# Patient Record
Sex: MALE | Race: WHITE | NOT HISPANIC OR LATINO | Employment: FULL TIME | URBAN - METROPOLITAN AREA
[De-identification: names, ages, dates, MRNs, and addresses within clinical notes are randomized per-mention and may not be internally consistent; named-entity substitution may affect disease eponyms.]

---

## 2017-03-20 ENCOUNTER — OFFICE VISIT (OUTPATIENT)
Dept: NEUROLOGY | Facility: CLINIC | Age: 57
End: 2017-03-20
Payer: COMMERCIAL

## 2017-03-20 VITALS
BODY MASS INDEX: 26.26 KG/M2 | HEART RATE: 69 BPM | DIASTOLIC BLOOD PRESSURE: 68 MMHG | SYSTOLIC BLOOD PRESSURE: 93 MMHG | WEIGHT: 163.38 LBS | HEIGHT: 66 IN

## 2017-03-20 DIAGNOSIS — G35 MS (MULTIPLE SCLEROSIS): Primary | ICD-10-CM

## 2017-03-20 PROCEDURE — 99999 PR PBB SHADOW E&M-EST. PATIENT-LVL III: CPT | Mod: PBBFAC,,, | Performed by: NEUROMUSCULOSKELETAL MEDICINE & OMM

## 2017-03-20 PROCEDURE — 1160F RVW MEDS BY RX/DR IN RCRD: CPT | Mod: S$GLB,,, | Performed by: NEUROMUSCULOSKELETAL MEDICINE & OMM

## 2017-03-20 PROCEDURE — 99215 OFFICE O/P EST HI 40 MIN: CPT | Mod: S$GLB,,, | Performed by: NEUROMUSCULOSKELETAL MEDICINE & OMM

## 2017-03-21 NOTE — PROGRESS NOTES
This history is dictated on [employer] word recognition program. There are word recognition mistakes that are occasionally missed on review.  Present History: Patient presents for follow-up for his multiple sclerosis after almost 2 years.  He is concerned over the fact that he has had increasing problems with balance, double vision and generally feeling more fatigued suggesting exacerbation over the last month or 2.  He has been off immunomodulatory therapy for 10 years.  He took Avonex for 7 years in the past.  His last MRI scan was in May 2012 which showed multiple brain and cervical lesions.  He has concerned over progression now and wishes to start therapy if it is not too late.  He is presently on Social Security disability and not able to work particularly due to the balance problems.   Previous note: 6-29-15: This a 54-year-old white male presents for follow-up for his multiple sclerosis. Patient has not been seen for several years. His last MRI was in May 2012 showing multiple white matter lesions. Patient overall is stable clinically with persistent difficulty walking. He occasionally has some cognitive problems that he notices. His predominant complaint now is intolerance to the summer heat. He has increased problems with walking and simple tasks such as mowing the yard he has to hold onto the lawnmower for support  See old notes from previous practice below:       Neurological Exam:  Mental status-alert and oriented to person, place, and time; attention span and concentration is good. Fund of knowledge-patient is aware of current events and able to give detailed history of the current problem.recent and remote memory seems intact. Language function is normal with no evidence of aphasia     Cranial nerves:Visual acuity to hand chart -normal; visual fields to confrontation normal;pupils were equal and reactive to light ; funduscopic examination was normal with sharp disc margins. 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  On right lateral gaze he has a few beats of nystagmus in the right eye with the left eye not coming over medially.  On left lateral gaze the right eye does not come pass midline but there is no nystagmus in the left eye.. Facial sensation to pinprick and corneal reflexes intact; Facial muscles were symmetrical. Hearing is unimpaired symmetrical finger rub; Tongue and palate movements were normal with swallowing unimpaired. Shoulder shrug was intact with good strength Speech was normal     Motor examination: Upper -normal; Lower extremities - right leg proximal weakness 3/5;;muscle tone was normal ; right-handed  Sensory examination:Upper and lower extremities - Pinprick and soft touch were normal and symmetrical.   Vibration sense less than 10 seconds at toes  Deep tendon reflexes hyperreflexia with the right ankle clonus;. Both plantar responses were flexor  Cerebellar examination upper: Normal finger to nose and rapid alternating movements  Gait: Unsteady gait  Romberg test: Positive     Tandem gait: Poor  Involuntary movements: Mild postural tremor; endpoint cerebellar tremor in the right hand  Cervical examination: Full range of motion with no pain Cervical tenderness:negative  Lumbar examination: Low back tenderness-negative Sciatic notch tenderness-negative Straight leg raising test-negative     Impression: Multiple sclerosis with suggestion of recent exacerbation  Recommendations/plan:  Long discussion with the patient in terms of need for medication.  He has definite progression of symptoms as well as clinical exam shows some worsening.  Need MRI is to establish radiologic progression.  We'll order MRI brain and cervical with and without contrast; we'll also order blood work CBC, liver function, QuantiFERON in anticipation of starting new immunomodulatory therapy.

## 2017-03-22 ENCOUNTER — TELEPHONE (OUTPATIENT)
Dept: NEUROLOGY | Facility: CLINIC | Age: 57
End: 2017-03-22

## 2017-03-22 NOTE — TELEPHONE ENCOUNTER
----- Message from Audelia Iqbal sent at 3/22/2017  8:51 AM CDT -----  Contact: Pt  Magdalene,    Pt states you were to contact him on yesterday (he wouldn't tell me what it is about) he didn't hear from you so he is checking in to make sure you dont forget about him    Pt contact number [phone]  Thanks

## 2017-03-22 NOTE — TELEPHONE ENCOUNTER
I have called and spoken to this patient and told him the date and time for his appointments on next Friday.

## 2017-03-31 ENCOUNTER — HOSPITAL ENCOUNTER (OUTPATIENT)
Dept: RADIOLOGY | Facility: HOSPITAL | Age: 57
Discharge: HOME | End: 2017-03-31
Attending: NEUROMUSCULOSKELETAL MEDICINE & OMM
Payer: COMMERCIAL

## 2017-03-31 DIAGNOSIS — G35 MS (MULTIPLE SCLEROSIS): ICD-10-CM

## 2017-03-31 PROCEDURE — 70553 MRI BRAIN STEM W/O & W/DYE: CPT | Mod: 26,,, | Performed by: RADIOLOGY

## 2017-03-31 PROCEDURE — 72156 MRI NECK SPINE W/O & W/DYE: CPT | Mod: 26,,, | Performed by: RADIOLOGY

## 2017-03-31 PROCEDURE — 25500020 PHARM REV CODE 255: Performed by: NEUROMUSCULOSKELETAL MEDICINE & OMM

## 2017-03-31 PROCEDURE — 72156 MRI NECK SPINE W/O & W/DYE: CPT | Mod: TC

## 2017-03-31 PROCEDURE — A9585 GADOBUTROL INJECTION: HCPCS | Performed by: NEUROMUSCULOSKELETAL MEDICINE & OMM

## 2017-03-31 PROCEDURE — 70553 MRI BRAIN STEM W/O & W/DYE: CPT | Mod: TC

## 2017-03-31 RX ORDER — GADOBUTROL 604.72 MG/ML
8 INJECTION INTRAVENOUS
Status: COMPLETED | OUTPATIENT
Start: 2017-03-31 | End: 2017-03-31

## 2017-03-31 RX ADMIN — GADOBUTROL 8 ML: 604.72 INJECTION INTRAVENOUS at 10:03

## 2017-04-24 ENCOUNTER — LAB VISIT (OUTPATIENT)
Dept: LAB | Facility: HOSPITAL | Age: 57
End: 2017-04-24
Attending: NEUROMUSCULOSKELETAL MEDICINE & OMM
Payer: COMMERCIAL

## 2017-04-24 DIAGNOSIS — G35 MS (MULTIPLE SCLEROSIS): ICD-10-CM

## 2017-04-24 PROCEDURE — 86480 TB TEST CELL IMMUN MEASURE: CPT

## 2017-04-24 PROCEDURE — 36415 COLL VENOUS BLD VENIPUNCTURE: CPT | Mod: PO

## 2017-04-26 LAB
MITOGEN NIL: 9.52 IU/ML
NIL: 0.03 IU/ML
TB ANTIGEN NIL: 0.03 IU/ML
TB ANTIGEN: 0.06 IU/ML
TB GOLD: NEGATIVE

## 2018-02-23 ENCOUNTER — TELEPHONE (OUTPATIENT)
Dept: TRANSPLANT | Facility: CLINIC | Age: 58
End: 2018-02-23

## 2018-02-23 NOTE — TELEPHONE ENCOUNTER
Hernandez Charlie Hernandez called and stated that he is interested in becoming a living donor for his friend, Burke Diez.  Medical and social history obtained.  Patient reports having MS, was treated with Interferon from [id number]. Informed that he does not meet criteria for donation. Patient verbalized understanding.

## 2018-04-11 RX ORDER — AMITRIPTYLINE HYDROCHLORIDE 25 MG/1
TABLET, FILM COATED ORAL
Qty: 30 TABLET | Refills: 5 | Status: SHIPPED | OUTPATIENT
Start: 2018-04-11 | End: 2018-10-09 | Stop reason: SDUPTHER

## 2018-04-11 NOTE — TELEPHONE ENCOUNTER
----- Message from Marci Chi sent at 4/11/2018  9:59 AM CDT -----  Contact: pt @ [phone]  Calling for refill on medication: amitriptyline (ELAVIL) 25 MG tablet    [id number] - [place] - [address]  [address] [place] [address]  Phone: [phone] Fax: [phone]

## 2018-09-27 ENCOUNTER — TELEPHONE (OUTPATIENT)
Dept: NEUROLOGY | Facility: CLINIC | Age: 58
End: 2018-09-27

## 2018-09-27 NOTE — TELEPHONE ENCOUNTER
----- Message from Marci Chi sent at 9/27/2018 12:34 PM CDT -----  Rx Refill/Request     Is this a Refill or New Rx:refill    Rx Name and Strength:  amitriptyline (ELAVIL) 25 MG tablet  Preferred Pharmacy with phone number:     [facility] [place] - [address]  [address] [place] [address]  Phone: [phone] Fax: [phone]    Communication Preference:pt @ [phone]  Additional Information: please note the change in pharmacy.

## 2018-09-27 NOTE — TELEPHONE ENCOUNTER
Patient became hostile stating he has never had to go through this issue in the past as the nurse would just refill his RX's.I explained to the patient legally he needed to f/u at 3,6 or 12 months as requested by the provider to receive continuous prescription medications.  I attempted to explain to the patient that he can receive a 30 day supply and would have to schedule an appointment for any future refills and he became combative, I attempted to advise him that he did not have to schedule an appt, I would forward message to covering provider and before I  Could complete my statement he hung up.

## 2018-10-09 RX ORDER — AMITRIPTYLINE HYDROCHLORIDE 25 MG/1
TABLET, FILM COATED ORAL
Qty: 30 TABLET | Refills: 5 | Status: SHIPPED | OUTPATIENT
Start: 2018-10-09 | End: 2019-04-09 | Stop reason: SDUPTHER

## 2018-10-17 ENCOUNTER — OFFICE VISIT (OUTPATIENT)
Dept: NEUROLOGY | Facility: CLINIC | Age: 58
End: 2018-10-17
Payer: MEDICARE

## 2018-10-17 VITALS
WEIGHT: 180.13 LBS | DIASTOLIC BLOOD PRESSURE: 75 MMHG | SYSTOLIC BLOOD PRESSURE: 120 MMHG | BODY MASS INDEX: 28.95 KG/M2 | HEART RATE: 73 BPM | HEIGHT: 66 IN

## 2018-10-17 DIAGNOSIS — R26.9 GAIT DISORDER: ICD-10-CM

## 2018-10-17 DIAGNOSIS — G35 MULTIPLE SCLEROSIS: Primary | ICD-10-CM

## 2018-10-17 PROCEDURE — 99999 PR PBB SHADOW E&M-EST. PATIENT-LVL III: CPT | Mod: PBBFAC,,, | Performed by: NEUROMUSCULOSKELETAL MEDICINE & OMM

## 2018-10-17 PROCEDURE — 99213 OFFICE O/P EST LOW 20 MIN: CPT | Mod: PBBFAC,PO | Performed by: NEUROMUSCULOSKELETAL MEDICINE & OMM

## 2018-10-17 PROCEDURE — 99215 OFFICE O/P EST HI 40 MIN: CPT | Mod: S$PBB,,, | Performed by: NEUROMUSCULOSKELETAL MEDICINE & OMM

## 2018-10-17 RX ORDER — OMEGA-3 FATTY ACIDS/FISH OIL 360-1200MG
CAPSULE ORAL
COMMUNITY
End: 2022-06-07

## 2018-10-17 RX ORDER — ERGOCALCIFEROL 1.25 MG/1
CAPSULE ORAL
Refills: 3 | COMMUNITY
Start: 2018-08-09 | End: 2021-12-07

## 2018-10-19 NOTE — PROGRESS NOTES
Progress Notes        This history is dictated on [employer] word recognition program. There are word recognition mistakes that are occasionally missed on review.  Present History:  patient presents today for follow-up for his multiple sclerosis.  He continues to limp somewhat on the right leg however overall feels good.  Jitendra baires in stays quite active.  His last visit on 3-20-17 showed MRI brain and cervical spine were stable.  He still not interested in any immunomodulatory medication.  He does have trouble sleeping at night sometimes and has been on amitriptyline 25 mg HS.  We discussed am increasing the dosage as needed.  Previous note:  3-20-17:  Patient presents for follow-up for his multiple sclerosis after almost 2 years.  He is concerned over the fact that he has had increasing problems with balance, double vision   and generally feeling more fatigued suggesting exacerbation over the last month or 2.  He has been off immunomodulatory therapy for 10 years.  He took Avonex for 7 years in the past.  His last MRI scan was in May 2012 which showed multiple brain and cervical lesions.  He has concerned over progression now and wishes to start therapy if it is not too late.  He is presently on Social Security disability and not able to work particularly due to the balance problems.   Previous note: 6-29-15: This a 54-year-old white male presents for follow-up for his multiple sclerosis. Patient has not been seen for several years. His last MRI was in May 2012 showing multiple white matter lesions. Patient overall is stable clinically with persistent difficulty walking. He occasionally has some cognitive problems that he notices. His predominant complaint now is intolerance to the summer heat. He has increased problems with walking and simple tasks such as mowing the yard he has to hold onto the lawnmower for support  See old notes from previous practice below:       Neurological Exam:  Mental  status-alert and oriented to person, place, and time; attention span and concentration is good. Fund of knowledge-patient is aware of current events and able to give detailed history of the current problem.recent and remote memory seems intact. Language function is normal with no evidence of aphasia     Cranial nerves:Visual acuity to hand chart -normal; visual fields to confrontation normal;pupils were equal and reactive to light ; funduscopic examination was normal with sharp disc margins. 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  On right lateral gaze he has a few beats of nystagmus in the right eye with the left eye not coming over medially.  On left lateral gaze the right eye does not come pass midline but there is no nystagmus in the left eye.. Facial sensation to pinprick and corneal reflexes intact; Facial muscles were symmetrical. Hearing is unimpaired symmetrical finger rub; Tongue and palate movements were normal with swallowing unimpaired. Shoulder shrug was intact with good strength Speech was normal     Motor examination: Upper -normal; Lower extremities - right leg proximal weakness 3/5;;muscle tone was normal ; right-handed  Sensory examination:Upper and lower extremities - Pinprick and soft touch were normal and symmetrical.   Vibration sense less than 10 seconds at toes  Deep tendon reflexes hyperreflexia with the right ankle clonus;. Both plantar responses were flexor  Cerebellar examination upper: Normal finger to nose and rapid alternating movements  Gait: Unsteady gait; limps on right leg  Romberg test: Positive     Tandem gait: Poor  Involuntary movements: Mild postural tremor; endpoint cerebellar tremor in the right hand  Cervical examination: Full range of motion with no pain Cervical tenderness:negative  Lumbar examination: Low back tenderness-negative Sciatic notch tenderness-negative Straight leg raising test-negative     Impression: Multiple  sclerosis with suggestion of recent exacerbation  Recommendations/plan:  Again Long discussion with the patient in terms of immunologic medication.   since he is stable now he wishes to hold off again..   continue amitriptyline 25 mg at night for sleep with an option to take 50 on occasion.; follow-up 1 year

## 2019-04-09 RX ORDER — AMITRIPTYLINE HYDROCHLORIDE 25 MG/1
TABLET, FILM COATED ORAL
Qty: 30 TABLET | Refills: 5 | OUTPATIENT
Start: 2019-04-09

## 2019-04-09 RX ORDER — AMITRIPTYLINE HYDROCHLORIDE 25 MG/1
TABLET, FILM COATED ORAL
Qty: 30 TABLET | Refills: 5 | Status: SHIPPED | OUTPATIENT
Start: 2019-04-09 | End: 2019-10-17 | Stop reason: SDUPTHER

## 2019-06-25 ENCOUNTER — TELEPHONE (OUTPATIENT)
Dept: NEUROLOGY | Facility: CLINIC | Age: 59
End: 2019-06-25

## 2019-06-25 NOTE — TELEPHONE ENCOUNTER
----- Message from Stanley Patterson sent at 6/25/2019  2:29 PM CDT -----  Contact: Patient @ [phone]  Patient calling to schedule the Recall visit, pls call

## 2019-10-16 RX ORDER — AMITRIPTYLINE HYDROCHLORIDE 25 MG/1
TABLET, FILM COATED ORAL
Qty: 30 TABLET | Refills: 0 | OUTPATIENT
Start: 2019-10-16

## 2019-10-17 RX ORDER — AMITRIPTYLINE HYDROCHLORIDE 25 MG/1
TABLET, FILM COATED ORAL
Qty: 30 TABLET | Refills: 0 | Status: SHIPPED | OUTPATIENT
Start: 2019-10-17 | End: 2019-11-18 | Stop reason: SDUPTHER

## 2019-10-17 NOTE — TELEPHONE ENCOUNTER
----- Message from Janeth Chi sent at 10/17/2019  9:18 AM CDT -----  Contact: Self [phone]  .Refill request.  amitriptyline (ELAVIL) 25 MG tablet    ..  [facility] - [place] - [address]  [address] [place] [address]  Phone: [phone] Fax: [phone]

## 2019-11-05 ENCOUNTER — OFFICE VISIT (OUTPATIENT)
Dept: NEUROLOGY | Facility: CLINIC | Age: 59
End: 2019-11-05
Payer: MEDICARE

## 2019-11-05 VITALS
BODY MASS INDEX: 29.72 KG/M2 | DIASTOLIC BLOOD PRESSURE: 98 MMHG | HEIGHT: 66 IN | WEIGHT: 184.94 LBS | HEART RATE: 95 BPM | SYSTOLIC BLOOD PRESSURE: 144 MMHG

## 2019-11-05 DIAGNOSIS — R29.898 RIGHT LEG WEAKNESS: ICD-10-CM

## 2019-11-05 DIAGNOSIS — G35 MULTIPLE SCLEROSIS: Primary | ICD-10-CM

## 2019-11-05 DIAGNOSIS — G25.2 POSTURAL TREMOR: ICD-10-CM

## 2019-11-05 PROCEDURE — 99213 OFFICE O/P EST LOW 20 MIN: CPT | Mod: PBBFAC,PO | Performed by: NEUROMUSCULOSKELETAL MEDICINE & OMM

## 2019-11-05 PROCEDURE — 99999 PR PBB SHADOW E&M-EST. PATIENT-LVL III: ICD-10-PCS | Mod: PBBFAC,,, | Performed by: NEUROMUSCULOSKELETAL MEDICINE & OMM

## 2019-11-05 PROCEDURE — 99214 PR OFFICE/OUTPT VISIT, EST, LEVL IV, 30-39 MIN: ICD-10-PCS | Mod: S$GLB,,, | Performed by: NEUROMUSCULOSKELETAL MEDICINE & OMM

## 2019-11-05 PROCEDURE — 99999 PR PBB SHADOW E&M-EST. PATIENT-LVL III: CPT | Mod: PBBFAC,,, | Performed by: NEUROMUSCULOSKELETAL MEDICINE & OMM

## 2019-11-05 PROCEDURE — 99214 OFFICE O/P EST MOD 30 MIN: CPT | Mod: S$GLB,,, | Performed by: NEUROMUSCULOSKELETAL MEDICINE & OMM

## 2019-11-05 RX ORDER — METHYLPREDNISOLONE 4 MG/1
TABLET ORAL
COMMUNITY
Start: 2019-04-27 | End: 2021-12-07 | Stop reason: ALTCHOICE

## 2019-11-06 PROBLEM — R29.898 RIGHT LEG WEAKNESS: Status: ACTIVE | Noted: 2019-11-06

## 2019-11-08 ENCOUNTER — HOSPITAL ENCOUNTER (OUTPATIENT)
Dept: RADIOLOGY | Facility: HOSPITAL | Age: 59
Discharge: HOME | End: 2019-11-08
Attending: NEUROMUSCULOSKELETAL MEDICINE & OMM
Payer: MEDICARE

## 2019-11-08 DIAGNOSIS — G35 MULTIPLE SCLEROSIS: ICD-10-CM

## 2019-11-08 PROCEDURE — 70553 MRI BRAIN W WO CONTRAST: ICD-10-PCS | Mod: 26,,, | Performed by: RADIOLOGY

## 2019-11-08 PROCEDURE — A9585 GADOBUTROL INJECTION: HCPCS | Performed by: NEUROMUSCULOSKELETAL MEDICINE & OMM

## 2019-11-08 PROCEDURE — 70553 MRI BRAIN STEM W/O & W/DYE: CPT | Mod: 26,,, | Performed by: RADIOLOGY

## 2019-11-08 PROCEDURE — 72156 MRI NECK SPINE W/O & W/DYE: CPT | Mod: 26,,, | Performed by: RADIOLOGY

## 2019-11-08 PROCEDURE — 25500020 PHARM REV CODE 255: Performed by: NEUROMUSCULOSKELETAL MEDICINE & OMM

## 2019-11-08 PROCEDURE — 70553 MRI BRAIN STEM W/O & W/DYE: CPT | Mod: TC

## 2019-11-08 PROCEDURE — 72156 MRI CERVICAL SPINE W WO CONTRAST: ICD-10-PCS | Mod: 26,,, | Performed by: RADIOLOGY

## 2019-11-08 PROCEDURE — 72156 MRI NECK SPINE W/O & W/DYE: CPT | Mod: TC

## 2019-11-08 RX ORDER — GADOBUTROL 604.72 MG/ML
9 INJECTION INTRAVENOUS
Status: COMPLETED | OUTPATIENT
Start: 2019-11-08 | End: 2019-11-08

## 2019-11-08 RX ADMIN — GADOBUTROL 9 ML: 604.72 INJECTION INTRAVENOUS at 05:11

## 2019-11-12 ENCOUNTER — PATIENT MESSAGE (OUTPATIENT)
Dept: NEUROLOGY | Facility: CLINIC | Age: 59
End: 2019-11-12

## 2019-11-18 RX ORDER — AMITRIPTYLINE HYDROCHLORIDE 25 MG/1
TABLET, FILM COATED ORAL
Qty: 30 TABLET | Refills: 0 | OUTPATIENT
Start: 2019-11-18

## 2019-11-18 RX ORDER — AMITRIPTYLINE HYDROCHLORIDE 25 MG/1
TABLET, FILM COATED ORAL
Qty: 30 TABLET | Refills: 0 | Status: SHIPPED | OUTPATIENT
Start: 2019-11-18 | End: 2019-12-26 | Stop reason: SDUPTHER

## 2019-12-17 ENCOUNTER — TELEPHONE (OUTPATIENT)
Dept: NEUROLOGY | Facility: CLINIC | Age: 59
End: 2019-12-17

## 2019-12-17 NOTE — TELEPHONE ENCOUNTER
----- Message from Andria Levine sent at 12/17/2019  3:55 PM CST -----  Contact: pt at [phone]  Ahsan pt-Pt  Received a letter that heis due to come in.  Had an MRI last month and no one ever called him with the results so pt looked it up himself.  Does he really need to come in again?

## 2019-12-24 RX ORDER — AMITRIPTYLINE HYDROCHLORIDE 25 MG/1
TABLET, FILM COATED ORAL
Qty: 30 TABLET | Refills: 0 | OUTPATIENT
Start: 2019-12-24

## 2019-12-26 RX ORDER — AMITRIPTYLINE HYDROCHLORIDE 25 MG/1
TABLET, FILM COATED ORAL
Qty: 30 TABLET | Refills: 1 | Status: SHIPPED | OUTPATIENT
Start: 2019-12-26 | End: 2020-03-30 | Stop reason: SDUPTHER

## 2020-03-30 RX ORDER — AMITRIPTYLINE HYDROCHLORIDE 25 MG/1
TABLET, FILM COATED ORAL
Qty: 30 TABLET | Refills: 0 | Status: SHIPPED | OUTPATIENT
Start: 2020-03-30 | End: 2020-04-28

## 2020-04-28 RX ORDER — AMITRIPTYLINE HYDROCHLORIDE 25 MG/1
TABLET, FILM COATED ORAL
Qty: 30 TABLET | Refills: 0 | Status: SHIPPED | OUTPATIENT
Start: 2020-04-28 | End: 2020-06-02

## 2020-08-26 DIAGNOSIS — Z86.69 HX OF MIGRAINE HEADACHES: Primary | ICD-10-CM

## 2020-08-26 RX ORDER — AMITRIPTYLINE HYDROCHLORIDE 25 MG/1
25 TABLET, FILM COATED ORAL NIGHTLY
Qty: 30 TABLET | Refills: 0 | Status: SHIPPED | OUTPATIENT
Start: 2020-08-26 | End: 2020-09-22

## 2020-09-02 ENCOUNTER — OFFICE VISIT (OUTPATIENT)
Dept: NEUROLOGY | Facility: CLINIC | Age: 60
End: 2020-09-02
Payer: MEDICARE

## 2020-09-02 VITALS
DIASTOLIC BLOOD PRESSURE: 95 MMHG | SYSTOLIC BLOOD PRESSURE: 133 MMHG | BODY MASS INDEX: 29.57 KG/M2 | HEIGHT: 66 IN | WEIGHT: 184 LBS | HEART RATE: 94 BPM

## 2020-09-02 DIAGNOSIS — R26.9 GAIT DISORDER: Primary | ICD-10-CM

## 2020-09-02 DIAGNOSIS — G35 MULTIPLE SCLEROSIS: ICD-10-CM

## 2020-09-02 PROCEDURE — 3008F PR BODY MASS INDEX (BMI) DOCUMENTED: ICD-10-PCS | Mod: CPTII,S$GLB,, | Performed by: NEUROMUSCULOSKELETAL MEDICINE & OMM

## 2020-09-02 PROCEDURE — 99214 PR OFFICE/OUTPT VISIT, EST, LEVL IV, 30-39 MIN: ICD-10-PCS | Mod: S$GLB,,, | Performed by: NEUROMUSCULOSKELETAL MEDICINE & OMM

## 2020-09-02 PROCEDURE — 99999 PR PBB SHADOW E&M-EST. PATIENT-LVL III: ICD-10-PCS | Mod: PBBFAC,,, | Performed by: NEUROMUSCULOSKELETAL MEDICINE & OMM

## 2020-09-02 PROCEDURE — 3008F BODY MASS INDEX DOCD: CPT | Mod: CPTII,S$GLB,, | Performed by: NEUROMUSCULOSKELETAL MEDICINE & OMM

## 2020-09-02 PROCEDURE — 99999 PR PBB SHADOW E&M-EST. PATIENT-LVL III: CPT | Mod: PBBFAC,,, | Performed by: NEUROMUSCULOSKELETAL MEDICINE & OMM

## 2020-09-02 PROCEDURE — 99214 OFFICE O/P EST MOD 30 MIN: CPT | Mod: S$GLB,,, | Performed by: NEUROMUSCULOSKELETAL MEDICINE & OMM

## 2020-09-02 NOTE — PROGRESS NOTES
Progress Notes  Ritesh Foreman MD (Physician)   Neurology   11/5/2019  3:40 PM   Signed        This history is dictated on [employer] word recognition program. There are word recognition mistakes that are occasionally missed on review.  Present History:   PATIENT PRESENTS FOR FOLLOW-UP FOR HIS MULTIPLE SCLEROSIS.  EVERYTHING IS STABLE WITH NO CLINICAL CHANGES ON NO IMMUNOMODULATOR MEDICATIONS.  PATIENT HAS OCCASIONAL FALLS WITH HIS BALANCE BEING OFF BUT OTHERWISE NO FOCAL NEUROLOGIC CHANGES.  HIS LAST MRI IN NOVEMBER 2019 WAS STABLE WITH NO CHANGE.      PREVIOUS NOTE 11-5-19:  Patient presents for follow-up for his multiple sclerosis.  Is presently on amitriptyline 25 mg at night but no immunomodulatory treatment.  He complains of persistent right leg weakness with aggravation by fatigue. He continues to have some weakness on walking particularly with dorsiflexion and plantar flexion of the right foot.  He continues to have tremor of the right hand.  His last MRI was in 3-3 1-17 with a multiple brain and cervical lesions.  Patient inquires as to whether he should go back on immunomodulator therapy.  We will make this decision based on MRI scan      Previous note:  10-17-18:   patient presents today for follow-up for his multiple sclerosis.  He continues to limp somewhat on the right leg however overall feels good.  Jitendra baires in stays quite active.  His last visit on 3-20-17 showed MRI brain and cervical spine were stable.  He still not interested in any immunomodulatory medication.  He does have trouble sleeping at night sometimes and has been on amitriptyline 25 mg HS.  We discussed am increasing the dosage as needed.       See old notes from previous practice below:       Neurological Exam:  Mental status-alert and oriented to person, place, and time; attention span and concentration is good. Fund of knowledge-patient is aware of current events and able to give detailed history of the  current problem.recent and remote memory seems intact. Language function is normal with no evidence of aphasia     Cranial nerves:Visual acuity to hand chart -normal; visual fields to confrontation normal;pupils were equal and reactive to light ; funduscopic examination was normal with sharp disc margins. external ocular movements show evidence for bilateral internuclear ophthalmoplegia.  On right lateral gaze he has a few beats of nystagmus in the right eye with the left eye not coming over medially.  On left lateral gaze the right eye does not come pass midline but there is no nystagmus in the left eye.. Facial sensation to pinprick and corneal reflexes intact; Facial muscles were symmetrical. Hearing is unimpaired symmetrical finger rub; Tongue and palate movements were normal with swallowing unimpaired. Shoulder shrug was intact with good strength Speech was normal     Motor examination: Upper -normal; Lower extremities - right leg proximal-good strength; dorsiflexors plantar flexor weakness if under 3/5;;muscle tone was normal ; right-handed  Sensory examination:Upper and lower extremities - Pinprick and soft touch were normal and symmetrical.   Vibration sense less than 10 seconds at toes  Deep tendon reflexes hyperreflexia with the right ankle clonus;. Both plantar responses were flexor  Cerebellar examination upper: Normal finger to nose and rapid alternating movements  Gait: Unsteady gait; limps on right leg  Romberg test: Positive     Tandem gait: Poor  Involuntary movements: Mild postural tremor; endpoint cerebellar tremor in the right hand  Cervical examination: Full range of motion with no pain Cervical tenderness:negative  Lumbar examination: Low back tenderness-negative Sciatic notch tenderness-negative Straight leg raising test-negative     Impression: Multiple sclerosis ; right leg weakness; suggestion of AB in the past; postural tremor  Recommendations/plan A:  Again Long discussion with the  patient in terms of immunologic medication; discussed options in terms of MRI findings on ordered scans..  FOLLOW-UP IN 6 MONTHS

## 2021-02-23 ENCOUNTER — TELEPHONE (OUTPATIENT)
Dept: NEUROLOGY | Facility: CLINIC | Age: 61
End: 2021-02-23

## 2021-03-02 ENCOUNTER — TELEPHONE (OUTPATIENT)
Dept: NEUROLOGY | Facility: CLINIC | Age: 61
End: 2021-03-02

## 2021-03-02 DIAGNOSIS — Z86.69 HX OF MIGRAINE HEADACHES: ICD-10-CM

## 2021-03-03 RX ORDER — AMITRIPTYLINE HYDROCHLORIDE 25 MG/1
TABLET, FILM COATED ORAL
Qty: 30 TABLET | Refills: 0 | Status: SHIPPED | OUTPATIENT
Start: 2021-03-03 | End: 2021-03-25

## 2021-04-17 DIAGNOSIS — Z86.69 HX OF MIGRAINE HEADACHES: ICD-10-CM

## 2021-04-22 RX ORDER — AMITRIPTYLINE HYDROCHLORIDE 25 MG/1
TABLET, FILM COATED ORAL
Qty: 30 TABLET | Refills: 0 | Status: SHIPPED | OUTPATIENT
Start: 2021-04-22 | End: 2021-05-19

## 2021-06-11 ENCOUNTER — NURSE TRIAGE (OUTPATIENT)
Dept: ADMINISTRATIVE | Facility: CLINIC | Age: 61
End: 2021-06-11

## 2021-06-11 ENCOUNTER — PATIENT MESSAGE (OUTPATIENT)
Dept: NEUROLOGY | Facility: CLINIC | Age: 61
End: 2021-06-11

## 2021-09-27 ENCOUNTER — TELEPHONE (OUTPATIENT)
Dept: NEUROLOGY | Facility: CLINIC | Age: 61
End: 2021-09-27

## 2021-09-30 ENCOUNTER — TELEPHONE (OUTPATIENT)
Dept: NEUROLOGY | Facility: CLINIC | Age: 61
End: 2021-09-30

## 2021-12-07 ENCOUNTER — LAB VISIT (OUTPATIENT)
Dept: LAB | Facility: HOSPITAL | Age: 61
End: 2021-12-07
Attending: FAMILY MEDICINE
Payer: MEDICARE

## 2021-12-07 DIAGNOSIS — D64.9 ANEMIA, UNSPECIFIED TYPE: ICD-10-CM

## 2021-12-07 DIAGNOSIS — R53.83 FATIGUE, UNSPECIFIED TYPE: ICD-10-CM

## 2021-12-07 DIAGNOSIS — Z13.6 ENCOUNTER FOR SCREENING FOR CARDIOVASCULAR DISORDERS: ICD-10-CM

## 2021-12-07 DIAGNOSIS — E55.9 VITAMIN D DEFICIENCY: ICD-10-CM

## 2021-12-07 LAB
25(OH)D3+25(OH)D2 SERPL-MCNC: 52 NG/ML (ref 30–96)
ALBUMIN SERPL BCP-MCNC: 4.3 G/DL (ref 3.5–5.2)
ALP SERPL-CCNC: 69 U/L (ref 55–135)
ALT SERPL W/O P-5'-P-CCNC: 42 U/L (ref 10–44)
ANION GAP SERPL CALC-SCNC: 8 MMOL/L (ref 8–16)
AST SERPL-CCNC: 24 U/L (ref 10–40)
BASOPHILS # BLD AUTO: 0.06 K/UL (ref 0–0.2)
BASOPHILS NFR BLD: 0.8 % (ref 0–1.9)
BILIRUB SERPL-MCNC: 0.6 MG/DL (ref 0.1–1)
BUN SERPL-MCNC: 9 MG/DL (ref 6–20)
CALCIUM SERPL-MCNC: 9.6 MG/DL (ref 8.7–10.5)
CHLORIDE SERPL-SCNC: 104 MMOL/L (ref 95–110)
CHOLEST SERPL-MCNC: 242 MG/DL (ref 120–199)
CHOLEST/HDLC SERPL: 5.4 {RATIO} (ref 2–5)
CO2 SERPL-SCNC: 28 MMOL/L (ref 23–29)
CREAT SERPL-MCNC: 0.9 MG/DL (ref 0.5–1.4)
DIFFERENTIAL METHOD: ABNORMAL
EOSINOPHIL # BLD AUTO: 0.2 K/UL (ref 0–0.5)
EOSINOPHIL NFR BLD: 2.4 % (ref 0–8)
ERYTHROCYTE [DISTWIDTH] IN BLOOD BY AUTOMATED COUNT: 12.1 % (ref 11.5–14.5)
EST. GFR  (AFRICAN AMERICAN): >60 ML/MIN/1.73 M^2
EST. GFR  (NON AFRICAN AMERICAN): >60 ML/MIN/1.73 M^2
FERRITIN SERPL-MCNC: 62 NG/ML (ref 20–300)
FOLATE SERPL-MCNC: 19.5 NG/ML (ref 4–24)
GLUCOSE SERPL-MCNC: 108 MG/DL (ref 70–110)
HCT VFR BLD AUTO: 47 % (ref 40–54)
HDLC SERPL-MCNC: 45 MG/DL (ref 40–75)
HDLC SERPL: 18.6 % (ref 20–50)
HGB BLD-MCNC: 15.7 G/DL (ref 14–18)
IMM GRANULOCYTES # BLD AUTO: 0.05 K/UL (ref 0–0.04)
IMM GRANULOCYTES NFR BLD AUTO: 0.7 % (ref 0–0.5)
IRON SERPL-MCNC: 140 UG/DL (ref 45–160)
LDLC SERPL CALC-MCNC: 162.8 MG/DL (ref 63–159)
LYMPHOCYTES # BLD AUTO: 2.4 K/UL (ref 1–4.8)
LYMPHOCYTES NFR BLD: 31.1 % (ref 18–48)
MCH RBC QN AUTO: 30.3 PG (ref 27–31)
MCHC RBC AUTO-ENTMCNC: 33.4 G/DL (ref 32–36)
MCV RBC AUTO: 91 FL (ref 82–98)
MONOCYTES # BLD AUTO: 0.7 K/UL (ref 0.3–1)
MONOCYTES NFR BLD: 9.2 % (ref 4–15)
NEUTROPHILS # BLD AUTO: 4.2 K/UL (ref 1.8–7.7)
NEUTROPHILS NFR BLD: 55.8 % (ref 38–73)
NONHDLC SERPL-MCNC: 197 MG/DL
NRBC BLD-RTO: 0 /100 WBC
PLATELET # BLD AUTO: 262 K/UL (ref 150–450)
PMV BLD AUTO: 8.8 FL (ref 9.2–12.9)
POTASSIUM SERPL-SCNC: 4.1 MMOL/L (ref 3.5–5.1)
PROT SERPL-MCNC: 7.6 G/DL (ref 6–8.4)
RBC # BLD AUTO: 5.18 M/UL (ref 4.6–6.2)
SATURATED IRON: 33 % (ref 20–50)
SODIUM SERPL-SCNC: 140 MMOL/L (ref 136–145)
TOTAL IRON BINDING CAPACITY: 426 UG/DL (ref 250–450)
TRANSFERRIN SERPL-MCNC: 288 MG/DL (ref 200–375)
TRIGL SERPL-MCNC: 171 MG/DL (ref 30–150)
TSH SERPL DL<=0.005 MIU/L-ACNC: 2.18 UIU/ML (ref 0.4–4)
VIT B12 SERPL-MCNC: 388 PG/ML (ref 210–950)
WBC # BLD AUTO: 7.58 K/UL (ref 3.9–12.7)

## 2021-12-07 PROCEDURE — 80061 LIPID PANEL: CPT | Mod: HCWC | Performed by: FAMILY MEDICINE

## 2021-12-07 PROCEDURE — 80053 COMPREHEN METABOLIC PANEL: CPT | Mod: HCWC | Performed by: FAMILY MEDICINE

## 2021-12-07 PROCEDURE — 36415 COLL VENOUS BLD VENIPUNCTURE: CPT | Mod: HCWC | Performed by: FAMILY MEDICINE

## 2021-12-07 PROCEDURE — 82306 VITAMIN D 25 HYDROXY: CPT | Mod: HCWC | Performed by: FAMILY MEDICINE

## 2021-12-07 PROCEDURE — 85025 COMPLETE CBC W/AUTO DIFF WBC: CPT | Mod: HCWC | Performed by: FAMILY MEDICINE

## 2021-12-07 PROCEDURE — 84466 ASSAY OF TRANSFERRIN: CPT | Mod: HCWC | Performed by: FAMILY MEDICINE

## 2021-12-07 PROCEDURE — 82607 VITAMIN B-12: CPT | Mod: HCWC | Performed by: FAMILY MEDICINE

## 2021-12-07 PROCEDURE — 84443 ASSAY THYROID STIM HORMONE: CPT | Mod: HCWC | Performed by: FAMILY MEDICINE

## 2021-12-07 PROCEDURE — 82746 ASSAY OF FOLIC ACID SERUM: CPT | Mod: HCWC | Performed by: FAMILY MEDICINE

## 2021-12-07 PROCEDURE — 82728 ASSAY OF FERRITIN: CPT | Mod: HCWC | Performed by: FAMILY MEDICINE

## 2021-12-21 ENCOUNTER — PATIENT MESSAGE (OUTPATIENT)
Dept: NEUROLOGY | Facility: CLINIC | Age: 61
End: 2021-12-21
Payer: MEDICARE

## 2022-02-28 ENCOUNTER — PATIENT MESSAGE (OUTPATIENT)
Dept: PSYCHIATRY | Facility: CLINIC | Age: 62
End: 2022-02-28
Payer: MEDICARE

## 2022-04-28 ENCOUNTER — TELEPHONE (OUTPATIENT)
Dept: NEUROLOGY | Facility: CLINIC | Age: 62
End: 2022-04-28
Payer: MEDICARE

## 2022-04-28 NOTE — TELEPHONE ENCOUNTER
----- Message from Dulce Casillas sent at 4/28/2022  2:01 PM CDT -----  Regarding: pt  Pt is calling to speak with the nurse pt said he will further discuss when the nurse calls him back can you please call pt at [phone].    CORINNA

## 2022-05-02 ENCOUNTER — TELEPHONE (OUTPATIENT)
Dept: NEUROLOGY | Facility: CLINIC | Age: 62
End: 2022-05-02
Payer: MEDICARE

## 2022-05-02 NOTE — TELEPHONE ENCOUNTER
----- Message from Michele Yung sent at 5/2/2022 11:55 AM CDT -----  Type:  Patient Returning Call    Who Called:Patient     Who Left Message for Patient:Jeanette Paris MA      Does the patient know what this is regarding?:yes    Would the patient rather a call back or a response via [facility]? Call back     Best Call Back Number: ([email or web address]). [phone]    Additional Information:

## 2022-06-07 ENCOUNTER — LAB VISIT (OUTPATIENT)
Dept: LAB | Facility: HOSPITAL | Age: 62
End: 2022-06-07
Attending: FAMILY MEDICINE
Payer: MEDICARE

## 2022-06-07 DIAGNOSIS — E78.00 HIGH CHOLESTEROL: ICD-10-CM

## 2022-06-07 LAB
CHOLEST SERPL-MCNC: 218 MG/DL (ref 120–199)
CHOLEST/HDLC SERPL: 4.4 {RATIO} (ref 2–5)
HDLC SERPL-MCNC: 49 MG/DL (ref 40–75)
HDLC SERPL: 22.5 % (ref 20–50)
LDLC SERPL CALC-MCNC: 137.6 MG/DL (ref 63–159)
NONHDLC SERPL-MCNC: 169 MG/DL
TRIGL SERPL-MCNC: 157 MG/DL (ref 30–150)

## 2022-06-07 PROCEDURE — 82172 ASSAY OF APOLIPOPROTEIN: CPT | Performed by: FAMILY MEDICINE

## 2022-06-07 PROCEDURE — 80061 LIPID PANEL: CPT | Performed by: FAMILY MEDICINE

## 2022-06-07 PROCEDURE — 83695 ASSAY OF LIPOPROTEIN(A): CPT | Performed by: FAMILY MEDICINE

## 2022-06-07 PROCEDURE — 36415 COLL VENOUS BLD VENIPUNCTURE: CPT | Performed by: FAMILY MEDICINE

## 2022-06-09 LAB — APO B SERPL-MCNC: 113 MG/DL

## 2022-06-10 LAB — LPA SERPL-MCNC: 10 MG/DL (ref 0–30)

## 2022-06-24 ENCOUNTER — PATIENT MESSAGE (OUTPATIENT)
Dept: PSYCHIATRY | Facility: CLINIC | Age: 62
End: 2022-06-24
Payer: MEDICARE

## 2022-07-11 ENCOUNTER — TELEPHONE (OUTPATIENT)
Dept: NEUROLOGY | Facility: CLINIC | Age: 62
End: 2022-07-11
Payer: MEDICARE

## 2022-07-11 ENCOUNTER — TELEPHONE (OUTPATIENT)
Dept: NEUROLOGY | Facility: CLINIC | Age: 62
End: 2022-07-11

## 2022-07-11 NOTE — TELEPHONE ENCOUNTER
----- Message from Tremontana Chevalier sent at 7/11/2022  9:00 AM CDT -----  Regarding: pt advice  Contact: pt @ [phone] or [phone]  Pt calling to k with someone in Dr. Foreman's office to see when next MRI should be scheduled . Pls call pt @ [phone] or [phone].

## 2022-07-11 NOTE — TELEPHONE ENCOUNTER
Patient has not been seen by Dr. Foreman since 9.2.20.  Office visit would be needed to determine if MRI is needed.  I checked Dr. Foreman's schedule, and he has no opening from now to when he retires in a few months.  I coordinated with Tierney BAKER of our [facility] clinic, and Dr. Anne is accepting new patients.  I left a voicemail with patient informing him of above and asked him to reach out to me if he is willing to see Dr. Anne, and once I have that confirmation I will have MS clinic reach out to patient to schedule an appointment.

## 2022-07-18 ENCOUNTER — TELEPHONE (OUTPATIENT)
Dept: NEUROLOGY | Facility: CLINIC | Age: 62
End: 2022-07-18
Payer: MEDICARE

## 2022-07-18 NOTE — TELEPHONE ENCOUNTER
----- Message from Barb Rose sent at 7/18/2022  8:35 AM CDT -----  Regarding: new doctor  Type:  Patient Returning Call    Who Called:patient     Who Left Message for Patient:n/a    Does the patient know what this is regarding?:patient would like to know more information on the physician information.     Would the patient rather a call back or a response via [facility]? Call back     Best Call Back Number:[phone]  Additional Information: n/a

## 2022-08-16 ENCOUNTER — TELEPHONE (OUTPATIENT)
Dept: RESEARCH | Facility: HOSPITAL | Age: 62
End: 2022-08-16
Payer: MEDICARE

## 2022-08-16 NOTE — TELEPHONE ENCOUNTER
Seeing Dr grullon [id number], old neurologist retired  Not on DMT right now, but potentially interested in research    I let Hernandez know that I will look at his chart, see if he may qualify, if he does I can send him ICF, we can touch base again before his appt with dr grullon to see if he does qualify for something - if so  I can talk to him at his appt with dr grullon.    Hernandez was thanked for his time and I gave him my direct phone number in case he has any more questions about research.

## 2022-10-06 ENCOUNTER — TELEPHONE (OUTPATIENT)
Dept: OPHTHALMOLOGY | Facility: CLINIC | Age: 62
End: 2022-10-06
Payer: MEDICARE

## 2022-10-12 ENCOUNTER — LAB VISIT (OUTPATIENT)
Dept: LAB | Facility: HOSPITAL | Age: 62
End: 2022-10-12
Attending: STUDENT IN AN ORGANIZED HEALTH CARE EDUCATION/TRAINING PROGRAM
Payer: MEDICARE

## 2022-10-12 ENCOUNTER — OFFICE VISIT (OUTPATIENT)
Dept: NEUROLOGY | Facility: CLINIC | Age: 62
End: 2022-10-12
Payer: MEDICARE

## 2022-10-12 VITALS
SYSTOLIC BLOOD PRESSURE: 105 MMHG | WEIGHT: 168.56 LBS | BODY MASS INDEX: 27.09 KG/M2 | DIASTOLIC BLOOD PRESSURE: 66 MMHG | HEIGHT: 66 IN | HEART RATE: 69 BPM

## 2022-10-12 DIAGNOSIS — G35 MULTIPLE SCLEROSIS: Chronic | ICD-10-CM

## 2022-10-12 DIAGNOSIS — R53.83 FATIGUE, UNSPECIFIED TYPE: ICD-10-CM

## 2022-10-12 DIAGNOSIS — G35 MULTIPLE SCLEROSIS: Primary | Chronic | ICD-10-CM

## 2022-10-12 DIAGNOSIS — R26.89 DRAGS LEG: ICD-10-CM

## 2022-10-12 DIAGNOSIS — R25.1 TREMOR: ICD-10-CM

## 2022-10-12 LAB
ALBUMIN SERPL BCP-MCNC: 4.5 G/DL (ref 3.5–5.2)
ALP SERPL-CCNC: 70 U/L (ref 55–135)
ALT SERPL W/O P-5'-P-CCNC: 30 U/L (ref 10–44)
ANION GAP SERPL CALC-SCNC: 8 MMOL/L (ref 8–16)
AST SERPL-CCNC: 21 U/L (ref 10–40)
BASOPHILS # BLD AUTO: 0.05 K/UL (ref 0–0.2)
BASOPHILS NFR BLD: 0.7 % (ref 0–1.9)
BILIRUB SERPL-MCNC: 0.6 MG/DL (ref 0.1–1)
BUN SERPL-MCNC: 12 MG/DL (ref 6–20)
CALCIUM SERPL-MCNC: 9.6 MG/DL (ref 8.7–10.5)
CHLORIDE SERPL-SCNC: 104 MMOL/L (ref 95–110)
CO2 SERPL-SCNC: 28 MMOL/L (ref 23–29)
CREAT SERPL-MCNC: 0.8 MG/DL (ref 0.5–1.4)
DIFFERENTIAL METHOD: ABNORMAL
EOSINOPHIL # BLD AUTO: 0.2 K/UL (ref 0–0.5)
EOSINOPHIL NFR BLD: 2.4 % (ref 0–8)
ERYTHROCYTE [DISTWIDTH] IN BLOOD BY AUTOMATED COUNT: 12.2 % (ref 11.5–14.5)
EST. GFR  (NO RACE VARIABLE): >60 ML/MIN/1.73 M^2
GLUCOSE SERPL-MCNC: 107 MG/DL (ref 70–110)
HBV CORE AB SERPL QL IA: NORMAL
HBV SURFACE AB SER-ACNC: <3 MIU/ML
HBV SURFACE AB SER-ACNC: NORMAL M[IU]/ML
HBV SURFACE AG SERPL QL IA: NORMAL
HCT VFR BLD AUTO: 47.1 % (ref 40–54)
HGB BLD-MCNC: 15.8 G/DL (ref 14–18)
HIV 1+2 AB+HIV1 P24 AG SERPL QL IA: NORMAL
IGA SERPL-MCNC: 351 MG/DL (ref 40–350)
IGG SERPL-MCNC: 1172 MG/DL (ref 650–1600)
IGM SERPL-MCNC: 28 MG/DL (ref 50–300)
IMM GRANULOCYTES # BLD AUTO: 0.05 K/UL (ref 0–0.04)
IMM GRANULOCYTES NFR BLD AUTO: 0.7 % (ref 0–0.5)
LYMPHOCYTES # BLD AUTO: 1.6 K/UL (ref 1–4.8)
LYMPHOCYTES NFR BLD: 23.8 % (ref 18–48)
MCH RBC QN AUTO: 31.2 PG (ref 27–31)
MCHC RBC AUTO-ENTMCNC: 33.5 G/DL (ref 32–36)
MCV RBC AUTO: 93 FL (ref 82–98)
MONOCYTES # BLD AUTO: 0.5 K/UL (ref 0.3–1)
MONOCYTES NFR BLD: 8 % (ref 4–15)
NEUTROPHILS # BLD AUTO: 4.3 K/UL (ref 1.8–7.7)
NEUTROPHILS NFR BLD: 64.4 % (ref 38–73)
NRBC BLD-RTO: 0 /100 WBC
PLATELET # BLD AUTO: 287 K/UL (ref 150–450)
PMV BLD AUTO: 8.7 FL (ref 9.2–12.9)
POTASSIUM SERPL-SCNC: 3.9 MMOL/L (ref 3.5–5.1)
PROT SERPL-MCNC: 7.7 G/DL (ref 6–8.4)
RBC # BLD AUTO: 5.07 M/UL (ref 4.6–6.2)
SODIUM SERPL-SCNC: 140 MMOL/L (ref 136–145)
WBC # BLD AUTO: 6.73 K/UL (ref 3.9–12.7)

## 2022-10-12 PROCEDURE — 86682 HELMINTH ANTIBODY: CPT | Performed by: STUDENT IN AN ORGANIZED HEALTH CARE EDUCATION/TRAINING PROGRAM

## 2022-10-12 PROCEDURE — 3074F SYST BP LT 130 MM HG: CPT | Mod: CPTII,S$GLB,, | Performed by: STUDENT IN AN ORGANIZED HEALTH CARE EDUCATION/TRAINING PROGRAM

## 2022-10-12 PROCEDURE — 86706 HEP B SURFACE ANTIBODY: CPT | Performed by: STUDENT IN AN ORGANIZED HEALTH CARE EDUCATION/TRAINING PROGRAM

## 2022-10-12 PROCEDURE — 99999 PR PBB SHADOW E&M-EST. PATIENT-LVL V: CPT | Mod: PBBFAC,,, | Performed by: STUDENT IN AN ORGANIZED HEALTH CARE EDUCATION/TRAINING PROGRAM

## 2022-10-12 PROCEDURE — 1160F PR REVIEW ALL MEDS BY PRESCRIBER/CLIN PHARMACIST DOCUMENTED: ICD-10-PCS | Mod: CPTII,S$GLB,, | Performed by: STUDENT IN AN ORGANIZED HEALTH CARE EDUCATION/TRAINING PROGRAM

## 2022-10-12 PROCEDURE — 80053 COMPREHEN METABOLIC PANEL: CPT | Performed by: STUDENT IN AN ORGANIZED HEALTH CARE EDUCATION/TRAINING PROGRAM

## 2022-10-12 PROCEDURE — 1159F PR MEDICATION LIST DOCUMENTED IN MEDICAL RECORD: ICD-10-PCS | Mod: CPTII,S$GLB,, | Performed by: STUDENT IN AN ORGANIZED HEALTH CARE EDUCATION/TRAINING PROGRAM

## 2022-10-12 PROCEDURE — 99499 UNLISTED E&M SERVICE: CPT | Mod: HCWC,S$GLB,, | Performed by: STUDENT IN AN ORGANIZED HEALTH CARE EDUCATION/TRAINING PROGRAM

## 2022-10-12 PROCEDURE — 99215 PR OFFICE/OUTPT VISIT, EST, LEVL V, 40-54 MIN: ICD-10-PCS | Mod: S$GLB,,, | Performed by: STUDENT IN AN ORGANIZED HEALTH CARE EDUCATION/TRAINING PROGRAM

## 2022-10-12 PROCEDURE — 3078F DIAST BP <80 MM HG: CPT | Mod: CPTII,S$GLB,, | Performed by: STUDENT IN AN ORGANIZED HEALTH CARE EDUCATION/TRAINING PROGRAM

## 2022-10-12 PROCEDURE — 87389 HIV-1 AG W/HIV-1&-2 AB AG IA: CPT | Performed by: STUDENT IN AN ORGANIZED HEALTH CARE EDUCATION/TRAINING PROGRAM

## 2022-10-12 PROCEDURE — 1160F RVW MEDS BY RX/DR IN RCRD: CPT | Mod: CPTII,S$GLB,, | Performed by: STUDENT IN AN ORGANIZED HEALTH CARE EDUCATION/TRAINING PROGRAM

## 2022-10-12 PROCEDURE — 99215 OFFICE O/P EST HI 40 MIN: CPT | Mod: S$GLB,,, | Performed by: STUDENT IN AN ORGANIZED HEALTH CARE EDUCATION/TRAINING PROGRAM

## 2022-10-12 PROCEDURE — 86787 VARICELLA-ZOSTER ANTIBODY: CPT | Performed by: STUDENT IN AN ORGANIZED HEALTH CARE EDUCATION/TRAINING PROGRAM

## 2022-10-12 PROCEDURE — 3074F PR MOST RECENT SYSTOLIC BLOOD PRESSURE < 130 MM HG: ICD-10-PCS | Mod: CPTII,S$GLB,, | Performed by: STUDENT IN AN ORGANIZED HEALTH CARE EDUCATION/TRAINING PROGRAM

## 2022-10-12 PROCEDURE — 82784 ASSAY IGA/IGD/IGG/IGM EACH: CPT | Mod: 59 | Performed by: STUDENT IN AN ORGANIZED HEALTH CARE EDUCATION/TRAINING PROGRAM

## 2022-10-12 PROCEDURE — 87340 HEPATITIS B SURFACE AG IA: CPT | Performed by: STUDENT IN AN ORGANIZED HEALTH CARE EDUCATION/TRAINING PROGRAM

## 2022-10-12 PROCEDURE — 36415 COLL VENOUS BLD VENIPUNCTURE: CPT | Performed by: STUDENT IN AN ORGANIZED HEALTH CARE EDUCATION/TRAINING PROGRAM

## 2022-10-12 PROCEDURE — 1159F MED LIST DOCD IN RCRD: CPT | Mod: CPTII,S$GLB,, | Performed by: STUDENT IN AN ORGANIZED HEALTH CARE EDUCATION/TRAINING PROGRAM

## 2022-10-12 PROCEDURE — 86704 HEP B CORE ANTIBODY TOTAL: CPT | Performed by: STUDENT IN AN ORGANIZED HEALTH CARE EDUCATION/TRAINING PROGRAM

## 2022-10-12 PROCEDURE — 3078F PR MOST RECENT DIASTOLIC BLOOD PRESSURE < 80 MM HG: ICD-10-PCS | Mod: CPTII,S$GLB,, | Performed by: STUDENT IN AN ORGANIZED HEALTH CARE EDUCATION/TRAINING PROGRAM

## 2022-10-12 PROCEDURE — 99999 PR PBB SHADOW E&M-EST. PATIENT-LVL V: ICD-10-PCS | Mod: PBBFAC,,, | Performed by: STUDENT IN AN ORGANIZED HEALTH CARE EDUCATION/TRAINING PROGRAM

## 2022-10-12 PROCEDURE — 99499 RISK ADDL DX/OHS AUDIT: ICD-10-PCS | Mod: HCWC,S$GLB,, | Performed by: STUDENT IN AN ORGANIZED HEALTH CARE EDUCATION/TRAINING PROGRAM

## 2022-10-12 PROCEDURE — 85025 COMPLETE CBC W/AUTO DIFF WBC: CPT | Performed by: STUDENT IN AN ORGANIZED HEALTH CARE EDUCATION/TRAINING PROGRAM

## 2022-10-12 RX ORDER — MODAFINIL 100 MG/1
100 TABLET ORAL DAILY
Qty: 30 TABLET | Refills: 0 | Status: SHIPPED | OUTPATIENT
Start: 2022-10-12 | End: 2023-08-30

## 2022-10-12 NOTE — PATIENT INSTRUCTIONS
Labs today  MRI   Trial of provigil 100mg daily as needed for fatigue  Physical and occupational therapy referral

## 2022-10-13 ENCOUNTER — CLINICAL SUPPORT (OUTPATIENT)
Dept: REHABILITATION | Facility: HOSPITAL | Age: 62
End: 2022-10-13
Attending: STUDENT IN AN ORGANIZED HEALTH CARE EDUCATION/TRAINING PROGRAM
Payer: MEDICARE

## 2022-10-13 DIAGNOSIS — R26.89 DRAGS LEG: ICD-10-CM

## 2022-10-13 DIAGNOSIS — R26.89 IMBALANCE: ICD-10-CM

## 2022-10-13 DIAGNOSIS — R26.9 GAIT ABNORMALITY: ICD-10-CM

## 2022-10-13 DIAGNOSIS — G35 MULTIPLE SCLEROSIS: Chronic | ICD-10-CM

## 2022-10-13 LAB
STRONGYLOIDES ANTIBODY IGG: NEGATIVE
VARICELLA INTERPRETATION: POSITIVE
VARICELLA ZOSTER IGG: 2.81 ISR (ref 0–0.9)

## 2022-10-13 PROCEDURE — 97162 PT EVAL MOD COMPLEX 30 MIN: CPT | Mod: PO

## 2022-10-13 NOTE — PROGRESS NOTES
[facility]   Occupational Therapy Neurological Rehabilitation   Initial Evaluation     Patient: Hernandez Hernandez  MRN: [id number]  Today's Date: 10/14/2022    See plan of care for details. LIANE Siu 10/14/2022

## 2022-10-13 NOTE — PLAN OF CARE
OUTPATIENT NEUROLOGICAL REHABILITATION  PHYSICAL THERAPY EVALUATION      Name: Hernandez Hernandez  Clinic Number: [id number]    Diagnosis:   Encounter Diagnoses   Name Primary?    Multiple sclerosis     Drags leg     Gait abnormality     Imbalance      Physician: Danica Anne MD  Physician Orders: PT Eval and Treat   Medical Diagnosis from Referral:   G35 (ICD-10-CM) - Multiple sclerosis   R25.1 (ICD-10-CM) - Tremor   Evaluation Date: 10/13/2022  Authorization Period Expiration: 10/12/23  Plan of Care Expiration: 01/05/23  Plan of Care Certification Period: 10/13/22 - 01/05/23   Visit # / Visits authorized: 1/1    Time In: 1138  Time Out: 1238  Total Appointment Time: 60 minutes    Precautions: Fall      History     PT Diagnosis: gait abnormality and imbalance    History of Present Illness: Hernandez is a 62 y.o. male that presents to [facility] secondary to MS and tremor.     Subjective     Date of onset: dx with MS in 1998   History of current condition per medical chart review, accuracy confirmed by patient: Diagnosed with MS after inability to walk/run in 1998. Symptoms persisted since then.     While walking in the hallway from waiting room to PT gym, pt tripped over his R foot and lunged forward, recovering to B feet with step-to strategy, hand support on wall, and modA from PT. Pt was then transferred to a transfer WC and brought back to the PT gym to avoid additional losses of balance and prevent a potential fall.     He reports falls numerous times per day due to leg fatigue that leads to tripping over his R foot. Does not walk with AFO or AD. Fatigue seems to come on with prolonged standing (which he reports can be 2-4 minutes with leaning on wall support) but is heavily exacerbated by heat. Most of the day he spends in an electric recliner. He drives independently with both feet.  He is here because his neurologist retired and his new neurologist suggested outpatient therapy. His  current dx is progressive MS.       Medical History:   Past Medical History:   Diagnosis Date    MS (multiple sclerosis)        Surgical History:   Hrenandez Hernandez  has a past surgical history that includes hydrocele.    Medications:   Hernandez has a current medication list which includes the following prescription(s): amitriptyline, b complex vitamins, cholecalciferol (vitamin d3), magnesium, melatonin, modafinil, tadalafil, and turmeric.    Allergies:   Review of patient's allergies indicates:  No Known Allergies     Imaging: Pt awaiting routine MRI.    Prior Therapy: has not had PT before  Social History: lives alone  Occupation: on disability    Falls in the past year: numerous falls per day for the past 2-3 years  Place of Residence (Steps/Adaptations/Levels[place] [address] [facility] [place]; pt able to navigate stairs with handrail  Prior Level of Function: independent with all ADLs/functional mobility  Current Level of Function: independent with all ADLs/functional mobility; numerous falls per day   DME owned:  none    Pain:  Current 0/10, worst 0/10, best 0/10   Location: N/A    Patients goals: he is here to hear PT recommendations for gait and balance      Objective     Patient's mobility presenting to therapy evaluation: walks with significant safety concerns  Follows commands: 100% of time   Speech: no deficits    Mental status: alert, oriented to person, place, and time, inappropriate safety awareness  Appearance: Casually dressed and Well groomed  Behavior:  calm, cooperative, and adequate rapport can be established  Attention Span and Concentration:  Normal    Dominant hand:  was always R handed, signs with L hand now due to R hand intention tremor    Sensation:   - Light Touch: Intact         Tone: increased   - Limbs/muscles affected: sustained clonus in B ankles (R>L)    Coordination:    - Fine motor:  Impaired: dysmetria bilaterally with FTN, intention tremor in R hand   - UE coordination:  Intact   -  "LE coordination:   Intact    ROM:   UPPER EXTREMITIES  (R) UE: see OT eval  (L) UE: see OT eval       LOWER EXTREMITIES  (R) LE Hip: WFL   Knee: WFL   Ankle: grossly decreased    (L) LE: Hip: WFL   Knee: WFL   Ankle: WFL    Strength: MMT grades below:     Lower Extremity Strength - true positions, some motor impersistence  Right LE  Eval 10/13/2022 Left LE Eval 10/13/2022   Hip Flexion: 4-/5 Hip Flexion: 5/5   Hip Extension:  4/5 Hip Extension: 4-/5   Hip Abduction: 2+/5 Hip Abduction: 4/5   Hip Adduction: 4/5 Hip Adduction 5/5   Knee Extension: 5/5 Knee Extension: 5/5   Knee Flexion: 3+/5 Knee Flexion: 5/5   Ankle Dorsiflexion: 2-/5 (limited range in gravity eliminated) Ankle Dorsiflexion: 5/5   Ankle Plantarflexion: 4/5 Ankle Plantarflexion: 5/5     Flexibility: impaired minimally at R ankle plantarflexors    HECTOR SENSORY TESTING:  (P= Pass, F= Fail; note any sway; hold each position for 30")  Condition 1: (firm surface/Romberg/EO) P   Condition 2: (firm surface/Romberg/EC) P min sway  Condition 3: (firm surface/tandem/EO) F 2 sec  Condition 4: (firm surface/tandem/EC) NT  Condition 5: (soft surface/Romberg/EO) F 3 sec  Condition 6: (soft surface/Romberg/EC) NT     Evaluation   R SLS NT  (<10 sec = HIGH FALL RISK)   L SLS NT  (<10 sec = HIGH FALL RISK)   30 sec chair rise 11 completed with BUE pushoff   5 x sit<>stand 13 seconds with BUE pushoff   TUG 12 seconds with RW  14 seconds with no AD; CGA     SSWS 0.86 m/sec (6m/7s) with RW; CGA  1.2 m/sec (6m/5s) with no AD; CGA   Fast walking speed 1.5 m/sec (6m/4s) with RW; CGA     Community Ambulator: > 1.4 m/s  Limited Community Ambulator: 0.6 - 0.8 m/s  Household Ambulator: < 0.4 m/s    30 second chair rise below average score:   Age  Men  Women  60-64  <14  <12  65-69  <12  <11  70-74  <12  <10  75-79  <11  <10  80-84  <10  <9  85-89  <8  <8  90-94  <7  <4  A below average score indicates a risk for fall.    5 x sit<>stand  >12 sec= fall risk for general " elderly  >16 sec= fall risk for Parkinson's disease  >10 sec= balance/vestibular dysfunction (<61 y/o)  >14.2 sec= balance/vestibular dysfunction (>61 y/o)  >12 sec= fall risk for CVA    GAIT ASSESSMENT  - AD used: Rolling Walker, no AD  - Assistance: CGA - min assistance  - Distance: home distances    Observed Gait Deviations:  Hernandez displays the following deviations with ambulation:  Abnormal, Antalgic, Circumducted, Drop-foot, Trendelenberg, Varus thrust, and B knee hyperextension (R>L), decreased sourav, increased time in double stance, decreased velocity of limb motion, decreased step length, decreased stride length, increased stride width, decreased swing-to-stance ratio, decreased toe-to-floor clearance, decreased weight-shifting ability, and increased trunk flexion ; occasional L vaulting noted to assist with R foot clearance during swing phase.    Impairments contributing to deviations/impairments: impaired balance, decreased flexibility, impaired motor control, impaired postural control, and decreased strength    Endurance Deficit: significant, pt reports he fatigues following 2 minutes of static standing    Education provided re: role of PT, goals for PT, scheduling, attendance policy - pt verbalized understanding. Discussed insurance limitations with pt.     Mace verbalized good understanding of education provided.   Pt has no cultural, educational or language barriers to learning provided.      NO TIME FOR FOTO         TREATMENT: None provided today. All time spent on evaluation.    Assessment     Hernandez is a 62 y.o. male referred to outpatient Physical Therapy presenting with a medical diagnosis of MS and tremors. Pt presents with gross strength deficits in the RLE (mild-moderate L hip weakness), most significantly in hip abduction, knee flexion, and ankle dorsiflexion. He is partially limited in ankle DF by mild muscle flexibility impairments of the R ankle plantarflexors but is likely appropriate for  an AFO at this time. Due to time constraints, pt's gait and mobility was not assessed with an AFO, but this topic was discussed and the pt was receptive to education and information regarding this option. Recommendations for orthoses and/or AD will be made at future follow-up appointments with supervising PT. Pt is at an elevated risk of falls due to poor static balance as the pt was unable to maintain balance in conditions 3 and 5 on the GST; pt deferred conditions 4 and 6 and SLS balance testing due to safety concerns. Pt exhibits numerous gait deficits that are the main contributing factor to his numerous falls per day; most notable deficits are R foot drop/R flat foot contact, R circumduction, intermittent L vaulting, hyperextension of B knees in B stance phases of the gait cycle, and decreased stance time on RLE. Pt also exhibits forward trunk lean which intermittently leads to increase in forward momentum, requiring him to take rapid steps in order to maintain upright posture and balance while walking. Time spent educating the pt on the role and goal of PT in the management of the pt's condition as he initially made statements regarding the normality of his gait and balance for him as an individual. Further assessments will be made at future follow up appointments with supervising PT with goals set or adjusted as indicated. PT is warranted to address these deficits listed above in order to decrease risk and number of falls, improve safety awareness, maintain current level of independence, and improve quality of life.    Pt rehab prognosis is Good. Pt will benefit from skilled outpatient physical therapy to address the deficits listed above in the problem list, provide pt/family education, and to maximize pt's level of independence in the home and community environment.     Plan of care discussed with patient: Yes    Patient's spiritual, cultural and educational needs considered and patient is agreeable to the  plan of care and goals as stated below:       Goals:  Short Term Goals: 4 weeks  Pt to be introduced to HEP and report > 50% compliance.  Pt to improve gross BLE strength by > 1/3 muscle grade on MMT.  Pt to improve TUG score to 13 sec with SPV and appropriate orthoses and/or AD to decrease risk of falls.  Pt to improve 30 sec chair rise score to 12.5 reps to show improved functional lower extremity endurance.  Pt to improve condition 3 on the GST to 5 seconds for improved balance in low vision environments with NBOS.   Pt to improve condition 6 on the GST to 8 seconds for improved balance in low vision environments on unstable surface.      Long Term Goals: 8 weeks   Pt to improve R ankle flexibility to WNL to improve fitting of R AFO.  Pt to improve gross B hip strength by > 2/3 muscle grade on MMT.  Pt to improve TUG score to 12 sec with SPV and appropriate orthoses and/or AD to decrease risk of falls.  Pt to improve 30 sec chair rise score to 12.5 reps to show improved functional lower extremity endurance.  Pt to improve condition 3 on the GST to 10 seconds for improved balance in low vision environments with NBOS.   Pt to improve condition 6 on the GST to 15 seconds for improved balance in low vision environments on unstable surface.     Anticipated Barriers for therapy: progressive dx, lack of PT/OT in the past    Medical Necessity is demonstrated by the following:    History  Co-morbidities and personal factors that may impact the plan of care Co-morbidities:   MS    Personal Factors:   attitudes, coping style     moderate   Examination  Body Structures and Functions, activity limitations and participation restrictions that may impact the plan of care Body Regions:   back  lower extremities  upper extremities  trunk    Body Systems:    gross symmetry  ROM  strength  gross coordinated movement  balance  gait  transfers  transitions  motor control  motor learning    Participation Restrictions:   N/a    Activity  limitations:   Learning and applying knowledge  no deficits    General Tasks and Commands  no deficits    Communication  no deficits    Mobility  walking    Self care  no deficits    Domestic Life  no deficits    Interactions/Relationships  no deficits    Life Areas  no deficits    Community and Social Life  community life         high   Clinical Presentation evolving clinical presentation with changing clinical characteristics moderate   Decision Making/ Complexity Score: moderate         Plan     Plan of care Certification: 10/13/2022 to 01/05/23.    Outpatient physical therapy 2 times weekly to include: Pt Education, Home Exercise Program, Therapeutic Exercises, Neuromuscular Re-education, Therapeutic Activities, Gait Training, Manual Therapy, and modalities(Ultrasound/Phonophoresis, Electrical Stimulation/TENS/Interferential and Moist Heat/Ice) PRN to achieve established goals. Pt may be seen by PTA as part of the rehabilitation team.     Cont PT for 8 weeks.     Shayne Anaya, PT  10/13/2022

## 2022-10-14 ENCOUNTER — CLINICAL SUPPORT (OUTPATIENT)
Dept: REHABILITATION | Facility: HOSPITAL | Age: 62
End: 2022-10-14
Attending: STUDENT IN AN ORGANIZED HEALTH CARE EDUCATION/TRAINING PROGRAM
Payer: MEDICARE

## 2022-10-14 DIAGNOSIS — G35 MULTIPLE SCLEROSIS: Chronic | ICD-10-CM

## 2022-10-14 DIAGNOSIS — R25.1 TREMOR: ICD-10-CM

## 2022-10-14 DIAGNOSIS — R27.8 COORDINATION IMPAIRMENTS: ICD-10-CM

## 2022-10-14 DIAGNOSIS — G25.2 POSTURAL TREMOR: Primary | ICD-10-CM

## 2022-10-14 PROCEDURE — 97166 OT EVAL MOD COMPLEX 45 MIN: CPT | Mod: PO

## 2022-10-14 PROCEDURE — 97530 THERAPEUTIC ACTIVITIES: CPT | Mod: PO

## 2022-10-14 NOTE — PLAN OF CARE
[facility]   Occupational Therapy Neurological Rehabilitation   Initial Evaluation     Patient: Hernandez Hernandez  MRN: [id number]  Today's Date: 10/14/2022    Therapy Diagnosis:   Encounter Diagnoses   Name Primary?    Multiple sclerosis     Tremor     Postural tremor Yes    Coordination impairments      Physician: Danica Anne MD  Physician Orders: Neuro Program     Medical Diagnosis: G35 (ICD-10-CM) - Multiple sclerosis R25.1 (ICD-10-CM) - Tremor   Recent Surgery: * No surgery found *  Evaluation Date: 10/14/2022  Plan of Care Expiration Period: 12/9/2022  Insurance Authorization period Expiration: 12/31/2022  Date of Return to MD: 12/16/2022 Neurology   Visit # / Visits Authorized: 1 / 1  FOTO: 1/3    Time In:10:45  Time Out: 11:45  Total Billable (one on one) Time: 60 minutes    Precautions: Standard and Fall    Subjective   Occupational Profile:  Prior Level of Function: difficulty running around age 38 at first symptoms.     History of Current Level of Function: 2-3 falls daily. Prefers to furniture walk or grab onto items - has never used DME. Tremor present for right upper extremity - he now uses left hand as more dominant. Has made self adaptations and modifications since diagnosed. Reported he is driving with 2 feet due to symptoms.     Living Environment: Pt lives in [facility] with elevator access. Long access from parking lot to condo. Tub/shower.   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   Driving: yes  Leisure: going out, driving   Equipment Used at Home: 0  Level of Assistance Required at home from caregivers: not required  Previous Therapy: none    Involved Side: Right  Dominant Side/Hand Dominance: Right  Date of Onset: Diagnosed in 1999   Imaging: pending     Patient's Goals for Occupational Therapy: to learn new ways to fine tune his functional indep and safety     Pain:  Pain Related Behaviors Observed: no      Functional Status      Functional Mobility:  Bed mobility: Mod I  Roll to left: Mod I  Roll to right: Mod I  Supine to sit: Mod I  Sit to supine: Mod I  Transfers to bed: Mod I  Transfers to toilet: Mod I  Car transfers: Mod I    ADL's:  Feeding: Mod I  Grooming: Mod I  Hygiene: Mod I  Upper Body Dressing: Mod I  Lower Body Dressing: Mod I  Toileting: Mod I  Bathing: Mod I- standing     IADL's:   Homecare: Mod I  Cooking: Mod I  Laundry: Mod I  Use of telephone: Mod I  Money management: Mod I  Medication management: Mod I  Handwriting:Mod I- use of left hand primarily   Technology Use:Mod I    Comments: management at [facility] completes maintenance     Objective   Cognitive Exam:  Oriented: Person, Place, Time, and Situation  Behaviors: Cooperative  Follows Commands/attention: Follows multistep  commands  Communication: clear/fluent; hyper verbal     Visual/Perceptual:  Tracking: impaired nystagmus at end ranges all directions  Saccades: impaired delayed all directions    Acuity: has bifocals  Convergence: impaired; >10 cm  Nystagmus: present    Comments: reported he is scheduled to see a neuro ophthalmologist   Additional: pt with exotropia of right eye/difficulty shifting past midline     Physical Exam:  Postural examination/scapula alignment: Rounded shoulder and Head forward  Joint integrity: Firm end feeling  Skin integrity: intact  Edema: none noted        Joint Evaluation: bilateral upper extremity AROM WFL    Fist: normal; hyper mobile for left 4th digit     Strength: 5/5 throughout bilateral upper extremities      Strength: (ROBERT Dynamometer in lbs.) Position II:     10/14/2022 10/14/2022    Right Left   Rung II 66.2 # 84.2 #   Norms for  Strength 60-69  Male: Right Left   63 lbs 56 lbs     Pinch Strength (Measured in psi)     10/14/2022 10/14/2022    Right Left   Key Pinch 15 psi 19.5 psi   3pt Pinch 15 psi 21 psi   2pt Pinch 12 psi 14 psi     Fine Motor Coordination: 9 Hole Peg Test    Right  10/14/2022 Left 10/14/2022                1:25.8 min              33.7 s     Box and Block  Right 10/14/2022 Left 10/14/2022              26              30     Gross motor coordination:   RICHARD (Rapid Alternating Movements): delayed right   Finger to Nose (3 times): dysmetria for right   Finger Flicks (coordination moving from digit flexion to digit extension): delayed right   Fist, open, flip 10 s: 4 on right ; 3 on  left     Tone:  Modified Lexa Scale:   0 - No increase in muscle tone    Sensation:  Hernandez  reports no complaints or changes     Balance:   Static Sitting- GOOD-: Takes MODERATE challenges from all directions but inconsistently  Dynamic Sitting- GOOD-: Takes MODERATE challenges from all directions but inconsistently  Static Standing - FAIR-: Maintains without assist but inconsistent   Dynamic Standing - defer to PT for details     Functional mobility:   Contact guard assist with no AD and with rolling walker    Endurance Deficit: mild                      CMS Impairment/Limitation/Restriction for FOTO Survey    Therapist reviewed FOTO scores for Hernandez Hernandez on 10/14/2022.   FOTO documents entered into [employer] - see Media section.    Limitation Score: 41%       Additional Treatment:     Total Treatment time separate from Evaluation time:15 therapeutic activity     -edu on proximal stability and points of stability- completed with fine motor testing with demo understanding  -completed problem solving for bringing groceries up from car- discussed purchase of cart to keep in car to bring things up in one load - to have bilateral hands on cart   -briefly discussed tremor managment external aids   -pt is a high fall risk- met patient in waiting are and brought back with rolling walker; pt escorted to waiting room by rehab technician at cessation of session     Education provided:   - role of Occupational Therapy in care, goals for Occupational Therapy for this plan of care,  scheduling      Assessment      Hernandez Hernandez is a 62 y.o. male referred to outpatient neurological occupational therapy and presents with a medical diagnosis of MS, resulting in Right upper extremity tremor, impaired safety awareness with all daily roles and routines and demonstrates limitations as described in the chart below. Pt has never had therapy for MS. He has been making self modifications and adaptations for his roles and routines for 20+ years. He is a high fall risk and will require added assistance for mobility and dynamic balance while in therapy clinic. Moreover, it is noted that patient has significant visual deficits and is a high safety risk with driving at this time. Following medical record review it is determined that patient will benefit from occupational therapy services in order to maximize functional use of right upper extremity.     Patient prognosis is Good   Patient will benefit from skilled outpatient Occupational Therapy to address the deficits stated above and in the chart below, provide patient/family education, and to maximize patient's level of independence.     Plan of care discussed with patient: Yes  Patient's spiritual, cultural and educational needs considered and patient is agreeable to the plan of care and goals as stated below.     Anticipated Barriers for therapy: none noted     Past Medical History/Physical Systems Review:   Past Medical History:  Hernandez Hernandez  has a past medical history of MS (multiple sclerosis).     Past Surgical History:  Hernandez Hernandez  has a past surgical history that includes hydrocele.    Current Medications:  Hernandez has a current medication list which includes the following prescription(s): amitriptyline, b complex vitamins, cholecalciferol (vitamin d3), magnesium, melatonin, modafinil, tadalafil, and turmeric.    Allergies:  Review of patient's allergies indicates:  No Known Allergies     Medical Necessity is demonstrated by the following  Profile and History Assessment of  Occupational Performance Level of Clinical Decision Making Complexity Score   Occupational Profile:   Hernandez Hernandez is a 62 y.o. male who lives alone and is currently retired as a . Hernandez Hernandez has difficulty with  Balance with daily falls and impaired safety with all prior roles and routines.    His main goal for therapy is to learn new ways to fine tune his functional indep and safety     Comorbidities:    has a past medical history of MS (multiple sclerosis).    Medical and Therapy History Review:   Brief   Performance Deficits    Physical:  Muscle Endurance   Strength  Pinch Strength  Gross Motor Coordination  Fine Motor Coordination  Visual Functions  Postural Control    Cognitive:  Attention    Psychosocial:    No Deficits     Clinical Decision Making:  moderate    Assessment Process:  Detailed Assessments    Modification/Need for Assistance:  Minimal-Moderate Modifications/Assistance    Intervention Selection:  Several Treatment Options       moderate  Based on PMHX, co morbidities , data from assessments and functional level of assistance required with task and clinical presentation directly impacting function.         Goals:  Long Term Goals:  Time frame: 8 weeks  - Pt will decrease time on 9HPT by 15 seconds right hand to improve fine motor speed and coordination needed to perform bimanual daily tasks and activities.   - Pt will complete Box and Blocks Test with PING, transferring 28 blocks, to demonstrate improved gross manual coordination needed for ADL and IADL tasks.   - Pt will be independent with Home Exercise Program (HEP)/Home Activity Program (HAP) to promote long term maintenance of progress made in therapy.   - Pt will demonstrate consistent use of compensatory strategies/adaptive equipment during bimanual/fine motor tasks to maximize UE control.   - Pt will improve FOTO limitation score to less than or equal to 38% for improved self perception of functional performance with  daily activities.     Short Term Goals:  Time frame: 4 weeks  - Pt will decrease time on 9HPT by 10 seconds right hand to improve fine motor speed and coordination needed to perform bimanual daily tasks and activities.   - Pt will demonstrate knowledge and use of energy conservation and work simplification strategies during ADL and IADL tasks.   - Pt will demonstrate consistent use of 3 point stabilization technique during bimanual/fine motor tasks to maximize UE control.   - Pt will be independent with Home Exercise Program (HEP)/Home Activity Program (HAP) and report at least 50% compliance.     Goals to be adjusted and added within plan of care as appropriate.     The following goals were discussed with the patient and patient is in agreement with them as to be addressed in the treatment plan.     Plan   Certification Period/Plan of care expiration: 10/14/2022 to 12/9/2022.    Outpatient Occupational Therapy 2 times weekly for 8 weeks to include the following interventions: Neuromuscular Re-ed, Patient Education, Self Care, Therapeutic Activities, and Therapeutic Exercise.      LIAEN Medina, Neuro [facility] Certified   Neuro Occupational Therapist   [facility] - [facility]  10/14/2022       I certify the need for these services furnished under this plan of treatment and while under my care.  ____________________________________ Physician/Referring Practitioner   Date of Signature

## 2022-10-19 LAB
JCPYV AB SERPL QL IA: POSITIVE
JCV INDEX: 1.34

## 2022-10-25 ENCOUNTER — CLINICAL SUPPORT (OUTPATIENT)
Dept: REHABILITATION | Facility: HOSPITAL | Age: 62
End: 2022-10-25
Attending: FAMILY MEDICINE
Payer: MEDICARE

## 2022-10-25 DIAGNOSIS — R26.89 IMBALANCE: ICD-10-CM

## 2022-10-25 DIAGNOSIS — R26.9 GAIT ABNORMALITY: Primary | ICD-10-CM

## 2022-10-25 PROCEDURE — 97116 GAIT TRAINING THERAPY: CPT | Mod: PO

## 2022-10-25 PROCEDURE — 97110 THERAPEUTIC EXERCISES: CPT | Mod: PO

## 2022-10-25 NOTE — PROGRESS NOTES
[facility]   Physical Therapy Treatment Note     Name: Hernandez Hernandez  Clinic Number: [id number]    Therapy Diagnosis:   Encounter Diagnoses   Name Primary?    Gait abnormality Yes    Imbalance      Physician: Danica Anne MD    Visit Date: 10/25/2022    Physician Orders: PT Eval and Treat   Medical Diagnosis from Referral:   G35 (ICD-10-CM) - Multiple sclerosis   R25.1 (ICD-10-CM) - Tremor   Evaluation Date: 10/13/2022  Authorization Period Expiration: 22  Plan of Care Expiration: 23  Plan of Care Certification Period: 10/13/22 - 23   Visit # / Visits authorized:  (+ initial eval)    PTA Visit #: 0/     Time In: 1145  Time Out: 1230  Total Billable Time: 45 minutes    Precautions: Fall, Standard    SUBJECTIVE     Pt reports: he is doing well this morning  He will be provided with home exercise program at today's visit.  Response to previous treatment: no adverse effects  Functional change: ongoing    Pain: 0/10  Location: N/A     OBJECTIVE     Objective Measures updated at progress report unless specified.     Treatment     Hernandez received the treatments listed below:      therapeutic exercises to develop strength, endurance, ROM, flexibility, posture, and core stabilization for 10 minutes including:    HEP:  - 1 x 10 supine bridges with hold for diaphragmatic breathing each rep  - 1 x 10 hooklying hip abduction with green theraband, verbal cues for slow return to center    therapeutic activities to improve functional performance for 0 minutes, including:    neuromuscular re-education activities to improve: Balance, Coordination, Kinesthetic, Sense, Proprioception, and Posture for 0 minutes. The following activities were included:    gait training to improve functional mobility and safety for 35 minutes, includin feet ambulating with RW   200 feet ambulating with RW and R Swedish knee cage  300 feet ambulating with RW, R Swedish knee cage, and R ankle DF  assist wrap  6 laps at ballet bar ambulating with CGA/no UE support with R Swedish knee cage and R ankle DF assist wrap    Time above includes time to don/doff DF assist wrap and adjusting Swedish knee cage.          Patient Education and Home Exercises     Home Exercises Provided and Patient Education Provided     Education provided:   - 10/25/22: initial HEP provided with hooklying clams with green theraband and supine bridges    Written Home Exercises Provided: yes. Exercises were reviewed and Hernandez was able to demonstrate them prior to the end of the session.  Hernandez demonstrated good  understanding of the education provided. See EMR under Patient Instructions for exercises provided during therapy sessions    ASSESSMENT     Hernandez tolerated his first follow up session well. We established an initial HEP focusing on general hip strengthening (see above). The majority of our session today focused on ambulation. Improved stability and deviations noted with use of Swedish knee cage and DF assist. Continue with POC.     Hernandez Is progressing well towards his goals.   Pt prognosis is Good.     Pt will continue to benefit from skilled outpatient physical therapy to address the deficits listed in the problem list box on initial evaluation, provide pt/family education and to maximize pt's level of independence in the home and community environment.     Pt's spiritual, cultural and educational needs considered and pt agreeable to plan of care and goals.     Anticipated barriers to physical therapy: progressive nature of condition    Goals:  Short Term Goals: 4 weeks  Pt to be introduced to HEP and report > 50% compliance.  Pt to improve gross BLE strength by > 1/3 muscle grade on MMT.  Pt to improve TUG score to 13 sec with SPV and appropriate orthoses and/or AD to decrease risk of falls.  Pt to improve 30 sec chair rise score to 12.5 reps to show improved functional lower extremity endurance.  Pt to improve condition 3 on the  GST to 5 seconds for improved balance in low vision environments with NBOS.   Pt to improve condition 6 on the GST to 8 seconds for improved balance in low vision environments on unstable surface.      Long Term Goals: 8 weeks   Pt to improve R ankle flexibility to WNL to improve fitting of R AFO.  Pt to improve gross B hip strength by > 2/3 muscle grade on MMT.  Pt to improve TUG score to 12 sec with SPV and appropriate orthoses and/or AD to decrease risk of falls.  Pt to improve 30 sec chair rise score to 12.5 reps to show improved functional lower extremity endurance.  Pt to improve condition 3 on the GST to 10 seconds for improved balance in low vision environments with NBOS.   Pt to improve condition 6 on the GST to 15 seconds for improved balance in low vision environments on unstable surface.     PLAN     Initiate balance training next visit.     Shayne Anaya, PT

## 2022-10-26 NOTE — PROGRESS NOTES
"  [facility]   Occupational Therapy Neurological Rehabilitation   Treatment Note   Name: Hernandez Hernandez  MRN: [id number]  Today's Date: 10/27/2022    Therapy Diagnosis:   Encounter Diagnosis   Name Primary?    Coordination impairments Yes     Physician: Danica Anne MD  Physician Orders: Neuro Program      Medical Diagnosis: G35 (ICD-10-CM) - Multiple sclerosis R25.1 (ICD-10-CM) - Tremor   Recent Surgery: * No surgery found *  Evaluation Date: 10/14/2022  Plan of Care Expiration Period: 12/9/2022  Insurance Authorization period Expiration: 12/31/2022  Date of Return to MD: 10/31: ophamology; MRI 11/1/22; 12/16/2022 Neurology   FOTO: 1/3    Visit # / Visits Authorized: 1 / 12 (+evaluation)  Time In:9:30 (late to 9:15 appointment)  Time Out: 10:10  Total Billable Time: 40 minutes    Precautions:   Standard and Fall    Subjective   Pt reports: no complaints; "I sometimes just get on my knees in the condo because I know gravity will eventually bring me there"    Response to previous treatment:evaluation only   Functional change: ongoing   Patient's Goals for Occupational Therapy: to learn new ways to fine tune his functional indep and safety     Pain upon arrival: 0/10  Location:n/a  Pain at cessation of session: 0/10    Objective     Hernandez participated in dynamic functional therapeutic activities to improve functional performance for 40  minutes, including:  Seated at table top:  -edu on tremor managment technique of bilateral upper extremity points of stability with demo understanding  -edu on additional proprioceptive input with addition of weight to aid in tremor managment  (ie: weighted utensils, wrist weights)  -completed fine motor task with 2.5# wrist weight on right for noted improvement in managment  ; 2# on left with improvement in closed chained activity  -2.5# wrist weight for open chained activity of reaching for hanging weighted clothes pins from top shelf    -seated chair tricep push ups " x2 sets of 10 reps; edu on weight bearing and not locking out joints    Pt received from waiting room and assisted to therapy gym with rolling walker and contact guard assist   Assisted to car with rolling walker and contact guard assist- one minor LOB with min(A) for recovery (right toe caught welcome mat)      Home Exercises and Education Provided   Education provided:   - use of cart at condo to bring up groceries in 1 trip from car   - edu on spoon method for energy conservation   - points of stability for tremor managment   - wrist weights for proprioceptive input   - role of Occupational Therapy in care, goals for Occupational Therapy for this plan of care, scheduling   - Progress towards goals     Written Home Exercises Provided:  none  .    See EMR under Patient Instructions for exercises provided 10/27/2022: Wire grocery cart; energy conservation; spoon method; wrist weights      Assessment   Hernandez was referred to outpatient neurological occupational therapy and presents with a medical diagnosis of MS, resulting in Right upper extremity tremor, impaired safety awareness with all daily roles and routines. Mace demo openness for education on tremor managment techniques. He demo improvement in modulation of control with right upper extremity with 2.5# wrist weight. Pt demo visual impairments noted with handing items with closing of right eye- he denies diplopia.     Moreover, pt noted to have parked in completely off center (to right) in parking lot. He is a high safety concern for driving- discussed with patient on this date. He would benefit from formal driving evaluation to evaluate overall safety.    Hernandez is progressing well towards his goals and there are no updates to goals at this time. Pt prognosis is Good.     Pt will continue to benefit from skilled outpatient occupational therapy to address the deficits listed in the problem list on initial evaluation provide pt/family education and to maximize pt's  level of independence in the home and community environment.     Anticipated barriers to occupational therapy: insight     Pt's spiritual, cultural and educational needs considered and pt agreeable to plan of care and goals.    Goals:  Long Term Goals:  Time frame: 8 weeks  - Pt will decrease time on 9HPT by 15 seconds right hand to improve fine motor speed and coordination needed to perform bimanual daily tasks and activities. Ongoing   - Pt will complete Box and Blocks Test with RUE, transferring 28 blocks, to demonstrate improved gross manual coordination needed for ADL and IADL tasks. Ongoing   - Pt will be independent with Home Exercise Program (HEP)/Home Activity Program (HAP) to promote long term maintenance of progress made in therapy. Ongoing   - Pt will demonstrate consistent use of compensatory strategies/adaptive equipment during bimanual/fine motor tasks to maximize UE control. Ongoing   - Pt will improve FOTO limitation score to less than or equal to 38% for improved self perception of functional performance with daily activities. Ongoing      Short Term Goals:  Time frame: 4 weeks  - Pt will decrease time on 9HPT by 10 seconds right hand to improve fine motor speed and coordination needed to perform bimanual daily tasks and activities. Ongoing   - Pt will demonstrate knowledge and use of energy conservation and work simplification strategies during ADL and IADL tasks. Ongoing   - Pt will demonstrate consistent use of 3 point stabilization technique during bimanual/fine motor tasks to maximize UE control. Ongoing   - Pt will be independent with Home Exercise Program (HEP)/Home Activity Program (HAP) and report at least 50% compliance. Ongoing      Goals to be adjusted and added within plan of care as appropriate.      The following goals were discussed with the patient and patient is in agreement with them as to be addressed in the treatment plan.     Plan   Certification Period/Plan of care  expiration: 10/14/2022 to 12/9/2022.     Outpatient Occupational Therapy 2 times weekly for 8 weeks to include the following interventions: Neuromuscular Re-ed, Patient Education, Self Care, Therapeutic Activities, and Therapeutic Exercise.     Updates/Grading for next session: Vision testing; weight bearing       LIANE Medina, Neuro [facility] Certified   Neuro Occupational Therapist   [facility]   10/27/2022

## 2022-10-27 ENCOUNTER — CLINICAL SUPPORT (OUTPATIENT)
Dept: REHABILITATION | Facility: HOSPITAL | Age: 62
End: 2022-10-27
Attending: FAMILY MEDICINE
Payer: MEDICARE

## 2022-10-27 ENCOUNTER — DOCUMENTATION ONLY (OUTPATIENT)
Dept: NEUROLOGY | Facility: CLINIC | Age: 62
End: 2022-10-27
Payer: MEDICARE

## 2022-10-27 DIAGNOSIS — R27.8 COORDINATION IMPAIRMENTS: Primary | ICD-10-CM

## 2022-10-27 PROCEDURE — 97530 THERAPEUTIC ACTIVITIES: CPT | Mod: PO

## 2022-10-31 ENCOUNTER — CLINICAL SUPPORT (OUTPATIENT)
Dept: REHABILITATION | Facility: HOSPITAL | Age: 62
End: 2022-10-31
Attending: STUDENT IN AN ORGANIZED HEALTH CARE EDUCATION/TRAINING PROGRAM
Payer: MEDICARE

## 2022-10-31 DIAGNOSIS — R27.8 COORDINATION IMPAIRMENTS: Primary | ICD-10-CM

## 2022-10-31 DIAGNOSIS — R26.89 IMBALANCE: ICD-10-CM

## 2022-10-31 DIAGNOSIS — R26.9 GAIT ABNORMALITY: Primary | ICD-10-CM

## 2022-10-31 PROCEDURE — 97116 GAIT TRAINING THERAPY: CPT | Mod: PO,CQ

## 2022-10-31 PROCEDURE — 97110 THERAPEUTIC EXERCISES: CPT | Mod: PO,CQ

## 2022-10-31 PROCEDURE — 97530 THERAPEUTIC ACTIVITIES: CPT | Mod: PO

## 2022-10-31 NOTE — PROGRESS NOTES
[facility]   Physical Therapy Treatment Note     Name: Hernandez Hernandez  Clinic Number: [id number]    Therapy Diagnosis:   Encounter Diagnoses   Name Primary?    Gait abnormality Yes    Imbalance          Physician: Danica Anne MD    Visit Date: 10/31/2022    Physician Orders: PT Eval and Treat   Medical Diagnosis from Referral:   G35 (ICD-10-CM) - Multiple sclerosis   R25.1 (ICD-10-CM) - Tremor   Evaluation Date: 10/13/2022  Authorization Period Expiration: 12/31/22  Plan of Care Expiration: 01/05/23  Plan of Care Certification Period: 10/13/22 - 01/05/23   Visit # / Visits authorized: 3/12 (+ initial eval)    PTA Visit #: 1/5     Time In: 1100  Time Out: 1145  Total Billable Time: 45 minutes    Precautions: Fall, Standard    SUBJECTIVE     Pt reports: he is doing well this morning  He will be provided with home exercise program at today's visit.  Response to previous treatment: no adverse effects  Functional change: ongoing    Pain: 0/10  Location: N/A     OBJECTIVE     Objective Measures updated at progress report unless specified.     Treatment     Hernandez received the treatments listed below:      therapeutic exercises to develop strength, endurance, ROM, flexibility, posture, and core stabilization for 10 minutes including:    HEP:  - 1 x 10 supine bridges with hold for diaphragmatic breathing each rep  - 1 x 10 hooklying hip abduction with green theraband, verbal cues for slow return to center    therapeutic activities to improve functional performance for 0 minutes, including:    neuromuscular re-education activities to improve: Balance, Coordination, Kinesthetic, Sense, Proprioception, and Posture for 5 minutes. The following activities were included:    Seated: plantarflexion/dorsiflexion on fitter 2 minutes with 3# weight under forefoot    gait training to improve functional mobility and safety for 30 minutes, including: With knee cage and carbon fiber AFO    400 feet ambulating  with RW   200 feet ambulating with RW and R Swedish knee cage and carbon fiber AFO   300 feet ambulating with RW, R Swedish knee cage, and carbon fiber AFO   6 laps at ballet bar ambulating with CGA/no UE support with R Swedish knee cage and carbon fiber AFO     Time above includes time to don/doff DF assist wrap and adjusting Swedish knee cage.      Patient Education and Home Exercises     Home Exercises Provided and Patient Education Provided     Education provided:   - 10/25/22: initial HEP provided with hooklying clams with green theraband and supine bridges    Written Home Exercises Provided: yes. Exercises were reviewed and Hernandez was able to demonstrate them prior to the end of the session.  Hernandez demonstrated good  understanding of the education provided. See EMR under Patient Instructions for exercises provided during therapy sessions    ASSESSMENT     Hernandez tolerated his first follow up session well. We established an initial HEP focusing on general hip strengthening (see above). The majority of our session today focused on ambulation. Improved stability and deviations noted with use of Swedish knee cage and carbon fiber AFO. Continue with POC.     Hernandez Is progressing well towards his goals.   Pt prognosis is Good.     Pt will continue to benefit from skilled outpatient physical therapy to address the deficits listed in the problem list box on initial evaluation, provide pt/family education and to maximize pt's level of independence in the home and community environment.     Pt's spiritual, cultural and educational needs considered and pt agreeable to plan of care and goals.     Anticipated barriers to physical therapy: progressive nature of condition    Goals:  Short Term Goals: 4 weeks  Pt to be introduced to HEP and report > 50% compliance.  Pt to improve gross BLE strength by > 1/3 muscle grade on MMT.  Pt to improve TUG score to 13 sec with SPV and appropriate orthoses and/or AD to decrease risk of  falls.  Pt to improve 30 sec chair rise score to 12.5 reps to show improved functional lower extremity endurance.  Pt to improve condition 3 on the GST to 5 seconds for improved balance in low vision environments with NBOS.   Pt to improve condition 6 on the GST to 8 seconds for improved balance in low vision environments on unstable surface.      Long Term Goals: 8 weeks   Pt to improve R ankle flexibility to WNL to improve fitting of R AFO.  Pt to improve gross B hip strength by > 2/3 muscle grade on MMT.  Pt to improve TUG score to 12 sec with SPV and appropriate orthoses and/or AD to decrease risk of falls.  Pt to improve 30 sec chair rise score to 12.5 reps to show improved functional lower extremity endurance.  Pt to improve condition 3 on the GST to 10 seconds for improved balance in low vision environments with NBOS.   Pt to improve condition 6 on the GST to 15 seconds for improved balance in low vision environments on unstable surface.     PLAN     Initiate balance training next visit.     Edwin Peck, PTA

## 2022-10-31 NOTE — PROGRESS NOTES
"  [facility]   Occupational Therapy Neurological Rehabilitation   Treatment Note   Name: Hernandez Hernandez  MRN: [id number]  Today's Date: 10/31/2022    Therapy Diagnosis:   Encounter Diagnosis   Name Primary?    Coordination impairments Yes     Physician: Danica Anne MD  Physician Orders: Neuro Program      Medical Diagnosis: G35 (ICD-10-CM) - Multiple sclerosis R25.1 (ICD-10-CM) - Tremor   Recent Surgery: * No surgery found *  Evaluation Date: 10/14/2022  Plan of Care Expiration Period: 12/9/2022  Insurance Authorization period Expiration: 12/31/2022  Date of Return to MD: 10/31: ophamology; MRI 11/1/22; 12/16/2022 Neurology   FOTO: 1/3    Visit # / Visits Authorized: 2 / 12 (+evaluation)  Time In: 11:45  Time Out: 12:34  Total Billable Time: 49 minutes    Precautions:   Standard and Fall    Subjective   Pt reports: "I don't know why I do that" - in response to holding right eye closed on occasion with task     Response to previous treatment: no complaints  Functional change: ongoing   Patient's Goals for Occupational Therapy: to learn new ways to fine tune his functional indep and safety     Pain upon arrival: 0/10  Location:n/a  Pain at cessation of session: 0/10    Objective     Hernandez participated in dynamic functional therapeutic activities to improve functional performance for 49 minutes, including:  Seated at table top: visual assessment     Perceptual testing:   Letter cancellation:  26/34  completed in 2 minute 14 seconds, average time for completion is 1 minute 15 seconds   Line bisection:  1 incorrect in lower left of page  Maze Test - <60 s  seconds, 1 error(s)  (Cut point score is 60 seconds or less with 1 error or less)   Trail Making Test A - 2.02 min; 0 error(s) (Average 29 seconds, Deficient > 78 seconds, rule of thumb: most in 90 seconds)  Trail Making Test B - 4.13 min, 0 error(s) (Average 75 seconds, Deficient > 273 seconds, rule of thumb: most in 3 minutes)  Motor Free Visual " Perception Test (MVPT),  which assesses figure ground, visual discrimination, spatial relationships, visual closure and visual memory, 30/36, where testing was competed in 19 minutes 04 seconds, average time for completion is 7 minutes  Auditory discrimination: WFL     Pt received from waiting room - use of gait belt and therapy rolling walker into and out of gym with contact guard assist-min(A)       Home Exercises and Education Provided   Education provided:   - use of cart at condo to bring up groceries in 1 trip from car   - edu on spoon method for energy conservation   - points of stability for tremor managment   - wrist weights for proprioceptive input   - role of Occupational Therapy in care, goals for Occupational Therapy for this plan of care, scheduling   - Progress towards goals     Written Home Exercises Provided:  none  .    See EMR under Patient Instructions for exercises provided 10/27/2022: Wire grocery cart; energy conservation; spoon method; wrist weights      Assessment   Hernandez was referred to outpatient neurological occupational therapy and presents with a medical diagnosis of MS, resulting in Right upper extremity tremor, impaired safety awareness with all daily roles and routines. Hernandez is a safety risk with driving due to visual deficits. He demo occasionally closing his right eye with task; however, denies diplopia. He demo difficulty with visual closure on MVPT testing. Moreover, he requires added processing for all tasks.     He would benefit from OT driving evaluation for best evaluation of on the road safety.     Hernandez is progressing well towards his goals and there are no updates to goals at this time. Pt prognosis is Good.     Pt will continue to benefit from skilled outpatient occupational therapy to address the deficits listed in the problem list on initial evaluation provide pt/family education and to maximize pt's level of independence in the home and community environment.      Anticipated barriers to occupational therapy: insight     Pt's spiritual, cultural and educational needs considered and pt agreeable to plan of care and goals.    Goals:  Long Term Goals:  Time frame: 8 weeks  - Pt will decrease time on 9HPT by 15 seconds right hand to improve fine motor speed and coordination needed to perform bimanual daily tasks and activities. Ongoing   - Pt will complete Box and Blocks Test with RUE, transferring 28 blocks, to demonstrate improved gross manual coordination needed for ADL and IADL tasks. Ongoing   - Pt will be independent with Home Exercise Program (HEP)/Home Activity Program (HAP) to promote long term maintenance of progress made in therapy. Ongoing   - Pt will demonstrate consistent use of compensatory strategies/adaptive equipment during bimanual/fine motor tasks to maximize UE control. Ongoing   - Pt will improve FOTO limitation score to less than or equal to 38% for improved self perception of functional performance with daily activities. Ongoing      Short Term Goals:  Time frame: 4 weeks  - Pt will decrease time on 9HPT by 10 seconds right hand to improve fine motor speed and coordination needed to perform bimanual daily tasks and activities. Ongoing   - Pt will demonstrate knowledge and use of energy conservation and work simplification strategies during ADL and IADL tasks. Ongoing   - Pt will demonstrate consistent use of 3 point stabilization technique during bimanual/fine motor tasks to maximize UE control. Ongoing   - Pt will be independent with Home Exercise Program (HEP)/Home Activity Program (HAP) and report at least 50% compliance. Ongoing      Goals to be adjusted and added within plan of care as appropriate.      The following goals were discussed with the patient and patient is in agreement with them as to be addressed in the treatment plan.     Plan   Certification Period/Plan of care expiration: 10/14/2022 to 12/9/2022.     Outpatient Occupational  Therapy 2 times weekly for 8 weeks to include the following interventions: Neuromuscular Re-ed, Patient Education, Self Care, Therapeutic Activities, and Therapeutic Exercise.     Updates/Grading for next session: SLUMS; weight bearing       LIANE Medina Neuro MELISSA Certified   Neuro Occupational Therapist   [facility] - [facility]   10/31/2022

## 2022-11-01 ENCOUNTER — HOSPITAL ENCOUNTER (OUTPATIENT)
Dept: RADIOLOGY | Facility: HOSPITAL | Age: 62
Discharge: HOME | End: 2022-11-01
Attending: STUDENT IN AN ORGANIZED HEALTH CARE EDUCATION/TRAINING PROGRAM
Payer: MEDICARE

## 2022-11-01 DIAGNOSIS — G35 MULTIPLE SCLEROSIS: ICD-10-CM

## 2022-11-01 PROCEDURE — 72156 MRI NECK SPINE W/O & W/DYE: CPT | Mod: TC

## 2022-11-01 PROCEDURE — 70553 MRI BRAIN DEMYELINATING W/ WO CONTRAST: ICD-10-PCS | Mod: 26,,, | Performed by: RADIOLOGY

## 2022-11-01 PROCEDURE — 70553 MRI BRAIN STEM W/O & W/DYE: CPT | Mod: 26,,, | Performed by: RADIOLOGY

## 2022-11-01 PROCEDURE — 72157 MRI CHEST SPINE W/O & W/DYE: CPT | Mod: 26,,, | Performed by: RADIOLOGY

## 2022-11-01 PROCEDURE — 70553 MRI BRAIN STEM W/O & W/DYE: CPT | Mod: TC

## 2022-11-01 PROCEDURE — 72157 MRI THORACIC SPINE DEMYELINATING W W/O CONTRAST: ICD-10-PCS | Mod: 26,,, | Performed by: RADIOLOGY

## 2022-11-01 PROCEDURE — 72156 MRI NECK SPINE W/O & W/DYE: CPT | Mod: 26,,, | Performed by: RADIOLOGY

## 2022-11-01 PROCEDURE — 25500020 PHARM REV CODE 255: Performed by: STUDENT IN AN ORGANIZED HEALTH CARE EDUCATION/TRAINING PROGRAM

## 2022-11-01 PROCEDURE — A9585 GADOBUTROL INJECTION: HCPCS | Performed by: STUDENT IN AN ORGANIZED HEALTH CARE EDUCATION/TRAINING PROGRAM

## 2022-11-01 PROCEDURE — 72156 MRI CERVICAL SPINE DEMYELINATING W W/O CONTRAST: ICD-10-PCS | Mod: 26,,, | Performed by: RADIOLOGY

## 2022-11-01 PROCEDURE — 72157 MRI CHEST SPINE W/O & W/DYE: CPT | Mod: TC

## 2022-11-01 RX ORDER — GADOBUTROL 604.72 MG/ML
8 INJECTION INTRAVENOUS
Status: COMPLETED | OUTPATIENT
Start: 2022-11-01 | End: 2022-11-01

## 2022-11-01 RX ADMIN — GADOBUTROL 8 ML: 604.72 INJECTION INTRAVENOUS at 04:11

## 2022-11-02 ENCOUNTER — DOCUMENTATION ONLY (OUTPATIENT)
Dept: NEUROLOGY | Facility: CLINIC | Age: 62
End: 2022-11-02
Payer: MEDICARE

## 2022-11-03 NOTE — PROGRESS NOTES
[facility]   Occupational Therapy Neurological Rehabilitation   Treatment Note   Name: Hernandez Hernandez  MRN: [id number]  Today's Date: 11/4/2022    Therapy Diagnosis:   No diagnosis found.    Physician: Danica Anne MD  Physician Orders: Neuro Program      Medical Diagnosis: G35 (ICD-10-CM) - Multiple sclerosis R25.1 (ICD-10-CM) - Tremor   Recent Surgery: * No surgery found *  Evaluation Date: 10/14/2022  Plan of Care Expiration Period: 12/9/2022  Insurance Authorization period Expiration: 12/31/2022  Date of Return to MD: 10/31: ophamology; MRI 11/1/22; 12/16/2022 Neurology   FOTO: 1/3    Visit # / Visits Authorized: 3 / 12 (+evaluation)  Time In: 12:15  Time Out: 1:00  Total Billable Time: 45 minutes    Precautions:   Standard and Fall    Subjective   Pt reports: he is making adaptations for functional task at home     Response to previous treatment: no complaints  Functional change: ongoing   Patient's Goals for Occupational Therapy: to learn new ways to fine tune his functional indep and safety     Pain upon arrival: 0/10  Location:n/a  Pain at cessation of session: 0/10    Objective     Hernandez participated in dynamic functional therapeutic activities to improve functional performance for 30 minutes, including:  Pt received from waiting room- gait belt and RW to gym area with contact guard assist   -seated at table top:   SLUM completion: 22/30 - can be found in [employer]     Edu on [facility] portal set up for access to medical chart and education - discussed set up at the [facility]    Hernandez received therapeutic exercises for 15 minutes including:  -2.5# wrist weights for bilateral upper extremity reaching task; demo understanding for points of stability     Quadruped:   -x2 sets of 10 reps: scapular push ups  -bird dog x8 reps with contact guard assist-min(A)        Handoff to PT at cessation of session     Home Exercises and Education Provided   Education provided:   - use of cart at [facility] to bring up  groceries in 1 trip from car   - edu on spoon method for energy conservation   - points of stability for tremor managment   - wrist weights for proprioceptive input   - role of Occupational Therapy in care, goals for Occupational Therapy for this plan of care, scheduling   - Progress towards goals     Written Home Exercises Provided:  none  .    See EMR under Patient Instructions for exercises provided 10/27/2022: Wire grocery cart; energy conservation; spoon method; wrist weights      Assessment   Hernandez was referred to outpatient neurological occupational therapy and presents with a medical diagnosis of MS. SLUM (found in media) score indicates mild neurocognitive disorder. Continue to recommend outpatient OT referral for driving evaluation to evaluate overall safety on the road (due to visual and cognitive impairment) and modifications and adaptations.     Hernandez is progressing well towards his goals and there are no updates to goals at this time. Pt prognosis is Good.     Pt will continue to benefit from skilled outpatient occupational therapy to address the deficits listed in the problem list on initial evaluation provide pt/family education and to maximize pt's level of independence in the home and community environment.     Anticipated barriers to occupational therapy: insight     Pt's spiritual, cultural and educational needs considered and pt agreeable to plan of care and goals.    Goals:  Long Term Goals:  Time frame: 8 weeks  - Pt will decrease time on 9HPT by 15 seconds right hand to improve fine motor speed and coordination needed to perform bimanual daily tasks and activities. Ongoing   - Pt will complete Box and Blocks Test with PING, transferring 28 blocks, to demonstrate improved gross manual coordination needed for ADL and IADL tasks. Ongoing   - Pt will be independent with Home Exercise Program (HEP)/Home Activity Program (HAP) to promote long term maintenance of progress made in therapy. Ongoing   -  Pt will demonstrate consistent use of compensatory strategies/adaptive equipment during bimanual/fine motor tasks to maximize UE control. Ongoing   - Pt will improve FOTO limitation score to less than or equal to 38% for improved self perception of functional performance with daily activities. Ongoing      Short Term Goals:  Time frame: 4 weeks  - Pt will decrease time on 9HPT by 10 seconds right hand to improve fine motor speed and coordination needed to perform bimanual daily tasks and activities. Ongoing   - Pt will demonstrate knowledge and use of energy conservation and work simplification strategies during ADL and IADL tasks. Ongoing   - Pt will demonstrate consistent use of 3 point stabilization technique during bimanual/fine motor tasks to maximize UE control. Ongoing   - Pt will be independent with Home Exercise Program (HEP)/Home Activity Program (HAP) and report at least 50% compliance. Ongoing      Goals to be adjusted and added within plan of care as appropriate.      The following goals were discussed with the patient and patient is in agreement with them as to be addressed in the treatment plan.     Plan   Certification Period/Plan of care expiration: 10/14/2022 to 12/9/2022.     Outpatient Occupational Therapy 2 times weekly for 8 weeks to include the following interventions: Neuromuscular Re-ed, Patient Education, Self Care, Therapeutic Activities, and Therapeutic Exercise.     Updates/Grading for next session: SLUMS; weight bearing       LIANE Medina, Neuro MELISSA Certified   Neuro Occupational Therapist   [facility] - [facility]   11/3/2022

## 2022-11-04 ENCOUNTER — CLINICAL SUPPORT (OUTPATIENT)
Dept: REHABILITATION | Facility: HOSPITAL | Age: 62
End: 2022-11-04
Attending: STUDENT IN AN ORGANIZED HEALTH CARE EDUCATION/TRAINING PROGRAM
Payer: MEDICARE

## 2022-11-04 DIAGNOSIS — R26.89 IMBALANCE: ICD-10-CM

## 2022-11-04 DIAGNOSIS — R26.9 GAIT ABNORMALITY: Primary | ICD-10-CM

## 2022-11-04 DIAGNOSIS — R27.8 COORDINATION IMPAIRMENTS: Primary | ICD-10-CM

## 2022-11-04 PROCEDURE — 97530 THERAPEUTIC ACTIVITIES: CPT | Mod: PO

## 2022-11-04 PROCEDURE — 97110 THERAPEUTIC EXERCISES: CPT | Mod: PO,CQ

## 2022-11-04 PROCEDURE — 97110 THERAPEUTIC EXERCISES: CPT | Mod: PO

## 2022-11-04 PROCEDURE — 97116 GAIT TRAINING THERAPY: CPT | Mod: PO,CQ

## 2022-11-04 NOTE — PROGRESS NOTES
"  [facility]   Physical Therapy Treatment Note     Name: Hernandez Hernandez  Clinic Number: [id number]    Therapy Diagnosis:   Encounter Diagnoses   Name Primary?    Gait abnormality Yes    Imbalance          Physician: Danica Anne MD    Visit Date: 11/4/2022    Physician Orders: PT Eval and Treat   Medical Diagnosis from Referral:   G35 (ICD-10-CM) - Multiple sclerosis   R25.1 (ICD-10-CM) - Tremor   Evaluation Date: 10/13/2022  Authorization Period Expiration: 12/31/22  Plan of Care Expiration: 01/05/23  Plan of Care Certification Period: 10/13/22 - 01/05/23   Visit # / Visits authorized: 4/12 (+ initial eval)    PTA Visit #: 2/5     Time In: 13:00  Time Out: 13:45  Total Billable Time: 45 minutes    Precautions: Fall, Standard    SUBJECTIVE     Pt reports: " I'm doing pretty good."   He will be provided with home exercise program at today's visit.  Response to previous treatment: no adverse effects  Functional change: ongoing    Pain: 0/10  Location: N/A     OBJECTIVE     Objective Measures updated at progress report unless specified.     Treatment     Hernandez received the treatments listed below:      therapeutic exercises to develop strength, endurance, ROM, flexibility, posture, and core stabilization for 30 minutes including:  X 5 min on Sci Fit recumbent stepper.  B UE/ B LE on level 1.0  1 x 10 reps of sit to stand from black mat with 1 UE support to no UE support.  VC;s for correct hand placement    // bars:   2 x 10 reps of B LE heel/toe raises with 2 UE support  2 x 10 reps of B LE hip flexion with 2 UE support  2 x 10 reps of B LE hip abd/add with 2 UE support.  VCs for upright posture, extended knee and form.      Seated: R plantarflexion/dorsiflexion on fitter 2 minutes with 3# weight under forefoot   R LE HS curls with creeper and YTB,  VCs     1 x 5 reps of R LE single leg step up and step down with therapist preventing hyperextension.  2 UE support    therapeutic activities to " improve functional performance for 0 minutes, including:    neuromuscular re-education activities to improve: Balance, Coordination, Kinesthetic, Sense, Proprioception, and Posture for 0 minutes. The following activities were included:      gait training to improve functional mobility and safety for 15 minutes, including: With knee cage and carbon fiber AFO    400 feet ambulating with RW     Time spent escorting patient out the clinic from the gym, down the hallway, down the elevator and out the building to his car.  VC's for his safety       Patient Education and Home Exercises     Home Exercises Provided and Patient Education Provided     Education provided:   - 10/25/22: initial HEP provided with hooklying clams with green theraband and supine bridges    Written Home Exercises Provided: yes. Exercises were reviewed and Hernandez was able to demonstrate them prior to the end of the session.  Hernandez demonstrated good  understanding of the education provided. See EMR under Patient Instructions for exercises provided during therapy sessions    ASSESSMENT     Hernandez tolerated his tx session fairly well and reports some compliance with HEP.  Hernandez began cardiovascular endurance on the stepper and cont with strengthening exercises.  Hernandez required maximum cues for safety throughout the tx session and commented often on how he is strong, and the exercises wont' help him.  Hernandez focused on ankle DF and hamstring strengthening with the creeper.  Hernandez required cues for correct hand placement for transfers as well as cues for safety through out the tx session.   Continue with POC.     Hernandez Is progressing well towards his goals.   Pt prognosis is Good.     Pt will continue to benefit from skilled outpatient physical therapy to address the deficits listed in the problem list box on initial evaluation, provide pt/family education and to maximize pt's level of independence in the home and community environment.     Pt's spiritual, cultural  and educational needs considered and pt agreeable to plan of care and goals.     Anticipated barriers to physical therapy: progressive nature of condition    Goals:  Short Term Goals: 4 weeks  Pt to be introduced to HEP and report > 50% compliance.  Pt to improve gross BLE strength by > 1/3 muscle grade on MMT.  Pt to improve TUG score to 13 sec with SPV and appropriate orthoses and/or AD to decrease risk of falls.  Pt to improve 30 sec chair rise score to 12.5 reps to show improved functional lower extremity endurance.  Pt to improve condition 3 on the GST to 5 seconds for improved balance in low vision environments with NBOS.   Pt to improve condition 6 on the GST to 8 seconds for improved balance in low vision environments on unstable surface.      Long Term Goals: 8 weeks   Pt to improve R ankle flexibility to WNL to improve fitting of R AFO.  Pt to improve gross B hip strength by > 2/3 muscle grade on MMT.  Pt to improve TUG score to 12 sec with SPV and appropriate orthoses and/or AD to decrease risk of falls.  Pt to improve 30 sec chair rise score to 12.5 reps to show improved functional lower extremity endurance.  Pt to improve condition 3 on the GST to 10 seconds for improved balance in low vision environments with NBOS.   Pt to improve condition 6 on the GST to 15 seconds for improved balance in low vision environments on unstable surface.     PLAN     Initiate balance training next visit.     Domonique Arias, PTA

## 2022-11-04 NOTE — PROGRESS NOTES
[facility]   Occupational Therapy Neurological Rehabilitation   Treatment Note   Name: Hernandez Hernandez  MRN: [id number]  Today's Date: 11/7/2022    Therapy Diagnosis:   Encounter Diagnosis   Name Primary?    Coordination impairments Yes       Physician: Danica Anne MD  Physician Orders: Neuro Program      Medical Diagnosis: G35 (ICD-10-CM) - Multiple sclerosis R25.1 (ICD-10-CM) - Tremor   Recent Surgery: * No surgery found *  Evaluation Date: 10/14/2022  Plan of Care Expiration Period: 12/9/2022  Insurance Authorization period Expiration: 12/31/2022  Date of Return to MD: 10/31: ophamology; MRI 11/1/22; 12/16/2022 Neurology   FOTO: 1/3    Visit # / Visits Authorized: 4 / 12 (+evaluation)  Time In: 11:00  Time Out: 11:45  Total Billable Time: 45 minutes    Precautions:   Standard and Fall    Subjective   Pt reports: had a big weekend - had support from friends while out and about     Response to previous treatment: no complaints  Functional change: ongoing ; in an MS support group on [employer]  Patient's Goals for Occupational Therapy: to learn new ways to fine tune his functional indep and safety     Pain upon arrival: 0/10  Location:n/a  Pain at cessation of session: 0/10    Objective     Hernandez received therapeutic exercises for 30 minutes including:  Supine and seated edge of mat for stretching protocol for bilateral upper extremities and LE - including core with demo understanding x8 reps each  -shoulder flex, abduction, internal and external rotation; trunk and hip    Hernandez participated in dynamic functional therapeutic activities to improve functional performance for 15  minutes, including:  -discussed stress managment and importance of activity routine       Handoff to PT at cessation of session     Home Exercises and Education Provided   Education provided:   - use of cart at [employer] to bring up groceries in 1 trip from car   - edu on spoon method for energy conservation   - points of stability  for tremor managment   - wrist weights for proprioceptive input   - role of Occupational Therapy in care, goals for Occupational Therapy for this plan of care, scheduling   - Progress towards goals     Written Home Exercises Provided:  none  .    See EMR under Patient Instructions for exercises provided 10/27/2022: Wire grocery cart; energy conservation; spoon method; wrist weights   11/7: MS Society Stretching Program -- pages 11-13; 18-20     Assessment   Hernandez demo good understanding for stretching protocol for MS. He was more open to education for stress managment with moderate resistance. He will continue to benefit from OT services to maximize his functional outcomes and safety.     Hernandez is progressing well towards his goals and there are no updates to goals at this time. Pt prognosis is Good.     Pt will continue to benefit from skilled outpatient occupational therapy to address the deficits listed in the problem list on initial evaluation provide pt/family education and to maximize pt's level of independence in the home and community environment.     Anticipated barriers to occupational therapy: insight     Pt's spiritual, cultural and educational needs considered and pt agreeable to plan of care and goals.    Goals:  Long Term Goals:  Time frame: 8 weeks  - Pt will decrease time on 9HPT by 15 seconds right hand to improve fine motor speed and coordination needed to perform bimanual daily tasks and activities. Ongoing   - Pt will complete Box and Blocks Test with PING, transferring 28 blocks, to demonstrate improved gross manual coordination needed for ADL and IADL tasks. Ongoing   - Pt will be independent with Home Exercise Program (HEP)/Home Activity Program (HAP) to promote long term maintenance of progress made in therapy. Ongoing   - Pt will demonstrate consistent use of compensatory strategies/adaptive equipment during bimanual/fine motor tasks to maximize UE control. Ongoing   - Pt will improve FOTO  limitation score to less than or equal to 38% for improved self perception of functional performance with daily activities. Ongoing      Short Term Goals:  Time frame: 4 weeks  - Pt will decrease time on 9HPT by 10 seconds right hand to improve fine motor speed and coordination needed to perform bimanual daily tasks and activities. Ongoing   - Pt will demonstrate knowledge and use of energy conservation and work simplification strategies during ADL and IADL tasks. Ongoing   - Pt will demonstrate consistent use of 3 point stabilization technique during bimanual/fine motor tasks to maximize UE control. Ongoing   - Pt will be independent with Home Exercise Program (HEP)/Home Activity Program (HAP) and report at least 50% compliance. Ongoing      Goals to be adjusted and added within plan of care as appropriate.      The following goals were discussed with the patient and patient is in agreement with them as to be addressed in the treatment plan.     Plan   Certification Period/Plan of care expiration: 10/14/2022 to 12/9/2022.     Outpatient Occupational Therapy 2 times weekly for 8 weeks to include the following interventions: Neuromuscular Re-ed, Patient Education, Self Care, Therapeutic Activities, and Therapeutic Exercise.     Updates/Grading for next session: SLUMS; weight bearing       LIANE Medina, Neuro MELISSA Certified   Neuro Occupational Therapist   [facility] - [facility]   11/7/2022

## 2022-11-07 ENCOUNTER — CLINICAL SUPPORT (OUTPATIENT)
Dept: REHABILITATION | Facility: HOSPITAL | Age: 62
End: 2022-11-07
Attending: FAMILY MEDICINE
Payer: MEDICARE

## 2022-11-07 DIAGNOSIS — R27.8 COORDINATION IMPAIRMENTS: Primary | ICD-10-CM

## 2022-11-07 DIAGNOSIS — R26.9 GAIT ABNORMALITY: Primary | ICD-10-CM

## 2022-11-07 DIAGNOSIS — R26.89 IMBALANCE: ICD-10-CM

## 2022-11-07 PROCEDURE — 97530 THERAPEUTIC ACTIVITIES: CPT | Mod: PO

## 2022-11-07 PROCEDURE — 97110 THERAPEUTIC EXERCISES: CPT | Mod: PO

## 2022-11-07 PROCEDURE — 97116 GAIT TRAINING THERAPY: CPT | Mod: PO

## 2022-11-07 NOTE — PROGRESS NOTES
[facility]   Physical Therapy Treatment Note     Name: Hernandez Hernandez  Clinic Number: [id number]    Therapy Diagnosis:   Encounter Diagnoses   Name Primary?    Gait abnormality Yes    Imbalance        Physician: Danica Anne MD    Visit Date: 11/7/2022    Physician Orders: PT Eval and Treat   Medical Diagnosis from Referral:   G35 (ICD-10-CM) - Multiple sclerosis   R25.1 (ICD-10-CM) - Tremor   Evaluation Date: 10/13/2022  Authorization Period Expiration: 12/31/22  Plan of Care Expiration: 01/05/23  Plan of Care Certification Period: 10/13/22 - 01/05/23   Visit # / Visits authorized: 4/12 (+ initial eval)    PTA Visit #: 0/5     Time In: 1145  Time Out: 1225  Total Billable Time: 40 minutes    Precautions: Fall, Standard    SUBJECTIVE     Pt reports: nothing new to report  He is semi-compliant with HEP.  Response to previous treatment: no adverse effects  Functional change: ongoing    Pain: 0/10  Location: N/A     OBJECTIVE     Objective Measures updated at progress report unless specified.     Treatment     Hernandez received the treatments listed below:      therapeutic exercises to develop strength, endurance, ROM, flexibility, posture, and core stabilization for 25 minutes including:    2 x 10, 3 sec hold R ankle dorsiflexion in supine  - decreased performance speed    2 x 10 each side prone knee flexion - added 1.5# ankle weight to L ankle, active-assisted for R   - decreased performance speed    8 minutes on [employer] recumbent stepper.  B UE/B LE on level 1.0      therapeutic activities to improve functional performance for 0 minutes, including:    neuromuscular re-education activities to improve: Balance, Coordination, Kinesthetic, Sense, Proprioception, and Posture for 0 minutes. The following activities were included:      gait training to improve functional mobility and safety for 15 minutes, including: With R knee cage     350 total feet ambulating with RW     Time spent escorting  patient out the clinic from the gym, down the hallway, down the elevator and out the building to his car.  VC's for his safety       Patient Education and Home Exercises     Home Exercises Provided and Patient Education Provided     Education provided:   - 10/25/22: initial HEP provided with hooklying clams with green theraband and supine bridges  - 11/7/22: added prone knee flexion,     Written Home Exercises Provided: yes. Exercises were reviewed and Hernandez was able to demonstrate them prior to the end of the session.  Hernandez demonstrated good  understanding of the education provided. See EMR under Patient Instructions for exercises provided during therapy sessions    ASSESSMENT     Hernandez tolerated his tx session well today. Some time spent adjusting large knee cage prior to ambulating. More exercises performed today to add to HEP and assist with stabilizing his R knee and ankle. Continue with POC.     Hernandez Is progressing well towards his goals.   Pt prognosis is Good.     Pt will continue to benefit from skilled outpatient physical therapy to address the deficits listed in the problem list box on initial evaluation, provide pt/family education and to maximize pt's level of independence in the home and community environment.     Pt's spiritual, cultural and educational needs considered and pt agreeable to plan of care and goals.     Anticipated barriers to physical therapy: progressive nature of condition    Goals:  Short Term Goals: 4 weeks  Pt to be introduced to HEP and report > 50% compliance.  Pt to improve gross BLE strength by > 1/3 muscle grade on MMT.  Pt to improve TUG score to 13 sec with SPV and appropriate orthoses and/or AD to decrease risk of falls.  Pt to improve 30 sec chair rise score to 12.5 reps to show improved functional lower extremity endurance.  Pt to improve condition 3 on the GST to 5 seconds for improved balance in low vision environments with NBOS.   Pt to improve condition 6 on the GST to  8 seconds for improved balance in low vision environments on unstable surface.      Long Term Goals: 8 weeks   Pt to improve R ankle flexibility to WNL to improve fitting of R AFO.  Pt to improve gross B hip strength by > 2/3 muscle grade on MMT.  Pt to improve TUG score to 12 sec with SPV and appropriate orthoses and/or AD to decrease risk of falls.  Pt to improve 30 sec chair rise score to 12.5 reps to show improved functional lower extremity endurance.  Pt to improve condition 3 on the GST to 10 seconds for improved balance in low vision environments with NBOS.   Pt to improve condition 6 on the GST to 15 seconds for improved balance in low vision environments on unstable surface.     PLAN     Continue with POC.    Shayne Anaya, PT

## 2022-11-07 NOTE — PATIENT INSTRUCTIONS
[email or web address]

## 2022-11-10 NOTE — PROGRESS NOTES
"  [facility]   Occupational Therapy Neurological Rehabilitation   Treatment Note   Name: Hernandez Hernandez  MRN: [id number]  Today's Date: 11/11/2022    Therapy Diagnosis:   Encounter Diagnosis   Name Primary?    Coordination impairments Yes     Physician: Danica Anne MD  Physician Orders: Neuro Program      Medical Diagnosis: G35 (ICD-10-CM) - Multiple sclerosis R25.1 (ICD-10-CM) - Tremor   Recent Surgery: * No surgery found *  Evaluation Date: 10/14/2022  Plan of Care Expiration Period: 12/9/2022  Insurance Authorization period Expiration: 12/31/2022  Date of Return to MD: 10/31: ophamology; MRI 11/1/22; 12/16/2022 Neurology   FOTO: 1/3    Visit # / Visits Authorized: 5 / 12 (+evaluation)  Time In: 12:15  Time Out: 1:00  Total Billable Time: 45 minutes    Precautions:   Standard and Fall    Subjective   Pt reports: "exercises are not high on my priority"  Not compliant with HEP at this time  Response to previous treatment: no complaints  Functional change: ongoing ; in an MS support group on [employer]  Patient's Goals for Occupational Therapy: to learn new ways to fine tune his functional indep and safety     Pain upon arrival: 0/10  Location:n/a  Pain at cessation of session: 0/10    Objective   Received from PT session     Hernandez received therapeutic exercises for 10 minutes including:  -upper body ergonometer on level 2.5; completed for 10 minutes switching directions mid way. Focus on postural control and breathing techniques with exhale with exertion of force. MET average 2.0     Hernandez participated in dynamic functional therapeutic activities to improve functional performance for 35 minutes, including:  -edu on tremor managment ; Readi-Steadi glove for tremor managment <>watched video of person using it for managment  of tremor with functional task   -1.5# wrist weights<>transfer of 7 glass stones from one bowl into another bowl with palm<>2 point translation   -re edu on points of stability for " right upper extremity     Functional mobility to/from waiting area with RW and contact guard assist       Home Exercises and Education Provided   Education provided:   - Readi-Steadi glove for tremor managment    - use of cart at [employer] to bring up groceries in 1 trip from car   - edu on spoon method for energy conservation   - points of stability for tremor managment   - wrist weights for proprioceptive input   - role of Occupational Therapy in care, goals for Occupational Therapy for this plan of care, scheduling   - Progress towards goals     Written Home Exercises Provided:  none  .    See EMR under Patient Instructions for exercises provided 10/27/2022: Wire grocery cart; energy conservation; spoon method; wrist weights   11/7: MS Society Stretching Program -- pages 11-13; 18-20  11/11: research article for weighted gloves to aid in tremor managment  and reduction      Assessment   Hernandez was provided today with ongoing education for tremor managment and control- he continues to demo resistance at this time. He demo improvement with points of stability and wrist weight for fine motor task with right hand today with ability to translate 7 items from palm to finger tips with 0 drops x5 reps.    Hernandez is progressing well towards his goals and there are no updates to goals at this time. Pt prognosis is Good.     Pt will continue to benefit from skilled outpatient occupational therapy to address the deficits listed in the problem list on initial evaluation provide pt/family education and to maximize pt's level of independence in the home and community environment.     Anticipated barriers to occupational therapy: insight ; limited carry over     Pt's spiritual, cultural and educational needs considered and pt agreeable to plan of care and goals.    Goals:  Long Term Goals:  Time frame: 8 weeks  - Pt will decrease time on 9HPT by 15 seconds right hand to improve fine motor speed and coordination needed to perform  bimanual daily tasks and activities. Ongoing   - Pt will complete Box and Blocks Test with PING, transferring 28 blocks, to demonstrate improved gross manual coordination needed for ADL and IADL tasks. Ongoing   - Pt will be independent with Home Exercise Program (HEP)/Home Activity Program (HAP) to promote long term maintenance of progress made in therapy. Ongoing   - Pt will demonstrate consistent use of compensatory strategies/adaptive equipment during bimanual/fine motor tasks to maximize UE control. Ongoing   - Pt will improve FOTO limitation score to less than or equal to 38% for improved self perception of functional performance with daily activities. Ongoing      Short Term Goals:  Time frame: 4 weeks  - Pt will decrease time on 9HPT by 10 seconds right hand to improve fine motor speed and coordination needed to perform bimanual daily tasks and activities. Ongoing   - Pt will demonstrate knowledge and use of energy conservation and work simplification strategies during ADL and IADL tasks. Ongoing   - Pt will demonstrate consistent use of 3 point stabilization technique during bimanual/fine motor tasks to maximize UE control. Ongoing   - Pt will be independent with Home Exercise Program (HEP)/Home Activity Program (HAP) and report at least 50% compliance. Ongoing      Goals to be adjusted and added within plan of care as appropriate.      The following goals were discussed with the patient and patient is in agreement with them as to be addressed in the treatment plan.     Plan   Certification Period/Plan of care expiration: 10/14/2022 to 12/9/2022.     Outpatient Occupational Therapy 2 times weekly for 8 weeks to include the following interventions: Neuromuscular Re-ed, Patient Education, Self Care, Therapeutic Activities, and Therapeutic Exercise.     Updates/Grading for next session: education for safety for ADLs/home managment /IADLs      LIANE Medina, Neuro MELISSA Certified   Neuro Occupational  Therapist   [facility]   11/11/2022

## 2022-11-11 ENCOUNTER — CLINICAL SUPPORT (OUTPATIENT)
Dept: REHABILITATION | Facility: HOSPITAL | Age: 62
End: 2022-11-11
Attending: FAMILY MEDICINE
Payer: MEDICARE

## 2022-11-11 DIAGNOSIS — R26.9 GAIT ABNORMALITY: Primary | ICD-10-CM

## 2022-11-11 DIAGNOSIS — R27.8 COORDINATION IMPAIRMENTS: Primary | ICD-10-CM

## 2022-11-11 DIAGNOSIS — R26.89 IMBALANCE: ICD-10-CM

## 2022-11-11 PROCEDURE — 97110 THERAPEUTIC EXERCISES: CPT | Mod: PO

## 2022-11-11 PROCEDURE — 97116 GAIT TRAINING THERAPY: CPT | Mod: PO

## 2022-11-11 PROCEDURE — 97530 THERAPEUTIC ACTIVITIES: CPT | Mod: PO

## 2022-11-11 NOTE — PROGRESS NOTES
[facility]   Physical Therapy Treatment Note     Name: Hernandez Hernandez  Clinic Number: [id number]    Therapy Diagnosis:   Encounter Diagnoses   Name Primary?    Gait abnormality Yes    Imbalance      Physician: Danica Anne MD    Visit Date: 11/11/2022    Physician Orders: PT Eval and Treat   Medical Diagnosis from Referral:   G35 (ICD-10-CM) - Multiple sclerosis   R25.1 (ICD-10-CM) - Tremor   Evaluation Date: 10/13/2022  Authorization Period Expiration: 12/31/22  Plan of Care Expiration: 01/05/23  Plan of Care Certification Period: 10/13/22 - 01/05/23   Visit # / Visits authorized: 5/12 (+ initial eval)    PTA Visit #: 0/5     Time In: 1130  Time Out: 1210  Total Billable Time: 40 minutes    Precautions: Fall, Standard    SUBJECTIVE     Pt reports: nothing new to report  He is not compliant with HEP.  Response to previous treatment: no adverse effects  Functional change: ongoing    Pain: 0/10  Location: N/A     OBJECTIVE     Objective Measures updated at progress report unless specified.     Treatment     Hernandez received the treatments listed below:      therapeutic exercises to develop strength, endurance, ROM, flexibility, posture, and core stabilization for 17 minutes including:    1 x 10, 3 sec hold R ankle dorsiflexion in supine  - decreased performance speed    2 x 10 each side prone knee flexion - 1.5# ankle weight to L ankle, active-assisted for R intermittently  - decreased performance speed    3 x 30 sec B calf stretch on incline      therapeutic activities to improve functional performance for 0 minutes, including:    neuromuscular re-education activities to improve: Balance, Coordination, Kinesthetic, Sense, Proprioception, and Posture for 0 minutes. The following activities were included:    gait training to improve functional mobility and safety for 23 minutes, including: With R knee cage     200 total feet ambulating with RW with R knee cage, R AFO; CGA-SBA    150 total  feet ambulating with R knee cage, R AFO; CGA    Time above includes time for pt education regarding safety concerns with falls, the progressive nature of his dx, and the benefits of equipment (knee cage, AFO, AD).        Patient Education and Home Exercises     Home Exercises Provided and Patient Education Provided     Education provided:   - 10/25/22: initial HEP provided with hooklying clams with green theraband and supine bridges  - 11/7/22: added prone knee flexion, ankle DF    Written Home Exercises Provided: yes. Exercises were reviewed and Hernandez was able to demonstrate them prior to the end of the session.  Hernandez demonstrated good  understanding of the education provided. See EMR under Patient Instructions for exercises provided during therapy sessions    ASSESSMENT     Hernandez tolerated his tx session fairly well today. Pt is non-compliant with HEP and is limited by hesitancy in PT equipment recommendations for AFO and knee cage. Pt demonstrates fairly good walking balance without RW with AFO and knee cage donned. Reassess next visit. Continue with POC.     Hernandez Is progressing well towards his goals.   Pt prognosis is Good.     Pt will continue to benefit from skilled outpatient physical therapy to address the deficits listed in the problem list box on initial evaluation, provide pt/family education and to maximize pt's level of independence in the home and community environment.     Pt's spiritual, cultural and educational needs considered and pt agreeable to plan of care and goals.     Anticipated barriers to physical therapy: progressive nature of condition    Goals:  Short Term Goals: 4 weeks  Pt to be introduced to HEP and report > 50% compliance.  Pt to improve gross BLE strength by > 1/3 muscle grade on MMT.  Pt to improve TUG score to 13 sec with SPV and appropriate orthoses and/or AD to decrease risk of falls.  Pt to improve 30 sec chair rise score to 12.5 reps to show improved functional lower extremity  endurance.  Pt to improve condition 3 on the GST to 5 seconds for improved balance in low vision environments with NBOS.   Pt to improve condition 6 on the GST to 8 seconds for improved balance in low vision environments on unstable surface.      Long Term Goals: 8 weeks   Pt to improve R ankle flexibility to WNL to improve fitting of R AFO.  Pt to improve gross B hip strength by > 2/3 muscle grade on MMT.  Pt to improve TUG score to 12 sec with SPV and appropriate orthoses and/or AD to decrease risk of falls.  Pt to improve 30 sec chair rise score to 12.5 reps to show improved functional lower extremity endurance.  Pt to improve condition 3 on the GST to 10 seconds for improved balance in low vision environments with NBOS.   Pt to improve condition 6 on the GST to 15 seconds for improved balance in low vision environments on unstable surface.     PLAN     Continue with POC.    Shayne Anaya, PT

## 2022-11-14 ENCOUNTER — CLINICAL SUPPORT (OUTPATIENT)
Dept: REHABILITATION | Facility: HOSPITAL | Age: 62
End: 2022-11-14
Attending: STUDENT IN AN ORGANIZED HEALTH CARE EDUCATION/TRAINING PROGRAM
Payer: MEDICARE

## 2022-11-14 ENCOUNTER — CLINICAL SUPPORT (OUTPATIENT)
Dept: REHABILITATION | Facility: HOSPITAL | Age: 62
End: 2022-11-14
Attending: FAMILY MEDICINE
Payer: MEDICARE

## 2022-11-14 DIAGNOSIS — R26.89 IMBALANCE: ICD-10-CM

## 2022-11-14 DIAGNOSIS — R26.9 GAIT ABNORMALITY: Primary | ICD-10-CM

## 2022-11-14 DIAGNOSIS — R27.8 COORDINATION IMPAIRMENTS: Primary | ICD-10-CM

## 2022-11-14 PROCEDURE — 97110 THERAPEUTIC EXERCISES: CPT | Mod: PO

## 2022-11-14 PROCEDURE — 97530 THERAPEUTIC ACTIVITIES: CPT | Mod: PO

## 2022-11-14 NOTE — PROGRESS NOTES
[facility]   Occupational Therapy Neurological Rehabilitation   Treatment Note   Name: Hernandez Hernandez  MRN: [id number]  Today's Date: 11/14/2022    Therapy Diagnosis:   Encounter Diagnosis   Name Primary?    Coordination impairments Yes     Physician: Danica Anne MD  Physician Orders: Neuro Program      Medical Diagnosis: G35 (ICD-10-CM) - Multiple sclerosis R25.1 (ICD-10-CM) - Tremor   Recent Surgery: * No surgery found *  Evaluation Date: 10/14/2022  Plan of Care Expiration Period: 12/9/2022  Insurance Authorization period Expiration: 12/31/2022  Date of Return to MD: 10/31: ophamology; MRI 11/1/22; 12/16/2022 Neurology   FOTO: 2/3    Visit # / Visits Authorized: 6 / 12 (+evaluation)  Time In: 11:00  Time Out: 11:45  Total Billable Time: 45 minutes    Precautions:   Standard and Fall    Subjective   Pt reports: bought a 2.5# wrist weight for right arm over the weekend   Not compliant with HEP at this time  Response to previous treatment: no complaints  Functional change: ongoing ; in an MS support group on [address]  Patient's Goals for Occupational Therapy: to learn new ways to fine tune his functional indep and safety     Pain upon arrival: 0/10  Location:n/a  Pain at cessation of session: 0/10    Objective     Hernandez received therapeutic exercises for 10 minutes including:  -upper body ergonometer on level 2.5; completed for 10 minutes switching directions mid way. Focus on postural control and breathing techniques with exhale with exertion of force. MET average 2.4 (2.5# weight to right wrist)    Hernandez participated in dynamic functional therapeutic activities to improve functional performance for 35 minutes, including:  Seated at table top:   -iPad completion of FOTO with left digit isolation   -2.5# right wrist weight for fine motor task completion -- blue restive clothes pin for picking up medium and large poms for placement in slot board x24 reps  -right 2 point pinch for picking up small  poms x24 reps (Remains with wrist weight)  -right 2 and 3 point pinch- translation of glass beads (5-7) into container     While on ergonometer- discussed safety in bathroom and home environment     Functional mobility to/from waiting area with RW and contact guard assist       Home Exercises and Education Provided   Education provided:   - Readi-Steadi glove for tremor managment  - information for evaluation and implementation   - use of cart at condo to bring up groceries in 1 trip from car   - edu on spoon method for energy conservation   - points of stability for tremor managment   - wrist weights for proprioceptive input   - role of Occupational Therapy in care, goals for Occupational Therapy for this plan of care, scheduling   - Progress towards goals     Written Home Exercises Provided:  none  .    See EMR under Patient Instructions for exercises provided 10/27/2022: Wire grocery cart; energy conservation; spoon method; wrist weights   11/7: MS Society Stretching Program -- pages 11-13; 18-20  11/11: research article for weighted gloves to aid in tremor managment  and reduction      Assessment   Hernandez tolerated session well today; requiring mod(A) for re edu on points of stability for right upper extremity tremor management. He continues to demo moderate difficulty with right upper extremity (dominant) with tasks; however, is able to utilize left upper extremity as assist when needed. He demo openness to education on this date and has purchased a wrist weight for home use at this time (although, has not used yet).     Hernandez is progressing well towards his goals and there are no updates to goals at this time. Pt prognosis is Good.     Pt will continue to benefit from skilled outpatient occupational therapy to address the deficits listed in the problem list on initial evaluation provide pt/family education and to maximize pt's level of independence in the home and community environment.     Anticipated barriers  to occupational therapy: insight ; limited carry over     Pt's spiritual, cultural and educational needs considered and pt agreeable to plan of care and goals.    Goals:  Long Term Goals:  Time frame: 8 weeks  - Pt will decrease time on 9HPT by 15 seconds right hand to improve fine motor speed and coordination needed to perform bimanual daily tasks and activities. Ongoing   - Pt will complete Box and Blocks Test with RUE, transferring 28 blocks, to demonstrate improved gross manual coordination needed for ADL and IADL tasks. Ongoing   - Pt will be independent with Home Exercise Program (HEP)/Home Activity Program (HAP) to promote long term maintenance of progress made in therapy. Ongoing   - Pt will demonstrate consistent use of compensatory strategies/adaptive equipment during bimanual/fine motor tasks to maximize UE control. Ongoing   - Pt will improve FOTO limitation score to less than or equal to 38% for improved self perception of functional performance with daily activities. Not met; 48% on 11/14     Short Term Goals:  Time frame: 4 weeks  - Pt will decrease time on 9HPT by 10 seconds right hand to improve fine motor speed and coordination needed to perform bimanual daily tasks and activities. Ongoing   - Pt will demonstrate knowledge and use of energy conservation and work simplification strategies during ADL and IADL tasks. Ongoing   - Pt will demonstrate consistent use of 3 point stabilization technique during bimanual/fine motor tasks to maximize UE control. Ongoing   - Pt will be independent with Home Exercise Program (HEP)/Home Activity Program (HAP) and report at least 50% compliance. Ongoing      Goals to be adjusted and added within plan of care as appropriate.      The following goals were discussed with the patient and patient is in agreement with them as to be addressed in the treatment plan.     Plan   Certification Period/Plan of care expiration: 10/14/2022 to 12/9/2022.     Outpatient  Occupational Therapy 2 times weekly for 8 weeks to include the following interventions: Neuromuscular Re-ed, Patient Education, Self Care, Therapeutic Activities, and Therapeutic Exercise.     Updates/Grading for next session: education for safety for ADLs/home managment /IADLs      LIANE Medina, Neuro MELISSA Certified   Neuro Occupational Therapist   [facility] - [facility]   11/14/2022

## 2022-11-14 NOTE — PROGRESS NOTES
" [facility]   Physical Therapy Treatment Note     Name: Hernandez Hernandez  Clinic Number: [id number]    Therapy Diagnosis:   Encounter Diagnoses   Name Primary?    Gait abnormality Yes    Imbalance        Physician: Danica Anne MD    Visit Date: 11/14/2022    Physician Orders: PT Eval and Treat   Medical Diagnosis from Referral:   G35 (ICD-10-CM) - Multiple sclerosis   R25.1 (ICD-10-CM) - Tremor   Evaluation Date: 10/13/2022  Authorization Period Expiration: 12/31/22  Plan of Care Expiration: 01/05/23  Plan of Care Certification Period: 10/13/22 - 01/05/23   Visit # / Visits authorized: 6/12 (+ initial eval)    PTA Visit #: 0/5     Time In: 1148  Time Out: 1228  Total Billable Time: 40 minutes    Precautions: Fall, Standard    SUBJECTIVE     Pt reports: nothing new to report  He is not compliant with HEP.  Response to previous treatment: no adverse effects  Functional change: ongoing    Pain: 0/10  Location: N/A     OBJECTIVE        Strength: MMT grades below:      Lower Extremity Strength - true positions, some motor impersistence  Right LE  Eval 10/13/2022 11/14/22 Left LE Eval 10/13/2022 11/14/22   Hip Flexion: 4-/5 4/5 Hip Flexion: 5/5 5/5   Hip Extension:  4/5 4/5 Hip Extension: 4-/5 4/5   Hip Abduction: 2+/5 4-/5 Hip Abduction: 4/5 4+/5   Hip Adduction: 4/5 4/5 Hip Adduction 5/5 5/5   Knee Extension: 5/5 5/5 Knee Extension: 5/5 5/5   Knee Flexion: 3+/5 4/5 Knee Flexion: 5/5 5/5   Ankle Dorsiflexion: 2-/5 (limited range in gravity eliminated) 2-/5 Ankle Dorsiflexion: 5/5 5/5   Ankle Plantarflexion: 4/5 5/5 Ankle Plantarflexion: 5/5 5/5      Flexibility at eval: impaired minimally at R ankle plantarflexors     Flexibility 11/14/22: WNL at R ankle plantarflexors      HECTOR SENSORY TESTING:  (P= Pass, F= Fail; note any sway; hold each position for 30")  Condition 1: (firm surface/Romberg/EO) P   Condition 2: (firm surface/Romberg/EC) P min sway  Condition 3: (firm surface/tandem/EO) F 2 " "sec  Condition 4: (firm surface/tandem/EC) NT  Condition 5: (soft surface/Romberg/EO) F 3 sec  Condition 6: (soft surface/Romberg/EC) NT    HECTOR SENSORY TESTING 11/14/22 with R AFO and R knee cage:  (P= Pass, F= Fail; note any sway; hold each position for 30")  Condition 1: (firm surface/Romberg/EO) P  Condition 2: (firm surface/Romberg/EC) P min sway  Condition 3: (firm surface/tandem/EO) F 6 sec R foot fwd   Condition 4: (firm surface/tandem/EC) NT  Condition 5: (soft surface/Romberg/EO) F 15 sec  Condition 6: (soft surface/Romberg/EC) NT             Evaluation 11/14/22   R SLS NT  (<10 sec = HIGH FALL RISK) 0 sec, R AFO + R knee cage   L SLS NT  (<10 sec = HIGH FALL RISK) 3 sec   30 sec chair rise 11 completed with BUE pushoff 9 completed with no BUE pushoff  11 completed with BUE pushoff   5 x sit<>stand 13 seconds with BUE pushoff 15 with no BUE pushoff  13 sec with BUE pushoff   TUG 12 seconds with RW  14 seconds with no AD; CGA    17 sec with R AFO only   14 sec with R AFO and R knee cage   SSWS 0.86 m/sec (6m/7s) with RW; CGA  1.2 m/sec (6m/5s) with no AD; CGA 0.75 m/sec (6m/8s) with R AFO and R knee cage   Fast walking speed 1.5 m/sec (6m/4s) with RW; CGA NT      Community Ambulator: > 1.4 m/s  Limited Community Ambulator: 0.6 - 0.8 m/s  Household Ambulator: < 0.4 m/s     30 second chair rise below average score:   Age                 Men                 Women  60-64               <14                  <12  65-69               <12                  <11  70-74               <12                  <10  75-79               <11                  <10  80-84               <10                  <9  85-89               <8                    <8  90-94               <7                    <4  A below average score indicates a risk for fall.     5 x sit<>stand  >12 sec= fall risk for general elderly  >16 sec= fall risk for Parkinson's disease  >10 sec= balance/vestibular dysfunction (<61 y/o)  >14.2 sec= balance/vestibular " dysfunction (>61 y/o)  >12 sec= fall risk for CVA       Treatment     Hernandez received the treatments listed below:      therapeutic exercises to develop strength, endurance, ROM, flexibility, posture, and core stabilization for 40 minutes includin minutes on Sci Fit recumbent stepper.  B UE/B LE on level 3.0    Time above also utilized for objective testing.    therapeutic activities to improve functional performance for 0 minutes, including:    neuromuscular re-education activities to improve: Balance, Coordination, Kinesthetic, Sense, Proprioception, and Posture for 0 minutes. The following activities were included:    gait training to improve functional mobility and safety for 0 minutes, including: With R knee cage           Patient Education and Home Exercises     Home Exercises Provided and Patient Education Provided     Education provided:   - 10/25/22: initial HEP provided with hooklying clams with green theraband and supine bridges  - 22: added prone knee flexion, ankle DF    Written Home Exercises Provided: yes. Exercises were reviewed and Hernandez was able to demonstrate them prior to the end of the session.  Hernandez demonstrated good  understanding of the education provided. See EMR under Patient Instructions for exercises provided during therapy sessions    ASSESSMENT     Hernandez tolerated today's reassessment well. Many improvements demonstrated in BLE strength, static balance, and quality of gait. He demonstrates safer gait with both R AFO and R knee cage donned and his static balance today improved due to knee cage preventing full hyperextension of his knee. Pt was educated on how resting in hyperextension of the knee negatively affects an individual's static balance and he verbalized understanding. Continue with POC.     Hernandez Is progressing well towards his goals.   Pt prognosis is Good.     Pt will continue to benefit from skilled outpatient physical therapy to address the deficits listed in the  problem list box on initial evaluation, provide pt/family education and to maximize pt's level of independence in the home and community environment.     Pt's spiritual, cultural and educational needs considered and pt agreeable to plan of care and goals.     Anticipated barriers to physical therapy: progressive nature of condition    Goals:  Short Term Goals: 4 weeks  Pt to be introduced to HEP and report > 50% compliance.  Pt to improve gross BLE strength by > 1/3 muscle grade on MMT.  Pt to improve TUG score to 13 sec with SPV and appropriate orthoses and/or AD to decrease risk of falls.  Pt to improve 30 sec chair rise score to 12.5 reps to show improved functional lower extremity endurance.  Pt to improve condition 3 on the GST to 5 seconds for improved balance in low vision environments with NBOS.   Pt to improve condition 6 on the GST to 8 seconds for improved balance in low vision environments on unstable surface.      Long Term Goals: 8 weeks   Pt to improve R ankle flexibility to WNL to improve fitting of R AFO.  Pt to improve gross B hip strength by > 2/3 muscle grade on MMT.  Pt to improve TUG score to 12 sec with SPV and appropriate orthoses and/or AD to decrease risk of falls.  Pt to improve 30 sec chair rise score to 12.5 reps to show improved functional lower extremity endurance.  Pt to improve condition 3 on the GST to 10 seconds for improved balance in low vision environments with NBOS.   Pt to improve condition 6 on the GST to 15 seconds for improved balance in low vision environments on unstable surface.     PLAN     Continue with POC.    Shayne Anaya, PT

## 2022-11-17 NOTE — PROGRESS NOTES
[facility]   Occupational Therapy Neurological Rehabilitation   Re-assessment    Name: Hernandez Hernandez  MRN: [id number]  Today's Date: 11/18/2022    Therapy Diagnosis:   Encounter Diagnosis   Name Primary?    Coordination impairments Yes       Physician: Danica Anne MD  Physician Orders: Neuro Program      Medical Diagnosis: G35 (ICD-10-CM) - Multiple sclerosis R25.1 (ICD-10-CM) - Tremor   Recent Surgery: * No surgery found *  Evaluation Date: 10/14/2022  Plan of Care Expiration Period: 12/9/2022  Insurance Authorization period Expiration: 12/31/2022  Date of Return to MD: 10/31: ophamology; MRI 11/1/22; 12/16/2022 Neurology   FOTO: 2/3    Visit # / Visits Authorized: 7 / 12 (+evaluation)  Time In: 10:50  Time Out: 11:32  Total Billable Time: 42 minutes    Precautions:   Standard and Fall    Subjective   Pt reports: Pt reported fall prior to therapy session today at condo   Not compliant with HEP at this time  Response to previous treatment: no complaints  Functional change: ongoing ; in an MS support group on [place]  Patient's Goals for Occupational Therapy: to learn new ways to fine tune his functional indep and safety     Pain upon arrival: 0/10  Location:n/a  Pain at cessation of session: 0/10    Objective   Hernandez received therapeutic exercises for 10 minutes including:  -upper body ergonometer on level 2.5; completed for 10 minutes switching directions mid way. Focus on postural control and breathing techniques with exhale with exertion of force.     Hernandez participated in dynamic functional therapeutic activities to improve functional performance for 22 minutes, including:  Re-assessment:   Functional Mobility:  Bed mobility: Mod I  Roll to left: Mod I  Roll to right: Mod I  Supine to sit: Mod I  Sit to supine: Mod I  Transfers to bed: Mod I  Transfers to toilet: Mod I  Car transfers: Mod I     ADL's:  Feeding: Mod I  Grooming: Mod I  Hygiene: Mod I  Upper Body Dressing: Mod I  Lower Body  Dressing: Mod I  Toileting: Mod I  Bathing: Mod I- standing      IADL's:   Homecare: Mod I  Cooking: Mod I  Laundry: Mod I  Use of telephone: Mod I  Money management: Mod I  Medication management: Mod I  Handwriting:Mod I- use of left hand primarily   Technology Use:Mod I     Comments: management at [facility] completes maintenance      Objective   Cognitive Exam:  Oriented: Person, Place, Time, and Situation  Behaviors: Cooperative  Follows Commands/attention: Follows multistep  commands  Communication: clear/fluent     Visual/Perceptual:  Tracking: impaired nystagmus at end ranges all directions  Saccades: impaired delayed all directions    Acuity: has bifocals  Convergence: impaired; >10 cm  Nystagmus: present    Comments: reported he is scheduled to see a neuro ophthalmologist in Dec  Additional: pt with exotropia of right eye/difficulty shifting past midline      Physical Exam:  Postural examination/scapula alignment: Rounded shoulder and Head forward  Joint integrity: Firm end feeling  Skin integrity: intact  Edema: none noted       Joint Evaluation: bilateral upper extremity AROM WFL     Fist: normal; hyper mobile for left 4th digit      Strength: 5/5 throughout bilateral upper extremities       Strength: (ROBERT Dynamometer in lbs.) Position II:       10/14/2022 10/14/2022 11/18/22 11/18/22     Right Left Right Left   Rung II 66.2 # 84.2 # 44.1# 77.8#   Norms for  Strength 60-69  Male: Right Left   63 lbs 56 lbs      Pinch Strength (Measured in psi)       10/14/2022 10/14/2022 11/18/22 11/18/22     Right Left Right Left   Key Pinch 15 psi 19.5 psi 10 19   3pt Pinch 15 psi 21 psi 14 24   2pt Pinch 12 psi 14 psi 9 13      Fine Motor Coordination: 9 Hole Peg Test     Right   10/14/2022 Left   10/14/2022 11/18/22  Right 11/18/22  Left                1:25.8 min              33.7 s 1:22.2 min  33.9 s      Box and Block  Right   10/14/2022 Left   10/14/2022 Right  11/18/22 Left  11/18/22              26               30 19 36      Gross motor coordination:   RICHARD (Rapid Alternating Movements): delayed right   Finger to Nose (3 times): dysmetria for right (moderate)  Finger Flicks (coordination moving from digit flexion to digit extension): delayed right      Tone:  Modified Lexa Scale:   0 - No increase in muscle tone     Sensation:  Hernandez  reports no complaints or changes      Balance:   Static Sitting- GOOD-: Takes MODERATE challenges from all directions but inconsistently  Dynamic Sitting- GOOD-: Takes MODERATE challenges from all directions but inconsistently  Static Standing - FAIR-: Maintains without assist but inconsistent   Dynamic Standing - defer to PT for details      Functional mobility:   Contact guard assist with no AD and with rolling walker     Endurance Deficit: mild      Home Exercises and Education Provided   Education provided:   - Freyai-Glennadi glove for tremor managment  - information for evaluation and implementation   - use of cart at condo to bring up groceries in 1 trip from car   - edu on spoon method for energy conservation   - points of stability for tremor managment   - wrist weights for proprioceptive input   - role of Occupational Therapy in care, goals for Occupational Therapy for this plan of care, scheduling   - Progress towards goals     Written Home Exercises Provided:  none  .    See EMR under Patient Instructions for exercises provided 10/27/2022: Wire grocery cart; energy conservation; spoon method; wrist weights   11/7: MS Society Stretching Program -- pages 11-13; 18-20  11/11: research article for weighted gloves to aid in tremor managment  and reduction      Assessment   Mace with reported fall prior to therapy session today. He remains resistant to modifications and adaptations. He continues to require cues for points of stability and tremor managment techniques. Pt with noted fatigue following physical therapy session with decline in strength noted on re-assessment for right  side. Pt with inconsistent presentation for right coordination on objective measurement with improvement noted on 9 hole peg by 3.6 s and 7 less blocks on right with box and block assessment.     Hernandez is progressing well towards his goals and there are no updates to goals at this time. Pt prognosis is Good.     Pt will continue to benefit from skilled outpatient occupational therapy to address the deficits listed in the problem list on initial evaluation provide pt/family education and to maximize pt's level of independence in the home and community environment.     Anticipated barriers to occupational therapy: insight ; limited carry over     Pt's spiritual, cultural and educational needs considered and pt agreeable to plan of care and goals.    Goals:  Long Term Goals:  Time frame: 8 weeks  - Pt will decrease time on 9HPT by 15 seconds right hand to improve fine motor speed and coordination needed to perform bimanual daily tasks and activities. Improvement by 3.6 s on 11/18  - Pt will complete Box and Blocks Test with PING, transferring 28 blocks, to demonstrate improved gross manual coordination needed for ADL and IADL tasks. Not met; ongoing   - Pt will be independent with Home Exercise Program (HEP)/Home Activity Program (HAP) to promote long term maintenance of progress made in therapy. Ongoing   - Pt will demonstrate consistent use of compensatory strategies/adaptive equipment during bimanual/fine motor tasks to maximize UE control. Ongoing   - Pt will improve FOTO limitation score to less than or equal to 38% for improved self perception of functional performance with daily activities. Not met; 48% on 11/14     Short Term Goals:  Time frame: 4 weeks  - Pt will decrease time on 9HPT by 10 seconds right hand to improve fine motor speed and coordination needed to perform bimanual daily tasks and activities. Improvement by 3.6 s on 11/18  - Pt will demonstrate knowledge and use of energy conservation and work  simplification strategies during ADL and IADL tasks. Ongoing   - Pt will demonstrate consistent use of 3 point stabilization technique during bimanual/fine motor tasks to maximize UE control. Ongoing   - Pt will be independent with Home Exercise Program (HEP)/Home Activity Program (HAP) and report at least 50% compliance. Ongoing      Goals to be adjusted and added within plan of care as appropriate.      The following goals were discussed with the patient and patient is in agreement with them as to be addressed in the treatment plan.     Plan   Certification Period/Plan of care expiration: 10/14/2022 to 12/9/2022.     Outpatient Occupational Therapy 2 times weekly for 8 weeks to include the following interventions: Neuromuscular Re-ed, Patient Education, Self Care, Therapeutic Activities, and Therapeutic Exercise.     Updates/Grading for next session:ongoing       LIANE Medina Neuro MELISSA Certified   Neuro Occupational Therapist   [facility] - [facility]   11/18/2022

## 2022-11-18 ENCOUNTER — CLINICAL SUPPORT (OUTPATIENT)
Dept: REHABILITATION | Facility: HOSPITAL | Age: 62
End: 2022-11-18
Attending: STUDENT IN AN ORGANIZED HEALTH CARE EDUCATION/TRAINING PROGRAM
Payer: MEDICARE

## 2022-11-18 DIAGNOSIS — R26.89 IMBALANCE: ICD-10-CM

## 2022-11-18 DIAGNOSIS — R26.9 GAIT ABNORMALITY: Primary | ICD-10-CM

## 2022-11-18 DIAGNOSIS — R27.8 COORDINATION IMPAIRMENTS: Primary | ICD-10-CM

## 2022-11-18 PROCEDURE — 97116 GAIT TRAINING THERAPY: CPT | Mod: PO

## 2022-11-18 PROCEDURE — 97110 THERAPEUTIC EXERCISES: CPT | Mod: PO

## 2022-11-18 PROCEDURE — 97530 THERAPEUTIC ACTIVITIES: CPT | Mod: PO

## 2022-11-18 NOTE — PROGRESS NOTES
[facility]   Physical Therapy Treatment Note     Name: Hernandez Hernandez  Clinic Number: [id number]    Therapy Diagnosis:   Encounter Diagnoses   Name Primary?    Gait abnormality Yes    Imbalance      Physician: Danica Anne MD    Visit Date: 2022    Physician Orders: PT Eval and Treat   Medical Diagnosis from Referral:   G35 (ICD-10-CM) - Multiple sclerosis   R25.1 (ICD-10-CM) - Tremor   Evaluation Date: 10/13/2022  Authorization Period Expiration: 22  Plan of Care Expiration: 23  Plan of Care Certification Period: 10/13/22 - 23   Visit # / Visits authorized:  (+ initial eval)    PTA Visit #: 0/5     Time In: 1003  Time Out: 1045  Total Billable Time: 42 minutes    Precautions: Fall, Standard    SUBJECTIVE     Pt reports: he tried his ankle exercises with 2.5#.  He is not compliant with HEP.  Response to previous treatment: no adverse effects  Functional change: ongoing    Pain: 0/10  Location: N/A     OBJECTIVE     No objective measures taken at this visit.    Treatment     Hernandez received the treatments listed below:      therapeutic exercises to develop strength, endurance, ROM, flexibility, posture, and core stabilization for 34 minutes includin minutes on Sci Fit recumbent stepper.  B UE/B LE on level 3.0    2 x 5, 2 sec hold R ankle dorsiflexion in long sitting    Prone knee flexion - 2 x 10 L side with 2# ankle weight; 4 x 5 R side active-assisted after 2 full reps on first two sets.   - decreased performance speed    Time above also utilized for rest breaks between exercises.    therapeutic activities to improve functional performance for 0 minutes, including:    neuromuscular re-education activities to improve: Balance, Coordination, Kinesthetic, Sense, Proprioception, and Posture for 0 minutes. The following activities were included:    gait training to improve functional mobility and safety for 8 minutes, includin total feet  ambulating with R knee cage, R AFO; CGA with modA x 3 due to near     Time above includes time for pt education regarding safety concerns with falls, the progressive nature of his dx, and the benefits of equipment (knee cage, AFO, AD).        Patient Education and Home Exercises     Home Exercises Provided and Patient Education Provided     Education provided:   - 10/25/22: initial HEP provided with hooklying clams with green theraband and supine bridges  - 11/7/22: added prone knee flexion, ankle DF    Written Home Exercises Provided: yes. Exercises were reviewed and Hernandez was able to demonstrate them prior to the end of the session.  Hernandez demonstrated good  understanding of the education provided. See EMR under Patient Instructions for exercises provided during therapy sessions    ASSESSMENT     Hernandez tolerated today's session fairly well. Pt reported a fall that occurred this morning walking over what he described to be a 1/4 inch raised turf. Pt demonstrates improved quality and strength with prone knee flexion today, requiring less assistance from PT despite reporting that he feels the same. Continue with POC.     Hernandez Is progressing well towards his goals.   Pt prognosis is Good.     Pt will continue to benefit from skilled outpatient physical therapy to address the deficits listed in the problem list box on initial evaluation, provide pt/family education and to maximize pt's level of independence in the home and community environment.     Pt's spiritual, cultural and educational needs considered and pt agreeable to plan of care and goals.     Anticipated barriers to physical therapy: progressive nature of condition    Goals:  Short Term Goals: 4 weeks  Pt to be introduced to HEP and report > 50% compliance. progressing  Pt to improve gross BLE strength by > 1/3 muscle grade on MMT. progressing  Pt to improve TUG score to 13 sec with SPV and appropriate orthoses and/or AD to decrease risk of falls.  progressing  Pt to improve 30 sec chair rise score to 12.5 reps to show improved functional lower extremity endurance. progressing  Pt to improve condition 3 on the GST to 5 seconds for improved balance in low vision environments with NBOS.  progressing  Pt to improve condition 6 on the GST to 8 seconds for improved balance in low vision environments on unstable surface.  progressing     Long Term Goals: 8 weeks   Pt to improve R ankle flexibility to WNL to improve fitting of R AFO. ongoing  Pt to improve gross B hip strength by > 2/3 muscle grade on MMT. ongoing  Pt to improve TUG score to 12 sec with SPV and appropriate orthoses and/or AD to decrease risk of falls. ongoing  Pt to improve 30 sec chair rise score to 12.5 reps to show improved functional lower extremity endurance. ongoing  Pt to improve condition 3 on the GST to 10 seconds for improved balance in low vision environments with NBOS.  ongoing  Pt to improve condition 6 on the GST to 15 seconds for improved balance in low vision environments on unstable surface. ongoing    PLAN     Continue with POC.    Shayne Anaya, PT

## 2022-11-21 NOTE — PROGRESS NOTES
[facility]   Occupational Therapy Neurological Rehabilitation   Treatement    Name: Hernandez Hernandez  MRN: [id number]  Today's Date: 11/22/2022    Therapy Diagnosis:   Encounter Diagnosis   Name Primary?    Coordination impairments Yes       Physician: Danica Anne MD  Physician Orders: Neuro Program      Medical Diagnosis: G35 (ICD-10-CM) - Multiple sclerosis R25.1 (ICD-10-CM) - Tremor   Recent Surgery: * No surgery found *  Evaluation Date: 10/14/2022  Plan of Care Expiration Period: 12/9/2022  Insurance Authorization period Expiration: 12/31/2022  Date of Return to MD: 10/31: ophamology; MRI 11/1/22; 12/16/2022 Neurology   FOTO: 2/3    Visit # / Visits Authorized: 8 / 12 (+evaluation)  Time In: 9:30am  Time Out: 10:15am  Total Billable Time: 45 minutes    Precautions:   Standard and Fall    Subjective   Pt reports: I like to hear the rational behind the information you're giving   Not compliant with HEP at this time  Response to previous treatment: no complaints  Functional change: ongoing ; in an MS support group on [employer]  Patient's Goals for Occupational Therapy: to learn new ways to fine tune his functional indep and safety     Pain upon arrival: 0/10  Location:n/a  Pain at cessation of session: 0/10    Objective   Hernandez received therapeutic exercises for 10 minutes including:  -upper body ergonometer on level 2.5; completed for 10 minutes switching directions mid way. Focus on postural control and breathing techniques with exhale with exertion of force.     Hernandez participated in dynamic functional therapeutic activities to improve functional performance for 35 minutes, including:  - energy conservation strategies  - fine motor tasking R hand   - connect 4 3 disc at a time in hand   - doubloon  x4 at a time placing into slotted cup   - metal ball in hand juggle x2 min with direction changes       Home Exercises and Education Provided   Education provided:   - Readi-Steadi glove for  tremor managment  - information for evaluation and implementation   - use of cart at condo to bring up groceries in 1 trip from car   - edu on spoon method for energy conservation   - points of stability for tremor managment   - wrist weights for proprioceptive input   - role of Occupational Therapy in care, goals for Occupational Therapy for this plan of care, scheduling   - Progress towards goals     Written Home Exercises Provided:  none  .    See EMR under Patient Instructions for exercises provided 10/27/2022: Wire grocery cart; energy conservation; spoon method; wrist weights   11/7: MS Society Stretching Program -- pages 11-13; 18-20 11/11: research article for weighted gloves to aid in tremor managment  and reduction      Assessment     Hernandez tolerated session well today. He was somewhat receptive to EC strategies but will likely need further encouragement to incorporate more regularly. He was more aware of need to control degrees of freedom to reduce R UE tremor.     Hernandez is progressing well towards his goals and there are no updates to goals at this time. Pt prognosis is Good.     Pt will continue to benefit from skilled outpatient occupational therapy to address the deficits listed in the problem list on initial evaluation provide pt/family education and to maximize pt's level of independence in the home and community environment.     Anticipated barriers to occupational therapy: insight ; limited carry over     Pt's spiritual, cultural and educational needs considered and pt agreeable to plan of care and goals.    Goals:  Long Term Goals:  Time frame: 8 weeks  - Pt will decrease time on 9HPT by 15 seconds right hand to improve fine motor speed and coordination needed to perform bimanual daily tasks and activities. Improvement by 3.6 s on 11/18  - Pt will complete Box and Blocks Test with RUE, transferring 28 blocks, to demonstrate improved gross manual coordination needed for ADL and IADL tasks. Not met;  ongoing   - Pt will be independent with Home Exercise Program (HEP)/Home Activity Program (HAP) to promote long term maintenance of progress made in therapy. Ongoing   - Pt will demonstrate consistent use of compensatory strategies/adaptive equipment during bimanual/fine motor tasks to maximize UE control. Ongoing   - Pt will improve FOTO limitation score to less than or equal to 38% for improved self perception of functional performance with daily activities. Not met; 48% on 11/14     Short Term Goals:  Time frame: 4 weeks  - Pt will decrease time on 9HPT by 10 seconds right hand to improve fine motor speed and coordination needed to perform bimanual daily tasks and activities. Improvement by 3.6 s on 11/18  - Pt will demonstrate knowledge and use of energy conservation and work simplification strategies during ADL and IADL tasks. Ongoing   - Pt will demonstrate consistent use of 3 point stabilization technique during bimanual/fine motor tasks to maximize UE control. Ongoing   - Pt will be independent with Home Exercise Program (HEP)/Home Activity Program (HAP) and report at least 50% compliance. Ongoing      Goals to be adjusted and added within plan of care as appropriate.      The following goals were discussed with the patient and patient is in agreement with them as to be addressed in the treatment plan.     Plan   Certification Period/Plan of care expiration: 10/14/2022 to 12/9/2022.     Outpatient Occupational Therapy 2 times weekly for 8 weeks to include the following interventions: Neuromuscular Re-ed, Patient Education, Self Care, Therapeutic Activities, and Therapeutic Exercise.     Updates/Grading for next session:ongoing       Rubin ROB  11/21/2022

## 2022-11-22 ENCOUNTER — CLINICAL SUPPORT (OUTPATIENT)
Dept: REHABILITATION | Facility: HOSPITAL | Age: 62
End: 2022-11-22
Attending: STUDENT IN AN ORGANIZED HEALTH CARE EDUCATION/TRAINING PROGRAM
Payer: MEDICARE

## 2022-11-22 DIAGNOSIS — R26.9 GAIT ABNORMALITY: Primary | ICD-10-CM

## 2022-11-22 DIAGNOSIS — R26.89 IMBALANCE: ICD-10-CM

## 2022-11-22 DIAGNOSIS — R27.8 COORDINATION IMPAIRMENTS: Primary | ICD-10-CM

## 2022-11-22 PROCEDURE — 97116 GAIT TRAINING THERAPY: CPT | Mod: PO,CQ

## 2022-11-22 PROCEDURE — 97110 THERAPEUTIC EXERCISES: CPT | Mod: PO,CQ

## 2022-11-22 PROCEDURE — 97110 THERAPEUTIC EXERCISES: CPT | Mod: PO

## 2022-11-22 PROCEDURE — 97530 THERAPEUTIC ACTIVITIES: CPT | Mod: PO

## 2022-11-22 NOTE — PATIENT INSTRUCTIONS
"     Saving Energy: Motion Economy     AVOID RUSHING   Organize activities and try to do them at the same way at all times. Repetition  makes you proficient and will save time and energy.    Take frequents rests. This will prevent over fatigue and leave you ready to go on  with other activities. Do diaphragmatic breathing when resting.   Spread heavy and light tasks throughout the day. Consider when the best time is  for each activity. When do you have the most energy and move your best?   Set priorities and eliminate unnecessary tasks.   Analyze each task to be done, develop an easier method- "work simplification".    AVOID UNNECESSARY MOTIONS   Sit instead of stand for any lengthy task (greater than 5-10 minutes).   Use both hands in a smooth flowing motion, in opposite and symmetrical  Direction.   Avoid holding or lifting. Use a wheeled cart or slide things.   Avoid over reach and bending by arranging work area within normal reach.   Avoid lifting, pushing, or carrying heavy things.   Avoid running up or down stairs.   Arrange work space for specific tasks with supplies and equipment arranged well.   Live simply, avoid unnecessary cluttering of items.    Use Labor saving equipment.   Good posture prevents fatigue.    USE GOOD BODY MECHANICS   Use stronger muscles to push, pull, lifting, and climbing.    PROPER WORKING CONDITIONS   Correct counter height for standing- 2 inch below bent elbow.   Correct chair height. 2 inch below elbow.    Work within range of reach putting frequent used items for easier reach.    Obtain items needed prior to starting task.    Good air flow.   Good lighting.   Work in a Relaxed manner- use music, simplify task, use diaphragmatic breathing, walk slowly.   Exhale when you do strenuous part of activity.    Mental hygiene- control worrying, use your energy to change things you can and accept those things you can't change.   Encourage family teamwork to help.           Energy " Conservation  TAKE TIME to plan a good work program.   Pace yourself daily and build activity level slowly.   Space activities over a period of time; use a calendar to plan ahead.   Plan a balance of work, recreation, exercise and rest.     CANCEL all tasks that are not necessary.   Let dishes air dry; eliminate ironing if possible.   Put away furnishing that need extra work. For example, knick knacks that  need dusting.    DELEGATE responsibility to others   Allow family and friends to help with tasks that are difficult or tiring.    ARRANGE equipment and supplies to save time and movement.    Organize work areas to make tools or appliances easy to reach and grasp.    SIT to work whenever possible.   Keep a stool or chair at different work areas; specifically kitchen and  Bathroom. Sit to fold clothes or to do food prep are examples of when to sit.    AVOID holding and carrying objects.   Use a cart on wheels to move items like garbage cans or laundry. Use rolling    Mop bucket.    Slide whenever possible like pots on countertop.    ADJUST the heights of work surfaces for good posture.   Work surface should be 2 inches below elbows. Have materials located near  you.     INCLUDE rest periods in your daily schedule.   Avoid fatigue, rest before you tire.   Hurry and worry will increase fatigue.    SIMPLIFY your methods of work until they are habit.   Use prepared foods, one step or one meal dishes.   Serve from pots on stove instead of transferring to serving dishes.   Do not begin activities that cannot be stopped if it is too fatiguing.   Use energy saving equipment (, electric can opener, etc.).        Grooming: Brushing Teeth        Use electric toothbrush to brush teeth. Sit to brush. Support elbow on table or counter.  Copyright © I. All rights reserved.     Grooming: Drying Hair        Support elbows to dry hair. This is a good technique for other grooming tasks. Move head to save arm  movements.  Copyright © I. All rights reserved.     Grooming: Shaving        Sit in chair to use electric shaver.  Support arm using other hand, or rest elbow on vanity.  Copyright © I. All rights reserved.     Bathing: Drying Off        Put on tessa david immediately after stepping out of shower or bath.  Sit down and pat to dry off.  Copyright © I. All rights reserved.     Food Preparation: Pots / Dishware        Do not lift pots to serve food. Instead, tip pots and ladle food onto plates. Avoid heavy pans and dishes as much as possible.  Copyright © I. All rights reserved.     Home Management: Pots / Pans        Leave frequently used pots on stove or counter instead of putting away and taking out each time.  Copyright © I. All rights reserved.     Kitchen Clean Up        Place towel on work surface and washable non-skid mat on floor to minimize need to wipe counter or mop floor.  Copyright © I. All rights reserved.     Home / Work Management: Laundry - Loading Wash        When preparing clothes for washing, keep items at waist height. Choose a top-loading rather than front-loading washing machine, if possible.  Copyright © I. All rights reserved.     Housework: Sweeping        When sweeping, avoid bending over. Instead use a long-handled broom and dustpan.  Copyright © I. All rights reserved.     Making Bed        Try sitting when tucking sheets.  Place bed in area where you can reach all corners without straining.  Use lightweight bedding. Down comforter or cotton coverlet can serve as cover.  Copyright © I. All rights reserved.     Gardening        Use a tool belt or vest to carry tools. This saves extra trips to the tool shed. Alternate heavy work such as pruning, with lighter tasks. Sit whenever possible. Rest often.  Copyright © I. All rights reserved.

## 2022-11-22 NOTE — PROGRESS NOTES
"  [facility]   Physical Therapy Treatment Note     Name: Hernandez Hernandez  Clinic Number: [id number]    Therapy Diagnosis:   Encounter Diagnoses   Name Primary?    Gait abnormality Yes    Imbalance      Physician: Danica Anne MD    Visit Date: 11/22/2022    Physician Orders: PT Eval and Treat   Medical Diagnosis from Referral:   G35 (ICD-10-CM) - Multiple sclerosis   R25.1 (ICD-10-CM) - Tremor   Evaluation Date: 10/13/2022  Authorization Period Expiration: 12/31/22  Plan of Care Expiration: 01/05/23  Plan of Care Certification Period: 10/13/22 - 01/05/23   Visit # / Visits authorized: 8/12 (+ initial eval)    PTA Visit #: 1/5     Time In: 11:00  Time Out:11:45  Total Billable Time: 45 minutes    Precautions: Fall, Standard    SUBJECTIVE     Pt reports: " I'm doing fine."   He is not compliant with HEP.  Response to previous treatment: no adverse effects  Functional change: ongoing    Pain: 0/10  Location: N/A     OBJECTIVE     No objective measures taken at this visit.    Treatment     Hernandez received the treatments listed below:      therapeutic exercises to develop strength, endurance, ROM, flexibility, posture, and core stabilization for 35 minutes including:    10 minutes on Sci Fit recumbent stepper.  B UE/B LE on level 3.0    3 x 10 reps of R LE DF/PF while sitting on EOM and using the 2 point wooden fitter board with a #4lb weight placed in the front.  AAROM    Prone knee flexion - 2 x 10 L side with 2# ankle weight; 4 x 5 R side active-assisted after 2 full reps on first two sets.   - decreased performance speed    Time above also utilized for rest breaks between exercises.    therapeutic activities to improve functional performance for 0 minutes, including:    neuromuscular re-education activities to improve: Balance, Coordination, Kinesthetic, Sense, Proprioception, and Posture for 0 minutes. The following activities were included:    gait training to improve functional mobility " and safety for 10 minutes, includin +  total feet ambulating around the gym 2 laps, then down the hallway and to his car with R knee cage, R AFO; CGA,     Time above includes time for pt education regarding safety concerns with falls, the progressive nature of his dx, and the benefits of equipment (knee cage, AFO, AD).        Patient Education and Home Exercises     Home Exercises Provided and Patient Education Provided     Education provided:   - 10/25/22: initial HEP provided with hooklying clams with green theraband and supine bridges  - 22: added prone knee flexion, ankle DF    Written Home Exercises Provided: yes. Exercises were reviewed and Hernandez was able to demonstrate them prior to the end of the session.  Hernandez demonstrated good  understanding of the education provided. See EMR under Patient Instructions for exercises provided during therapy sessions    ASSESSMENT     Hernandez tolerated today's session fairly well and cont to focus on R hamstring and DF strength.  Mace cont to be hesitant about using any assistive devices or braces and cont to be educated on the importance of it.  Hernandez showed improvement with gait today with the knee cage and the AFO.  Continue with POC.     Hrenandez Is progressing well towards his goals.   Pt prognosis is Good.     Pt will continue to benefit from skilled outpatient physical therapy to address the deficits listed in the problem list box on initial evaluation, provide pt/family education and to maximize pt's level of independence in the home and community environment.     Pt's spiritual, cultural and educational needs considered and pt agreeable to plan of care and goals.     Anticipated barriers to physical therapy: progressive nature of condition    Goals:  Short Term Goals: 4 weeks  Pt to be introduced to HEP and report > 50% compliance. progressing  Pt to improve gross BLE strength by > 1/3 muscle grade on MMT. progressing  Pt to improve TUG score to 13 sec with  SPV and appropriate orthoses and/or AD to decrease risk of falls. progressing  Pt to improve 30 sec chair rise score to 12.5 reps to show improved functional lower extremity endurance. progressing  Pt to improve condition 3 on the GST to 5 seconds for improved balance in low vision environments with NBOS.  progressing  Pt to improve condition 6 on the GST to 8 seconds for improved balance in low vision environments on unstable surface.  progressing     Long Term Goals: 8 weeks   Pt to improve R ankle flexibility to WNL to improve fitting of R AFO. ongoing  Pt to improve gross B hip strength by > 2/3 muscle grade on MMT. ongoing  Pt to improve TUG score to 12 sec with SPV and appropriate orthoses and/or AD to decrease risk of falls. ongoing  Pt to improve 30 sec chair rise score to 12.5 reps to show improved functional lower extremity endurance. ongoing  Pt to improve condition 3 on the GST to 10 seconds for improved balance in low vision environments with NBOS.  ongoing  Pt to improve condition 6 on the GST to 15 seconds for improved balance in low vision environments on unstable surface. ongoing    PLAN     Continue with POC.    Domonique Arias, PTA

## 2022-11-25 ENCOUNTER — TELEPHONE (OUTPATIENT)
Dept: OPHTHALMOLOGY | Facility: CLINIC | Age: 62
End: 2022-11-25

## 2022-11-25 ENCOUNTER — OFFICE VISIT (OUTPATIENT)
Dept: OPHTHALMOLOGY | Facility: CLINIC | Age: 62
End: 2022-11-25
Payer: MEDICARE

## 2022-11-25 DIAGNOSIS — H51.23 INO (INTERNUCLEAR OPHTHALMOPLEGIA), BILATERAL: Primary | ICD-10-CM

## 2022-11-25 DIAGNOSIS — G35 MULTIPLE SCLEROSIS: Chronic | ICD-10-CM

## 2022-11-25 PROCEDURE — 1160F RVW MEDS BY RX/DR IN RCRD: CPT | Mod: CPTII,S$GLB,, | Performed by: OPHTHALMOLOGY

## 2022-11-25 PROCEDURE — 99203 PR OFFICE/OUTPT VISIT, NEW, LEVL III, 30-44 MIN: ICD-10-PCS | Mod: S$GLB,,, | Performed by: OPHTHALMOLOGY

## 2022-11-25 PROCEDURE — 99203 OFFICE O/P NEW LOW 30 MIN: CPT | Mod: S$GLB,,, | Performed by: OPHTHALMOLOGY

## 2022-11-25 PROCEDURE — 1159F PR MEDICATION LIST DOCUMENTED IN MEDICAL RECORD: ICD-10-PCS | Mod: CPTII,S$GLB,, | Performed by: OPHTHALMOLOGY

## 2022-11-25 PROCEDURE — 1159F MED LIST DOCD IN RCRD: CPT | Mod: CPTII,S$GLB,, | Performed by: OPHTHALMOLOGY

## 2022-11-25 PROCEDURE — 99999 PR PBB SHADOW E&M-EST. PATIENT-LVL II: ICD-10-PCS | Mod: PBBFAC,,, | Performed by: OPHTHALMOLOGY

## 2022-11-25 PROCEDURE — 1160F PR REVIEW ALL MEDS BY PRESCRIBER/CLIN PHARMACIST DOCUMENTED: ICD-10-PCS | Mod: CPTII,S$GLB,, | Performed by: OPHTHALMOLOGY

## 2022-11-25 PROCEDURE — 99999 PR PBB SHADOW E&M-EST. PATIENT-LVL II: CPT | Mod: PBBFAC,,, | Performed by: OPHTHALMOLOGY

## 2022-11-25 NOTE — LETTER
[address]  [address]  [facility] [address]  Phone: [phone]  Fax: [phone]   November 25, 2022    Danica Anne MD  [address]  [facility] [address]    Patient: Hernandez Hernandez   MR Number: [id number]   YOB: 1960   Date of Visit: 11/25/2022       Dear Dr. Anne:    Thank you for referring Hernandez Hernandez to me for evaluation. Here is my assessment and plan of care:    Assessment     Below you will find my full exam findings. If you have questions, please do not hesitate to call me. I look forward to following Mr. Hernandez Hernandez along with you.    Sincerely,          Devin Morton MD       CC  No Recipients             Base Eye Exam       Visual Acuity (Snellen - Linear)         Right Left    Dist sc 20/60 -2 20/60 -1    Dist cc 20/40 -1 20/30 -2              Tonometry (Applanation, 3:21 PM)         Right Left    Pressure 14 14              Pupils         Dark Light Shape React APD    Right 4 2 Round Brisk None    Left                   Visual Fields (Counting fingers)         Right Left     Full Full              Extraocular Movement         Right Left     Full Full   30 diopter exotropia  at distance. Delayed adduction saccades in both eyes. Abduction nystagmus in both eyes.              Neuro/Psych       Oriented x3: Yes    Mood/Affect: Normal              Dilation       Both eyes: 1% Mydriacyl, 2.5% Phenylephrine @ 3:21 PM                  Slit Lamp and Fundus Exam       External Exam         Right Left    External Normal Normal              Slit Lamp Exam         Right Left    Lids/Lashes Normal Normal    Conjunctiva/Sclera White and quiet White and quiet    Cornea Clear Clear    Anterior Chamber Deep and quiet Deep and quiet    Iris Round and reactive Round and reactive    Lens 1+ Nuclear sclerosis 1+ Nuclear sclerosis    Vitreous Normal Normal              Fundus Exam         Right Left    Disc Normal Normal    C/D Ratio 0.3 0.3    Macula Normal Normal     Vessels Normal Normal    Periphery Normal Normal

## 2022-11-25 NOTE — PROGRESS NOTES
HPI    62 year old male was referred by Dr.Xuan Morgan for multiple sclerosis.   Patient stated not sure why he is here today. Patient doesn't have no   vision complaints. Patient is diagnosed with CN III palsy and OD   exotropia.  +floaters   - flashes   - headaches   - pain     Eye medication   Nones  No Diabetes     I have personally interviewed the patient, reviewed the history and   examined the patient and agree with the technician's exam.   Last edited by Devin Morton MD on 11/25/2022  2:54 PM.            Assessment /Plan     For exam results, see Encounter Report.    AB (internuclear ophthalmoplegia), bilateral    Multiple sclerosis      Bilateral AB is pathognomonic for MS. He has no symptoms of diplopia or nystagmus that would prompt intervention. He will return to me as requested.

## 2022-11-25 NOTE — PROGRESS NOTES
[facility]   Occupational Therapy Neurological Rehabilitation   Treatement    Name: Hernandez Hernandez  MRN: [id number]  Today's Date: 11/28/2022    Therapy Diagnosis:   Encounter Diagnosis   Name Primary?    Coordination impairments Yes       Physician: Danica Anne MD  Physician Orders: Neuro Program      Medical Diagnosis: G35 (ICD-10-CM) - Multiple sclerosis R25.1 (ICD-10-CM) - Tremor   Recent Surgery: * No surgery found *  Evaluation Date: 10/14/2022  Plan of Care Expiration Period: 12/9/2022  Insurance Authorization period Expiration: 12/31/2022  Date of Return to MD: 12/16/2022 Neurology   FOTO: 2/3    Visit # / Visits Authorized: 9 / 12 (+evaluation)  Time In: 11:50  Time Out: 12:35  Total Billable Time: 45 minutes    Precautions:   Standard and Fall    Subjective   Pt reports: nothing new  Not compliant with HEP at this time- not using purchased weight for tremor managment    Response to previous treatment: no complaints  Functional change: ongoing ; in an MS support group on [employer]  Patient's Goals for Occupational Therapy: to learn new ways to fine tune his functional indep and safety     Pain upon arrival: 0/10  Location:n/a  Pain at cessation of session: 0/10    Objective     Hernandez received therapeutic exercises for 10 minutes including:  -upper body ergonometer on level 2.8; completed for 10 minutes switching directions mid way. Focus on postural control and breathing techniques with exhale with exertion of force. MET average 2.1    Hernandez participated in dynamic functional therapeutic activities to improve functional performance for 30 minutes, including:  -cognitive task: watching video on how to play and set up mancala   -left hand manipulation for stones set up into small containers - 4 item   -right hand manipulation for in palm translation for stones for game completion   -moderate cues for cognitive component of game     Functional mobility with rolling walker and stand by assist      Home Exercises and Education Provided   Education provided:   - Freyai-Steadi glove for tremor managment  - information for evaluation and implementation   - use of cart at condo to bring up groceries in 1 trip from car   - edu on spoon method for energy conservation   - points of stability for tremor managment   - wrist weights for proprioceptive input   - role of Occupational Therapy in care, goals for Occupational Therapy for this plan of care, scheduling   - Progress towards goals     Written Home Exercises Provided:  none  .    See EMR under Patient Instructions for exercises provided 10/27/2022: Wire grocery cart; energy conservation; spoon method; wrist weights   11/7: MS Society Stretching Program -- pages 11-13; 18-20 11/11: research article for weighted gloves to aid in tremor managment  and reduction      Assessment   Hernandez demo mild improvement of control for right hand for translation task with points of stability technique. He demo resistiveness to education with poor carry over for education to this point in therapy. Re assessment to be completed next session.     Hernandez is progressing well towards his goals and there are no updates to goals at this time. Pt prognosis is Good.     Pt will continue to benefit from skilled outpatient occupational therapy to address the deficits listed in the problem list on initial evaluation provide pt/family education and to maximize pt's level of independence in the home and community environment.     Anticipated barriers to occupational therapy: insight ; limited carry over     Pt's spiritual, cultural and educational needs considered and pt agreeable to plan of care and goals.    Goals:  Long Term Goals:  Time frame: 8 weeks  - Pt will decrease time on 9HPT by 15 seconds right hand to improve fine motor speed and coordination needed to perform bimanual daily tasks and activities. Improvement by 3.6 s on 11/18  - Pt will complete Box and Blocks Test with PING  transferring 28 blocks, to demonstrate improved gross manual coordination needed for ADL and IADL tasks. Not met; ongoing   - Pt will be independent with Home Exercise Program (HEP)/Home Activity Program (HAP) to promote long term maintenance of progress made in therapy. Ongoing   - Pt will demonstrate consistent use of compensatory strategies/adaptive equipment during bimanual/fine motor tasks to maximize UE control. Ongoing   - Pt will improve FOTO limitation score to less than or equal to 38% for improved self perception of functional performance with daily activities. Not met; 48% on 11/14     Short Term Goals:  Time frame: 4 weeks  - Pt will decrease time on 9HPT by 10 seconds right hand to improve fine motor speed and coordination needed to perform bimanual daily tasks and activities. Improvement by 3.6 s on 11/18  - Pt will demonstrate knowledge and use of energy conservation and work simplification strategies during ADL and IADL tasks. Ongoing   - Pt will demonstrate consistent use of 3 point stabilization technique during bimanual/fine motor tasks to maximize UE control. Ongoing; inconsistent    - Pt will be independent with Home Exercise Program (HEP)/Home Activity Program (HAP) and report at least 50% compliance. Ongoing      Goals to be adjusted and added within plan of care as appropriate.      The following goals were discussed with the patient and patient is in agreement with them as to be addressed in the treatment plan.     Plan   Certification Period/Plan of care expiration: 10/14/2022 to 12/9/2022.     Outpatient Occupational Therapy 2 times weekly for 8 weeks to include the following interventions: Neuromuscular Re-ed, Patient Education, Self Care, Therapeutic Activities, and Therapeutic Exercise.     Updates/Grading for next session: re-assessment and LIANE Cochran 11/28/2022

## 2022-11-25 NOTE — PATIENT INSTRUCTIONS
He has no symptoms of diplopia or nystagmus that would prompt intervention. He will return to me as requested.

## 2022-11-28 ENCOUNTER — CLINICAL SUPPORT (OUTPATIENT)
Dept: REHABILITATION | Facility: HOSPITAL | Age: 62
End: 2022-11-28
Attending: STUDENT IN AN ORGANIZED HEALTH CARE EDUCATION/TRAINING PROGRAM
Payer: MEDICARE

## 2022-11-28 DIAGNOSIS — R27.8 COORDINATION IMPAIRMENTS: Primary | ICD-10-CM

## 2022-11-28 DIAGNOSIS — R26.9 GAIT ABNORMALITY: Primary | ICD-10-CM

## 2022-11-28 DIAGNOSIS — R26.89 IMBALANCE: ICD-10-CM

## 2022-11-28 PROCEDURE — 97110 THERAPEUTIC EXERCISES: CPT | Mod: PO

## 2022-11-28 PROCEDURE — 97530 THERAPEUTIC ACTIVITIES: CPT | Mod: PO

## 2022-11-28 NOTE — PROGRESS NOTES
[facility]   Physical Therapy Treatment Note     Name: Hernandez Hernandez  Clinic Number: [id number]    Therapy Diagnosis:   Encounter Diagnoses   Name Primary?    Gait abnormality Yes    Imbalance        Physician: Danica Anne MD    Visit Date: 11/28/2022    Physician Orders: PT Eval and Treat   Medical Diagnosis from Referral:   G35 (ICD-10-CM) - Multiple sclerosis   R25.1 (ICD-10-CM) - Tremor   Evaluation Date: 10/13/2022  Authorization Period Expiration: 12/31/22  Plan of Care Expiration: 01/05/23  Plan of Care Certification Period: 10/13/22 - 01/05/23   Visit # / Visits authorized: 9/12 (+ initial eval)    PTA Visit #: 0/5     Time In: 1102  Time Out: 1143  Total Billable Time: 41 minutes    Precautions: Fall, Standard    SUBJECTIVE     Pt reports: nothing new to report  He is not compliant with HEP.  Response to previous treatment: no adverse effects  Functional change: ongoing    Pain: 0/10  Location: N/A     OBJECTIVE     No objective measures taken at this visit.    Treatment     Hernandez received the treatments listed below:      therapeutic exercises to develop strength, endurance, ROM, flexibility, posture, and core stabilization for 41 minutes including:    10 minutes on Sci Fit recumbent stepper.  B UE/B LE on level 3.0    2 x 5, 2 sec hold R ankle dorsiflexion in long sitting    2 x 10 supine R SLR with attempt to dorsiflex ankle throughout; pt able to partially correct R hip ER    Prone knee flexion - 1 x 15, 1 x 20 L side with 2# ankle weight; 3 x 10 R side no weight, no assistance   - decreased performance speed    Time above also utilized for rest breaks between exercises.    therapeutic activities to improve functional performance for 0 minutes, including:    neuromuscular re-education activities to improve: Balance, Coordination, Kinesthetic, Sense, Proprioception, and Posture for 0 minutes. The following activities were included:    gait training to improve functional  mobility and safety for 0 minutes, including:       Patient Education and Home Exercises     Home Exercises Provided and Patient Education Provided     Education provided:   - 10/25/22: initial HEP provided with hooklying clams with green theraband and supine bridges  - 11/7/22: added prone knee flexion, ankle DF    Written Home Exercises Provided: yes. Exercises were reviewed and Hernandez was able to demonstrate them prior to the end of the session.  Hernandez demonstrated good  understanding of the education provided. See EMR under Patient Instructions for exercises provided during therapy sessions    ASSESSMENT     Hernandez tolerated today's session fairly well as we cont to focus on R hamstring and DF strength. We incorporated R hip flexion strength as well to continue strengthening the areas that most contribute to his significant gait deficits. Pt continues to demonstrate poor compliance with PT recommendations regarding equipment and assistive devices as well as participating in appropriate HEP to help with BLE strengthening. However, significant improvement with R knee flexion today as he tolerated 3 x 10 without any assistance from PT despite completing with assistance after two repetitions at previous sessions. Pt continues to make self-limiting statements insinuating that his current level of function will not improve. Reassess in ~2 weeks. Continue with POC.     Hernandez Is progressing well towards his goals.   Pt prognosis is Good.     Pt will continue to benefit from skilled outpatient physical therapy to address the deficits listed in the problem list box on initial evaluation, provide pt/family education and to maximize pt's level of independence in the home and community environment.     Pt's spiritual, cultural and educational needs considered and pt agreeable to plan of care and goals.     Anticipated barriers to physical therapy: progressive nature of condition, cognitive deficits    Goals:  Short Term Goals: 4  weeks  Pt to be introduced to HEP and report > 50% compliance. progressing  Pt to improve gross BLE strength by > 1/3 muscle grade on MMT. progressing  Pt to improve TUG score to 13 sec with SPV and appropriate orthoses and/or AD to decrease risk of falls. progressing  Pt to improve 30 sec chair rise score to 12.5 reps to show improved functional lower extremity endurance. progressing  Pt to improve condition 3 on the GST to 5 seconds for improved balance in low vision environments with NBOS.  progressing  Pt to improve condition 6 on the GST to 8 seconds for improved balance in low vision environments on unstable surface.  progressing     Long Term Goals: 8 weeks   Pt to improve R ankle flexibility to WNL to improve fitting of R AFO. ongoing  Pt to improve gross B hip strength by > 2/3 muscle grade on MMT. ongoing  Pt to improve TUG score to 12 sec with SPV and appropriate orthoses and/or AD to decrease risk of falls. ongoing  Pt to improve 30 sec chair rise score to 12.5 reps to show improved functional lower extremity endurance. ongoing  Pt to improve condition 3 on the GST to 10 seconds for improved balance in low vision environments with NBOS.  ongoing  Pt to improve condition 6 on the GST to 15 seconds for improved balance in low vision environments on unstable surface. ongoing    PLAN     Continue with POC.    Shayen Anaya, PT

## 2022-12-01 ENCOUNTER — PATIENT MESSAGE (OUTPATIENT)
Dept: PSYCHIATRY | Facility: CLINIC | Age: 62
End: 2022-12-01
Payer: MEDICARE

## 2022-12-01 NOTE — PROGRESS NOTES
[facility]   Occupational Therapy Neurological Rehabilitation   Treatement    Name: Hernandez Hernandez  MRN: [id number]  Today's Date: 12/2/2022    Therapy Diagnosis:   Encounter Diagnosis   Name Primary?    Coordination impairments Yes       Physician: Danica Anne MD  Physician Orders: Neuro Program      Medical Diagnosis: G35 (ICD-10-CM) - Multiple sclerosis R25.1 (ICD-10-CM) - Tremor   Recent Surgery: * No surgery found *  Evaluation Date: 10/14/2022  Plan of Care Expiration Period: 12/9/2022  Insurance Authorization period Expiration: 12/31/2022  Date of Return to MD: 12/16/2022 Neurology - O[facility]  FOTO: 2/3    Visit # / Visits Authorized: 10 / 12 (+evaluation)  Time In: 10:48  Time Out: 11:42  Total Billable Time: 54 minutes    Precautions:   Standard and Fall    Subjective   Pt reports: I have learned some things since starting to help me be safe   Not compliant with HEP at this time- not using purchased weight for tremor managment    Response to previous treatment: no complaints  Functional change: ongoing ; in an MS support group on [employer]  Patient's Goals for Occupational Therapy: to learn new ways to fine tune his functional indep and safety     Pain upon arrival: 0/10  Location:n/a  Pain at cessation of session: 0/10    Objective   Received from PT session   Hernandez received therapeutic exercises for 23 minutes including:  -upper body ergonometer on level 2.8; completed for 10 minutes switching directions mid way. Focus on postural control and breathing techniques with exhale with exertion of force. MET average 2.1     seated at table top:   -issued HEP for intrinsic strength with peach putty for right with teachback x5 reps each (See attached handout)    Hernandez participated in dynamic functional therapeutic activities to improve functional performance for 31 minutes, including:  Re-assessment:     Functional Status      Functional Mobility:  Bed mobility: Mod I  Roll to left: Mod I  Roll to  right: Mod I  Supine to sit: Mod I  Sit to supine: Mod I  Transfers to bed: Mod I  Transfers to toilet: Mod I  Car transfers: Mod I     ADL's:  Feeding: Mod I  Grooming: Mod I  Hygiene: Mod I  Upper Body Dressing: Mod I  Lower Body Dressing: Mod I  Toileting: Mod I  Bathing: Mod I- standing      IADL's:   Homecare: Mod I  Cooking: Mod I  Laundry: Mod I  Use of telephone: Mod I  Money management: Mod I  Medication management: Mod I  Handwriting:Mod I- use of left hand primarily   Technology Use:Mod I     Comments: management at [facility] completes maintenance      Objective   Cognitive Exam:  Oriented: Person, Place, Time, and Situation  Behaviors: Cooperative  Follows Commands/attention: Follows multistep  commands  Communication: clear/fluent; hyper verbal      Visual/Perceptual:  Tracking: impaired nystagmus at end ranges all directions  Saccades: impaired delayed all directions    Acuity: has bifocals  Convergence: impaired; >10 cm  Nystagmus: present    Comments: reported he is scheduled to see a neuro ophthalmologist   Additional: pt with exotropia of right eye/difficulty shifting past midline      Physical Exam:  Postural examination/scapula alignment: Rounded shoulder and Head forward  Joint integrity: Firm end feeling  Skin integrity: intact  Edema: none noted          Joint Evaluation: bilateral upper extremity AROM WFL     Fist: normal; hyper mobile for left 4th digit      Strength: 5/5 throughout bilateral upper extremities       Strength: (ROBERT Dynamometer in lbs.) Position II:       10/14/2022 10/14/2022 12/2/22 12/2/22     Right Left Right Left   Rung II 66.2 # 84.2 # 67.4# 88.1#   Norms for  Strength 60-69  Male: Right Left   63 lbs 56 lbs      Pinch Strength (Measured in psi)       10/14/2022 10/14/2022 12/2/22 12/2/22     Right Left Right Left   Key Pinch 15 psi 19.5 psi 15.2 psi 19.0 psi   3pt Pinch 15 psi 21 psi 12.2 psi 16.8 psi   2pt Pinch 12 psi 14 psi 8.6 psi 12.6 psi      Fine Motor  Coordination: 9 Hole Peg Test     Right   10/14/2022 Left   10/14/2022 Right  12/2/22 Left  12/2/22                1:25.8 min              33.7 s 1:12.7 min  38.0 s      Box and Block  Right   10/14/2022 Left   10/14/2022 Right  12/2/22 Left  12/2/22              26              30 27 35    12/2: left hand utilized on right forearm as stabilizer for both fine motor assessments    Gross motor coordination:   RICHARD (Rapid Alternating Movements): delayed right - mild  Finger to Nose (3 times): dysmetria for right   Finger Flicks (coordination moving from digit flexion to digit extension): delayed right      Tone:  Modified Lexa Scale:   0 - No increase in muscle tone     Sensation:  Mace reports no complaints or changes      Balance:   Static Sitting- GOOD-: Takes MODERATE challenges from all directions but inconsistently  Dynamic Sitting- GOOD-: Takes MODERATE challenges from all directions but inconsistently  Static Standing - FAIR-: Maintains without assist but inconsistent   Dynamic Standing - defer to PT for details      Functional mobility:   Contact guard assist with no AD and with rolling walker - 1 LOB with exit of elevator with need for min(A) for recovering  -contact guard assist with RW from gym to car      Endurance Deficit: mild      Home Exercises and Education Provided   Education provided:   - Benson-Maggie glove for tremor managment  - information for evaluation and implementation   - use of cart at condo to bring up groceries in 1 trip from car   - edu on spoon method for energy conservation   - points of stability for tremor managment   - wrist weights for proprioceptive input   - role of Occupational Therapy in care, goals for Occupational Therapy for this plan of care, scheduling   - Progress towards goals     Written Home Exercises Provided: yes, demo understanding within session     See EMR under Patient Instructions for exercises provided 10/27/2022: Wire grocery cart; energy conservation;  spoon method; wrist weights   11/7: MS Society Stretching Program -- pages 11-13; 18-20  11/11: research article for weighted gloves to aid in tremor managment  and reduction   12/2: resistive therapy putty HEP     Assessment   Re-assessment completed today following physical therapy exercises. He demo mild right coordination gains on this date meeting short term goal - he demo an 13.1 s improvement. HEP issued on this date for intrinsic strengthening with need for reinforcement for carry over at home.     Hernandez is progressing well towards his goals and there are no updates to goals at this time. Pt prognosis is Good.     Pt will continue to benefit from skilled outpatient occupational therapy to address the deficits listed in the problem list on initial evaluation provide pt/family education and to maximize pt's level of independence in the home and community environment.     Anticipated barriers to occupational therapy: insight ; limited carry over     Pt's spiritual, cultural and educational needs considered and pt agreeable to plan of care and goals.    Goals:  Long Term Goals:  Time frame: 8 weeks  - Pt will decrease time on 9HPT by 15 seconds right hand to improve fine motor speed and coordination needed to perform bimanual daily tasks and activities. Ongoing; 13.1 s  - Pt will complete Box and Blocks Test with PING, transferring 28 blocks, to demonstrate improved gross manual coordination needed for ADL and IADL tasks. Not met; ongoing - 27 on 12/2  - Pt will be independent with Home Exercise Program (HEP)/Home Activity Program (HAP) to promote long term maintenance of progress made in therapy. Ongoing   - Pt will demonstrate consistent use of compensatory strategies/adaptive equipment during bimanual/fine motor tasks to maximize UE control. Ongoing   - Pt will improve FOTO limitation score to less than or equal to 38% for improved self perception of functional performance with daily activities. Not met; 48% on  11/14     Short Term Goals:  Time frame: 4 weeks  - Pt will decrease time on 9HPT by 10 seconds right hand to improve fine motor speed and coordination needed to perform bimanual daily tasks and activities. Met by 13.1 s on 12/2  - Pt will demonstrate knowledge and use of energy conservation and work simplification strategies during ADL and IADL tasks. Ongoing   - Pt will demonstrate consistent use of 3 point stabilization technique during bimanual/fine motor tasks to maximize UE control. Ongoing; inconsistent    - Pt will be independent with Home Exercise Program (HEP)/Home Activity Program (HAP) and report at least 50% compliance. Ongoing      Goals to be adjusted and added within plan of care as appropriate.      The following goals were discussed with the patient and patient is in agreement with them as to be addressed in the treatment plan.     Plan   Certification Period/Plan of care expiration: 10/14/2022 to 12/9/2022.     Outpatient Occupational Therapy 2 times weekly for 8 weeks to include the following interventions: Neuromuscular Re-ed, Patient Education, Self Care, Therapeutic Activities, and Therapeutic Exercise.     Updates/Grading for next session: wrap up plan of care    LIANE Siu 12/2/2022

## 2022-12-02 ENCOUNTER — CLINICAL SUPPORT (OUTPATIENT)
Dept: REHABILITATION | Facility: HOSPITAL | Age: 62
End: 2022-12-02
Attending: STUDENT IN AN ORGANIZED HEALTH CARE EDUCATION/TRAINING PROGRAM
Payer: MEDICARE

## 2022-12-02 DIAGNOSIS — R26.89 IMBALANCE: ICD-10-CM

## 2022-12-02 DIAGNOSIS — R27.8 COORDINATION IMPAIRMENTS: Primary | ICD-10-CM

## 2022-12-02 DIAGNOSIS — R26.9 GAIT ABNORMALITY: Primary | ICD-10-CM

## 2022-12-02 PROCEDURE — 97110 THERAPEUTIC EXERCISES: CPT | Mod: PO

## 2022-12-02 PROCEDURE — 97530 THERAPEUTIC ACTIVITIES: CPT | Mod: PO

## 2022-12-02 PROCEDURE — 97110 THERAPEUTIC EXERCISES: CPT | Mod: PO,CQ

## 2022-12-02 NOTE — PROGRESS NOTES
"  [facility]   Physical Therapy Treatment Note     Name: Hernandez Hernandez  Clinic Number: [id number]    Therapy Diagnosis:   Encounter Diagnoses   Name Primary?    Gait abnormality Yes    Imbalance        Physician: Danica Anne MD    Visit Date: 12/2/2022    Physician Orders: PT Eval and Treat   Medical Diagnosis from Referral:   G35 (ICD-10-CM) - Multiple sclerosis   R25.1 (ICD-10-CM) - Tremor   Evaluation Date: 10/13/2022  Authorization Period Expiration: 12/31/22  Plan of Care Expiration: 01/05/23  Plan of Care Certification Period: 10/13/22 - 01/05/23   Visit # / Visits authorized: 10/12 (+ initial eval)    PTA Visit #: 1/5     Time In: 10:00  Time Out: 10:45  Total Billable Time: 45 minutes    Precautions: Fall, Standard    SUBJECTIVE     Pt reports: " My other shoes  the floor to much."   He is not compliant with HEP.  Response to previous treatment: no adverse effects  Functional change: ongoing    Pain: 0/10  Location: N/A     OBJECTIVE     No objective measures taken at this visit.    Treatment     Hernandez received the treatments listed below:      therapeutic exercises to develop strength, endurance, ROM, flexibility, posture, and core stabilization for 45 minutes including:    10 minutes on Sci Fit recumbent stepper.  B UE/B LE on level 3.0    2 x 30 sec of B LE  ankle dorsiflexion stretching in long sitting    2 x 10 supine R SLR with attempt to dorsiflex ankle throughout; pt able to partially correct R hip ER    Prone knee flexion - 1 x 15, 1 x 20 L side with 2# ankle weight; 3 x 10 R side no weight, no assistance   - decreased performance speed    Time above also utilized for rest breaks between exercises.    therapeutic activities to improve functional performance for 0 minutes, including:    neuromuscular re-education activities to improve: Balance, Coordination, Kinesthetic, Sense, Proprioception, and Posture for 0 minutes. The following activities were included:    gait " training to improve functional mobility and safety for 0 minutes, including:       Patient Education and Home Exercises     Home Exercises Provided and Patient Education Provided     Education provided:   - 10/25/22: initial HEP provided with hooklying clams with green theraband and supine bridges  - 11/7/22: added prone knee flexion, ankle DF    Written Home Exercises Provided: yes. Exercises were reviewed and Hernandez was able to demonstrate them prior to the end of the session.  Hernandez demonstrated good  understanding of the education provided. See EMR under Patient Instructions for exercises provided during therapy sessions    ASSESSMENT     Hernandez tolerated today's session well and did not have any problems noted.  Hernandez cont with stretching, endurance and ankle and hamstring strengthening.   Continue with POC.     Hernandez Is progressing well towards his goals.   Pt prognosis is Good.     Pt will continue to benefit from skilled outpatient physical therapy to address the deficits listed in the problem list box on initial evaluation, provide pt/family education and to maximize pt's level of independence in the home and community environment.     Pt's spiritual, cultural and educational needs considered and pt agreeable to plan of care and goals.     Anticipated barriers to physical therapy: progressive nature of condition, cognitive deficits    Goals:  Short Term Goals: 4 weeks  Pt to be introduced to HEP and report > 50% compliance. progressing  Pt to improve gross BLE strength by > 1/3 muscle grade on MMT. progressing  Pt to improve TUG score to 13 sec with SPV and appropriate orthoses and/or AD to decrease risk of falls. progressing  Pt to improve 30 sec chair rise score to 12.5 reps to show improved functional lower extremity endurance. progressing  Pt to improve condition 3 on the GST to 5 seconds for improved balance in low vision environments with NBOS.  progressing  Pt to improve condition 6 on the GST to 8 seconds  for improved balance in low vision environments on unstable surface.  progressing     Long Term Goals: 8 weeks   Pt to improve R ankle flexibility to WNL to improve fitting of R AFO. ongoing  Pt to improve gross B hip strength by > 2/3 muscle grade on MMT. ongoing  Pt to improve TUG score to 12 sec with SPV and appropriate orthoses and/or AD to decrease risk of falls. ongoing  Pt to improve 30 sec chair rise score to 12.5 reps to show improved functional lower extremity endurance. ongoing  Pt to improve condition 3 on the GST to 10 seconds for improved balance in low vision environments with NBOS.  ongoing  Pt to improve condition 6 on the GST to 15 seconds for improved balance in low vision environments on unstable surface. ongoing    PLAN     Continue with POC.    Domonique Arias, PTA

## 2022-12-07 ENCOUNTER — CLINICAL SUPPORT (OUTPATIENT)
Dept: REHABILITATION | Facility: HOSPITAL | Age: 62
End: 2022-12-07
Attending: FAMILY MEDICINE
Payer: MEDICARE

## 2022-12-07 ENCOUNTER — TELEPHONE (OUTPATIENT)
Dept: NEUROLOGY | Facility: CLINIC | Age: 62
End: 2022-12-07
Payer: MEDICARE

## 2022-12-07 DIAGNOSIS — R26.9 GAIT ABNORMALITY: Primary | ICD-10-CM

## 2022-12-07 DIAGNOSIS — R27.8 COORDINATION IMPAIRMENTS: Primary | ICD-10-CM

## 2022-12-07 DIAGNOSIS — R26.89 IMBALANCE: ICD-10-CM

## 2022-12-07 PROCEDURE — 97110 THERAPEUTIC EXERCISES: CPT | Mod: PO

## 2022-12-07 PROCEDURE — 97530 THERAPEUTIC ACTIVITIES: CPT | Mod: PO

## 2022-12-07 NOTE — TELEPHONE ENCOUNTER
"Spoke with patient to let him know his appt on 12/16 with  will be virtual. Patient stated " doing virtual is unprofessional and is constantly making the patient do extra work" Patient also stated " should he bring a baby gift since he has to virtual he says he will probably still have a co pay and virtual is not a real appointment he wont be able to discuss results through a phone because he can not look at provider eyes to discuss results. Patient was very rude constantly repeating " virtual does not help the patient it makes the patient go through loops " so I scheduled him in person on 4/3 as he requested  "

## 2022-12-07 NOTE — PROGRESS NOTES
"  [facility]   Occupational Therapy Neurological Rehabilitation   Discharge   Name: Hernandez Hernandez  MRN: [id number]  Today's Date: 12/7/2022    Therapy Diagnosis:   Encounter Diagnosis   Name Primary?    Coordination impairments Yes     Physician: Danica Anne MD  Physician Orders: Neuro Program      Medical Diagnosis: G35 (ICD-10-CM) - Multiple sclerosis R25.1 (ICD-10-CM) - Tremor   Recent Surgery: * No surgery found *  Evaluation Date: 10/14/2022  Plan of Care Expiration Period: 12/9/2022  Insurance Authorization period Expiration: 12/31/2022  Date of Return to MD: 12/16/2022 Neurology - [facility]  FOTO: 3/3     Visit # / Visits Authorized: 11 / 12 (+evaluation)  Time In: 11:50  Time Out: 12:30  Total Billable Time: 40 minutes    Precautions:   Standard and Fall    Subjective   Pt reports: "the counter bit me" - left ear   Not compliant with HEP at this time- not using purchased weight for tremor managment    Response to previous treatment: no complaints  Functional change: ongoing ; in an MS support group on [employer]  Patient's Goals for Occupational Therapy: to learn new ways to fine tune his functional indep and safety     Pain upon arrival: 0/10  Location:n/a  Pain at cessation of session: 0/10    Objective   See note for 12/2 for other objective measures     Treatment   Mace participated in dynamic functional therapeutic activities to improve functional performance for 40  minutes, including:  -left hand to complete Final FOTO completion     Fine Motor Coordination: 9 Hole Peg Test     Right   10/14/2022 Left   10/14/2022 Right  12/2/22 Left  12/2/22 Right  12/7/22 Left  12/7/22                1:25.8 min              33.7 s 1:12.7 min  38.0 s 1:20.1 min 30.2 s      Box and Block  Right   10/14/2022 Left   10/14/2022 Right  12/2/22 Left  12/2/22 Right  12/7/22 Left  12/7/22              26              30 27 35 29 35    12/2: left hand utilized on right forearm as stabilizer for both fine motor " assessments    -re edu on all education and HEPs provided with verbalized understanding  -assisted to car with rolling walker and contact guard assist     Home Exercises and Education Provided   Education provided:   - Readi-Steadi glove for tremor managment  - information for evaluation and implementation   - use of cart at condo to bring up groceries in 1 trip from car   - edu on spoon method for energy conservation   - points of stability for tremor managment   - wrist weights for proprioceptive input   - role of Occupational Therapy in care, goals for Occupational Therapy for this plan of care, scheduling   - Progress towards goals     Written Home Exercises Provided: yes, demo understanding within session     See EMR under Patient Instructions for exercises provided 10/27/2022: Wire grocery cart; energy conservation; spoon method; wrist weights   11/7/22: MS Society Stretching Program -- pages 11-13; 18-20  11/11/22: research article for weighted gloves to aid in tremor managment  and reduction   12/2/22: resistive therapy putty HEP     Assessment   Hernandez's progress was limited throughout plan of care due to his limited carry over for  education and home exercise/activity programs at home. Pt with poor insight into making new modifications and adaptations at home and seems content with continuing his ADLs/IADLs as he has been completing for decades.     Anticipated barriers to occupational therapy: insight ; limited carry over     Pt's spiritual, cultural and educational needs considered and pt agreeable to plan of care and goals.    Goals:  Long Term Goals:  Time frame: 8 weeks  - Pt will decrease time on 9HPT by 15 seconds right hand to improve fine motor speed and coordination needed to perform bimanual daily tasks and activities. Inconsistent ; 13.1 s on 12/2 and 5 s decreased on 12/7  - Pt will complete Box and Blocks Test with RUESTHER, transferring 28 blocks, to demonstrate improved gross manual coordination  needed for ADL and IADL tasks. Met 12/7 (29)  - Pt will be independent with Home Exercise Program (HEP)/Home Activity Program (HAP) to promote long term maintenance of progress made in therapy. Inconsistent   - Pt will demonstrate consistent use of compensatory strategies/adaptive equipment during bimanual/fine motor tasks to maximize UE control. Inconsistent   - Pt will improve FOTO limitation score to less than or equal to 38% for improved self perception of functional performance with daily activities. Not met; 42% on 12/7     Short Term Goals:  Time frame: 4 weeks  - Pt will decrease time on 9HPT by 10 seconds right hand to improve fine motor speed and coordination needed to perform bimanual daily tasks and activities. Met by 13.1 s on 12/2  - Pt will demonstrate knowledge and use of energy conservation and work simplification strategies during ADL and IADL tasks. Ongoing   - Pt will demonstrate consistent use of 3 point stabilization technique during bimanual/fine motor tasks to maximize UE control. inconsistent    - Pt will be independent with Home Exercise Program (HEP)/Home Activity Program (HAP) and report at least 50% compliance. Inconsistent      Plan   Pt discharged from OT at this time due to minimal progress and carry over.     LIANE Siu 12/7/2022

## 2022-12-07 NOTE — PROGRESS NOTES
[facility]   Physical Therapy Treatment Note     Name: Hernandez Hernandez  Clinic Number: [id number]    Therapy Diagnosis:   Encounter Diagnoses   Name Primary?    Gait abnormality Yes    Imbalance      Physician: Danica Anne MD    Visit Date: 2022    Physician Orders: PT Eval and Treat   Medical Diagnosis from Referral:   G35 (ICD-10-CM) - Multiple sclerosis   R25.1 (ICD-10-CM) - Tremor   Evaluation Date: 10/13/2022  Authorization Period Expiration: 22  Plan of Care Expiration: 23  Plan of Care Certification Period: 10/13/22 - 23   Visit # / Visits authorized:  (+ initial eval)    PTA Visit #: 0/5     Time In: 1105 (pt not checked in)  Time Out: 1145  Total Billable Time: 40 minutes    Precautions: Fall, Standard    SUBJECTIVE     Pt reports: he fell the other day   He is semi-compliant with HEP.  Response to previous treatment: no adverse effects  Functional change: ongoing    Pain: 0/10  Location: N/A     OBJECTIVE     No objective measurements taken today.    Treatment     Hernandez received the treatments listed below:      therapeutic exercises to develop strength, endurance, ROM, flexibility, posture, and core stabilization for 40 minutes includin minutes on Sci Fit recumbent stepper.  B UE/B LE on level 3.0    2 x 10, 2 sec hold R ankle dorsiflexion in long sitting    Prone knee flexion - 2 x 15 with 3# ankle weight, 1 x 10 R side 1# ankle weight + 1 x 15 R side 1.5# ankle weight  - decreased performance speed    Time above also utilized for rest breaks between exercises.    therapeutic activities to improve functional performance for 0 minutes, including:    neuromuscular re-education activities to improve: Balance, Coordination, Kinesthetic, Sense, Proprioception, and Posture for 0 minutes. The following activities were included:    gait training to improve functional mobility and safety for 0 minutes, including:       Patient Education and Home  "Exercises     Home Exercises Provided and Patient Education Provided     Education provided:   - 10/25/22: initial HEP provided with hooklying clams with green theraband and supine bridges  - 11/7/22: added prone knee flexion, ankle DF    Written Home Exercises Provided: yes. Exercises were reviewed and Hernandez was able to demonstrate them prior to the end of the session.  Hernandez demonstrated good  understanding of the education provided. See EMR under Patient Instructions for exercises provided during therapy sessions    ASSESSMENT     Hernandez tolerated today's session well, tolerating progression of prone knee flexion bilaterally. Pt is difficult to follow in conversation regarding his opinion about therapy, mentioning for the first time since initial eval that he is "all for" therapy. Despite this newly stated opinion, the pt continues to state he has made no physical/functional improvements even though he verbalizes understanding of visible strength improvements seen by PT (through progression of knee flexion strengthening, for example).      Hernandez Is progressing well towards his goals.   Pt prognosis is Good.     Pt will continue to benefit from skilled outpatient physical therapy to address the deficits listed in the problem list box on initial evaluation, provide pt/family education and to maximize pt's level of independence in the home and community environment.     Pt's spiritual, cultural and educational needs considered and pt agreeable to plan of care and goals.     Anticipated barriers to physical therapy: progressive nature of condition, cognitive deficits    Goals:  Short Term Goals: 4 weeks  Pt to be introduced to HEP and report > 50% compliance. progressing  Pt to improve gross BLE strength by > 1/3 muscle grade on MMT. progressing  Pt to improve TUG score to 13 sec with SPV and appropriate orthoses and/or AD to decrease risk of falls. progressing  Pt to improve 30 sec chair rise score to 12.5 reps to show " improved functional lower extremity endurance. progressing  Pt to improve condition 3 on the GST to 5 seconds for improved balance in low vision environments with NBOS.  progressing  Pt to improve condition 6 on the GST to 8 seconds for improved balance in low vision environments on unstable surface.  progressing     Long Term Goals: 8 weeks   Pt to improve R ankle flexibility to WNL to improve fitting of R AFO. MET 11/14/22  Pt to improve gross B hip strength by > 2/3 muscle grade on MMT. ongoing  Pt to improve TUG score to 12 sec with SPV and appropriate orthoses and/or AD to decrease risk of falls. ongoing  Pt to improve 30 sec chair rise score to 12.5 reps to show improved functional lower extremity endurance. ongoing  Pt to improve condition 3 on the GST to 10 seconds for improved balance in low vision environments with NBOS.  ongoing  Pt to improve condition 6 on the GST to 15 seconds for improved balance in low vision environments on unstable surface. ongoing    PLAN     Reassess next visit.     Shayne Anaya, PT

## 2022-12-12 ENCOUNTER — DOCUMENTATION ONLY (OUTPATIENT)
Dept: REHABILITATION | Facility: HOSPITAL | Age: 62
End: 2022-12-12

## 2022-12-12 NOTE — PROGRESS NOTES
Documentation Only/No Show    Patient: Hernandez Hernandez  Date of Session: 12/12/2022  MRN: [id number]  Hernandez Hernandez did not attend his scheduled physical therapy appointment today. Hernandez Hernandez did not call to cancel nor reschedule. This is the 1st appointment that the patient has not attended. No charges have been posted today.     Shayne Anaya, PT  12/12/2022

## 2022-12-21 ENCOUNTER — DOCUMENTATION ONLY (OUTPATIENT)
Dept: REHABILITATION | Facility: HOSPITAL | Age: 62
End: 2022-12-21
Payer: MEDICARE

## 2022-12-21 NOTE — PROGRESS NOTES
PHYSICAL THERAPY DISCHARGE SUMMARY     Total Visits: 12  Missed Visits: 1  Cancelled Visits: all remaining visits    Status Towards Goals Met: 0/6 STGs met, 1/6 LTGs met    Goals:     Pt to be introduced to HEP and report > 50% compliance. progressing  Pt to improve gross BLE strength by > 1/3 muscle grade on MMT. progressing  Pt to improve TUG score to 13 sec with SPV and appropriate orthoses and/or AD to decrease risk of falls. progressing  Pt to improve 30 sec chair rise score to 12.5 reps to show improved functional lower extremity endurance. progressing  Pt to improve condition 3 on the GST to 5 seconds for improved balance in low vision environments with NBOS.  progressing  Pt to improve condition 6 on the GST to 8 seconds for improved balance in low vision environments on unstable surface.  progressing     Long Term Goals: 8 weeks   Pt to improve R ankle flexibility to WNL to improve fitting of R AFO. MET 11/14/22  Pt to improve gross B hip strength by > 2/3 muscle grade on MMT. ongoing  Pt to improve TUG score to 12 sec with SPV and appropriate orthoses and/or AD to decrease risk of falls. ongoing  Pt to improve 30 sec chair rise score to 12.5 reps to show improved functional lower extremity endurance. ongoing  Pt to improve condition 3 on the GST to 10 seconds for improved balance in low vision environments with NBOS.  ongoing  Pt to improve condition 6 on the GST to 15 seconds for improved balance in low vision environments on unstable surface. ongoing      Goals Not achieved and why:   pt non-compliant with PT recommendations and HEP.      Discharge reason: Patient self discharge and Patient has maximized benefit from PT at this time    PLAN   This patient is discharged from Outpatient Physical Therapy Services.     Shayne Anaya, PT  12/21/2022

## 2023-02-20 ENCOUNTER — PATIENT MESSAGE (OUTPATIENT)
Dept: NEUROLOGY | Facility: CLINIC | Age: 63
End: 2023-02-20
Payer: MEDICARE

## 2023-02-20 ENCOUNTER — PATIENT MESSAGE (OUTPATIENT)
Dept: PSYCHIATRY | Facility: CLINIC | Age: 63
End: 2023-02-20
Payer: MEDICARE

## 2023-04-03 ENCOUNTER — CLINICAL SUPPORT (OUTPATIENT)
Dept: INFECTIOUS DISEASES | Facility: CLINIC | Age: 63
End: 2023-04-03
Payer: MEDICARE

## 2023-04-03 ENCOUNTER — OFFICE VISIT (OUTPATIENT)
Dept: NEUROLOGY | Facility: CLINIC | Age: 63
End: 2023-04-03
Payer: MEDICARE

## 2023-04-03 ENCOUNTER — LAB VISIT (OUTPATIENT)
Dept: LAB | Facility: HOSPITAL | Age: 63
End: 2023-04-03
Attending: STUDENT IN AN ORGANIZED HEALTH CARE EDUCATION/TRAINING PROGRAM
Payer: MEDICARE

## 2023-04-03 VITALS
DIASTOLIC BLOOD PRESSURE: 67 MMHG | WEIGHT: 170.06 LBS | HEART RATE: 75 BPM | HEIGHT: 66 IN | BODY MASS INDEX: 27.33 KG/M2 | SYSTOLIC BLOOD PRESSURE: 110 MMHG

## 2023-04-03 DIAGNOSIS — R26.9 GAIT DISTURBANCE: ICD-10-CM

## 2023-04-03 DIAGNOSIS — R53.83 FATIGUE, UNSPECIFIED TYPE: ICD-10-CM

## 2023-04-03 DIAGNOSIS — G35 MULTIPLE SCLEROSIS: Chronic | ICD-10-CM

## 2023-04-03 DIAGNOSIS — G35 MULTIPLE SCLEROSIS: Primary | Chronic | ICD-10-CM

## 2023-04-03 DIAGNOSIS — Z78.9 IMPAIRED DRIVING SKILLS: ICD-10-CM

## 2023-04-03 LAB
ALBUMIN SERPL BCP-MCNC: 4.4 G/DL (ref 3.5–5.2)
ALP SERPL-CCNC: 62 U/L (ref 55–135)
ALT SERPL W/O P-5'-P-CCNC: 31 U/L (ref 10–44)
ANION GAP SERPL CALC-SCNC: 8 MMOL/L (ref 8–16)
AST SERPL-CCNC: 21 U/L (ref 10–40)
BASOPHILS # BLD AUTO: 0.07 K/UL (ref 0–0.2)
BASOPHILS NFR BLD: 1 % (ref 0–1.9)
BILIRUB SERPL-MCNC: 0.6 MG/DL (ref 0.1–1)
BUN SERPL-MCNC: 9 MG/DL (ref 6–20)
CALCIUM SERPL-MCNC: 9.6 MG/DL (ref 8.7–10.5)
CHLORIDE SERPL-SCNC: 104 MMOL/L (ref 95–110)
CO2 SERPL-SCNC: 27 MMOL/L (ref 23–29)
CREAT SERPL-MCNC: 0.8 MG/DL (ref 0.5–1.4)
DIFFERENTIAL METHOD: NORMAL
EOSINOPHIL # BLD AUTO: 0.2 K/UL (ref 0–0.5)
EOSINOPHIL NFR BLD: 2.4 % (ref 0–8)
ERYTHROCYTE [DISTWIDTH] IN BLOOD BY AUTOMATED COUNT: 12.2 % (ref 11.5–14.5)
EST. GFR  (NO RACE VARIABLE): >60 ML/MIN/1.73 M^2
GLUCOSE SERPL-MCNC: 89 MG/DL (ref 70–110)
HBV CORE AB SERPL QL IA: NORMAL
HBV SURFACE AG SERPL QL IA: NORMAL
HCT VFR BLD AUTO: 47.1 % (ref 40–54)
HGB BLD-MCNC: 15.3 G/DL (ref 14–18)
IGA SERPL-MCNC: 348 MG/DL (ref 40–350)
IGG SERPL-MCNC: 1159 MG/DL (ref 650–1600)
IGM SERPL-MCNC: 33 MG/DL (ref 50–300)
IMM GRANULOCYTES # BLD AUTO: 0.03 K/UL (ref 0–0.04)
IMM GRANULOCYTES NFR BLD AUTO: 0.4 % (ref 0–0.5)
LYMPHOCYTES # BLD AUTO: 2 K/UL (ref 1–4.8)
LYMPHOCYTES NFR BLD: 27.6 % (ref 18–48)
MCH RBC QN AUTO: 30.4 PG (ref 27–31)
MCHC RBC AUTO-ENTMCNC: 32.5 G/DL (ref 32–36)
MCV RBC AUTO: 94 FL (ref 82–98)
MONOCYTES # BLD AUTO: 0.7 K/UL (ref 0.3–1)
MONOCYTES NFR BLD: 10.1 % (ref 4–15)
NEUTROPHILS # BLD AUTO: 4.3 K/UL (ref 1.8–7.7)
NEUTROPHILS NFR BLD: 58.5 % (ref 38–73)
NRBC BLD-RTO: 0 /100 WBC
PLATELET # BLD AUTO: 275 K/UL (ref 150–450)
PMV BLD AUTO: 9.4 FL (ref 9.2–12.9)
POTASSIUM SERPL-SCNC: 4.3 MMOL/L (ref 3.5–5.1)
PROT SERPL-MCNC: 7.6 G/DL (ref 6–8.4)
RBC # BLD AUTO: 5.03 M/UL (ref 4.6–6.2)
SODIUM SERPL-SCNC: 139 MMOL/L (ref 136–145)
WBC # BLD AUTO: 7.36 K/UL (ref 3.9–12.7)

## 2023-04-03 PROCEDURE — 99999 PR PBB SHADOW E&M-EST. PATIENT-LVL III: CPT | Mod: PBBFAC,HCWC,, | Performed by: STUDENT IN AN ORGANIZED HEALTH CARE EDUCATION/TRAINING PROGRAM

## 2023-04-03 PROCEDURE — 86704 HEP B CORE ANTIBODY TOTAL: CPT | Mod: HCWC | Performed by: STUDENT IN AN ORGANIZED HEALTH CARE EDUCATION/TRAINING PROGRAM

## 2023-04-03 PROCEDURE — 90739 HEPB VACC 2/4 DOSE ADULT IM: CPT | Mod: HCWC,S$GLB,, | Performed by: INTERNAL MEDICINE

## 2023-04-03 PROCEDURE — 3008F BODY MASS INDEX DOCD: CPT | Mod: HCWC,CPTII,S$GLB, | Performed by: STUDENT IN AN ORGANIZED HEALTH CARE EDUCATION/TRAINING PROGRAM

## 2023-04-03 PROCEDURE — 99215 OFFICE O/P EST HI 40 MIN: CPT | Mod: HCWC,S$GLB,, | Performed by: STUDENT IN AN ORGANIZED HEALTH CARE EDUCATION/TRAINING PROGRAM

## 2023-04-03 PROCEDURE — 90750 ZOSTER RECOMBINANT VACCINE: ICD-10-PCS | Mod: HCWC,S$GLB,, | Performed by: INTERNAL MEDICINE

## 2023-04-03 PROCEDURE — 82784 ASSAY IGA/IGD/IGG/IGM EACH: CPT | Mod: HCWC | Performed by: STUDENT IN AN ORGANIZED HEALTH CARE EDUCATION/TRAINING PROGRAM

## 2023-04-03 PROCEDURE — 85025 COMPLETE CBC W/AUTO DIFF WBC: CPT | Mod: HCWC | Performed by: STUDENT IN AN ORGANIZED HEALTH CARE EDUCATION/TRAINING PROGRAM

## 2023-04-03 PROCEDURE — 90739 HEPATITIS B (RECOMBINANT) ADJUVANTED, 2 DOSE: ICD-10-PCS | Mod: HCWC,S$GLB,, | Performed by: INTERNAL MEDICINE

## 2023-04-03 PROCEDURE — 3074F SYST BP LT 130 MM HG: CPT | Mod: HCWC,CPTII,S$GLB, | Performed by: STUDENT IN AN ORGANIZED HEALTH CARE EDUCATION/TRAINING PROGRAM

## 2023-04-03 PROCEDURE — 90471 IMMUNIZATION ADMIN: CPT | Mod: HCWC,S$GLB,, | Performed by: INTERNAL MEDICINE

## 2023-04-03 PROCEDURE — 3078F PR MOST RECENT DIASTOLIC BLOOD PRESSURE < 80 MM HG: ICD-10-PCS | Mod: HCWC,CPTII,S$GLB, | Performed by: STUDENT IN AN ORGANIZED HEALTH CARE EDUCATION/TRAINING PROGRAM

## 2023-04-03 PROCEDURE — 80053 COMPREHEN METABOLIC PANEL: CPT | Mod: HCWC | Performed by: STUDENT IN AN ORGANIZED HEALTH CARE EDUCATION/TRAINING PROGRAM

## 2023-04-03 PROCEDURE — G0010 ADMIN HEPATITIS B VACCINE: HCPCS | Mod: 59,HCWC,S$GLB, | Performed by: INTERNAL MEDICINE

## 2023-04-03 PROCEDURE — 1159F PR MEDICATION LIST DOCUMENTED IN MEDICAL RECORD: ICD-10-PCS | Mod: HCWC,CPTII,S$GLB, | Performed by: STUDENT IN AN ORGANIZED HEALTH CARE EDUCATION/TRAINING PROGRAM

## 2023-04-03 PROCEDURE — G0010 PNEUMOCOCCAL POLYSACCHARIDE VACCINE 23-VALENT =>2YO SQ IM: ICD-10-PCS | Mod: 59,HCWC,S$GLB, | Performed by: INTERNAL MEDICINE

## 2023-04-03 PROCEDURE — 99999 PR PBB SHADOW E&M-EST. PATIENT-LVL III: ICD-10-PCS | Mod: PBBFAC,HCWC,, | Performed by: STUDENT IN AN ORGANIZED HEALTH CARE EDUCATION/TRAINING PROGRAM

## 2023-04-03 PROCEDURE — 3008F PR BODY MASS INDEX (BMI) DOCUMENTED: ICD-10-PCS | Mod: HCWC,CPTII,S$GLB, | Performed by: STUDENT IN AN ORGANIZED HEALTH CARE EDUCATION/TRAINING PROGRAM

## 2023-04-03 PROCEDURE — 87340 HEPATITIS B SURFACE AG IA: CPT | Mod: HCWC | Performed by: STUDENT IN AN ORGANIZED HEALTH CARE EDUCATION/TRAINING PROGRAM

## 2023-04-03 PROCEDURE — 3078F DIAST BP <80 MM HG: CPT | Mod: HCWC,CPTII,S$GLB, | Performed by: STUDENT IN AN ORGANIZED HEALTH CARE EDUCATION/TRAINING PROGRAM

## 2023-04-03 PROCEDURE — 1159F MED LIST DOCD IN RCRD: CPT | Mod: HCWC,CPTII,S$GLB, | Performed by: STUDENT IN AN ORGANIZED HEALTH CARE EDUCATION/TRAINING PROGRAM

## 2023-04-03 PROCEDURE — 99215 PR OFFICE/OUTPT VISIT, EST, LEVL V, 40-54 MIN: ICD-10-PCS | Mod: HCWC,S$GLB,, | Performed by: STUDENT IN AN ORGANIZED HEALTH CARE EDUCATION/TRAINING PROGRAM

## 2023-04-03 PROCEDURE — 90732 PPSV23 VACC 2 YRS+ SUBQ/IM: CPT | Mod: HCWC,S$GLB,, | Performed by: INTERNAL MEDICINE

## 2023-04-03 PROCEDURE — 90750 HZV VACC RECOMBINANT IM: CPT | Mod: HCWC,S$GLB,, | Performed by: INTERNAL MEDICINE

## 2023-04-03 PROCEDURE — G0009 ADMIN PNEUMOCOCCAL VACCINE: HCPCS | Mod: 59,HCWC,S$GLB, | Performed by: INTERNAL MEDICINE

## 2023-04-03 PROCEDURE — 90732 PNEUMOCOCCAL POLYSACCHARIDE VACCINE 23-VALENT =>2YO SQ IM: ICD-10-PCS | Mod: HCWC,S$GLB,, | Performed by: INTERNAL MEDICINE

## 2023-04-03 PROCEDURE — 3074F PR MOST RECENT SYSTOLIC BLOOD PRESSURE < 130 MM HG: ICD-10-PCS | Mod: HCWC,CPTII,S$GLB, | Performed by: STUDENT IN AN ORGANIZED HEALTH CARE EDUCATION/TRAINING PROGRAM

## 2023-04-03 PROCEDURE — 36415 COLL VENOUS BLD VENIPUNCTURE: CPT | Mod: HCWC | Performed by: STUDENT IN AN ORGANIZED HEALTH CARE EDUCATION/TRAINING PROGRAM

## 2023-04-03 PROCEDURE — G0009 HEPATITIS B (RECOMBINANT) ADJUVANTED, 2 DOSE: ICD-10-PCS | Mod: 59,HCWC,S$GLB, | Performed by: INTERNAL MEDICINE

## 2023-04-03 PROCEDURE — 90471 ZOSTER RECOMBINANT VACCINE: ICD-10-PCS | Mod: HCWC,S$GLB,, | Performed by: INTERNAL MEDICINE

## 2023-04-03 RX ORDER — DALFAMPRIDINE 10 MG/1
10 TABLET, FILM COATED, EXTENDED RELEASE ORAL 2 TIMES DAILY
Qty: 60 TABLET | Refills: 11 | Status: SHIPPED | OUTPATIENT
Start: 2023-04-03 | End: 2023-04-14 | Stop reason: SDUPTHER

## 2023-04-03 NOTE — PROGRESS NOTES
I have reviewed and concur with the NP's history, physical, assessment, and plan.  I have personally interviewed and examined the patient at bedside.  See below addendum for my evaluation and additional findings.    He has PPMS currently with active progression, will start Ocrevus with goals of slowing down disability progression. Symptom management as below    Danica Anne MD, MSc  Attending neurologist       [facility]  Follow Up Patient Visit      Disease Summary     Principle neurological diagnosis: MS     Date of symptom onset: 1998  Date of diagnosis: 1998  Disease type at diagnosis: Progressive  Disease type currently: Progressive, without relapses, but active progression  Previous therapy: Avonex (s/e's) 1999 - 2006  Current therapy: NA, pending to start Ocrevus  Last MRI Brain: 11/1/2022  Last MRI C-spine: 11/1/2022  Last MRI T-spine: 11/1/2022  CSF: NA  JCV status: 10/12/2022: positive (1.34)  Other relevant labs and tests: Vitamin D 12/7/2021: 52     Interval history:     Pt reports to clinic in wheelchair with significant other for follow up for Active Progressive MS     Pt states that he is feeling pretty stable.  He maintains a well balanced diet.  Not exercising regularly.     Pt is walking independently, but is using wheelchair in hospital today.  Denies using cane or walker.  Is no longer doing PT/OT d/t cost.  Endorses daily falls d/t instability and drop foot.     He continues to drive but drives with both feet. Willing to do driving evaluation.      He states that he is good in the morning and fatigue progresses throughout the day depending on activity.  He cannot stand for long periods of time. Pt states that he has provigil, but does not take it daily only takes it when needed.     States that his symptoms/weakness on the Right side more than the left.     Taking vitamin D 5000 IU daily.     Significant other states that pt is having issues with short term memory.  Pt  states that he has adapted with reminders on his phone.     He is interested in starting DMT with Ocrevus.     ROS:     SOCIAL HISTORY  Living arrangements - the patient lives with their family.  Social History     Socioeconomic History    Marital status:     Number of children: 2   Occupational History    Occupation:     Occupation: [employer]    Occupation: currently unempl   Tobacco Use    Smoking status: Never    Smokeless tobacco: Never   Substance and Sexual Activity    Alcohol use: Yes     Alcohol/week: 3.0 standard drinks     Types: 2 Glasses of wine, 1 Cans of beer per week     Comment: occassionally    Drug use: No         REVIEW OF SYMPTOMS 4/3/2023   Do you feel abnormally tired on most days? Yes   Do you have difficulty controlling your bladder?  No   Do you have difficulty controlling your bowels?  No   Do you have frequent muscle cramps, tightness or spasms in your limbs?  No   Do you have new visual symptoms?  No   Do you have worsening difficulty with your memory or thinking? Yes   Do you have worsening symptoms of anxiety or depression?  No   For patients who walk, Do you have more difficulty walking?  Yes   Have you fallen since your last visit?  Yes   For patients who use wheelchairs: Do you have any skin wounds or breakdown? No   Do you have difficulty using your hands?  Yes   Do you have shooting or burning pain? No   If you are sexually active, are you using birth control? Y/N  N/A Not Applicable   Do you often choke when swallowing liquids or solid food?  No   Do you experience worsening symptoms when overheated? Yes   Do you need any new equipment such as a wheelchair, walker or shower chair? Yes   Do you receive co-pay financial assistance for your principal MS medicine? Not Applicable   Would you be interested in participating in an MS research trial in the future? Yes   For patients on Gilenya, Tecfidera, Aubagio, Rituxan, Ocrevus, Tysabri, Lemtrada or Methotrexate, are you aware  "that you should NOT receive live virus vaccines?  Not Applicable   Do you feel you have adequate family/friend support?  Yes   Do you have health insurance?   Yes   Are you currently employed? No   Do you receive SSDI/SSI?  Yes   Do you use marijuana or cannabis products? No   Have you been diagnosed with a urinary tract infection since your last visit here? No   Have you been diagnosed with a respiratory tract infection since your last visit here? No   Have you been to the emergency room since your last visit here? No   Have you been hospitalized since your last visit here?  No         Exam:     Vitals:    04/03/23 1312   BP: 110/67   Pulse: 75   Weight: 77.2 kg (170 lb 1.4 oz)   Height: 5' 6" (1.676 m)          In general, the patient is well nourished and appears to be in no acute distress.    MENTAL STATUS: language is fluent, normal verbal comprehension, attention is normal, patient is alert and oriented x 3, fund of knowlege is appropriate by vocabulary.     CRANIAL NERVE EXAM:  bilateral AB.  Pupils are equal, round, and reactive to light. No facial asymmetry. Facial sensation is intact bilaterally. There is no dysarthria. Uvula is midline, and palate moves symmetrically. Shoulder shrug intact bilaterlly. Tongue protrusion is midline. Hearing is intact to finger rub bilaterally. Neck is supple with full ROM    MOTOR EXAM: Normal bulk and tone throughout UE and LE bilaterally.   No pronator drift; Strength is  5/5 in all groups in the lower extremities and upper extremities with exceptions mentioned below:     Strength:  R deltoid 5/5, L deltoid 5/5  R biceps 5/5, L biceps 5/5  R triceps 5/5, L triceps 5/5  R finger flexors 5/5, L finger flexors 5/5  R finger extensors 5/5, L finger extensors 5/5  R finger abductors 5/5, L finger abductors 5/5  R  hip flexors 4/5, L hip flexors 5/5  R knee extensors 4+/5, L knee extensors 5/5  R knee flexors 4+/5, L knee flexors 5/5  R ankle dorsiflexors 4/5, L ankle " dorsiflexors 5/5  R ankle plantarflexors 4+/5, L ankle plantarflexors 5/5    REFLEXES: 2+ and symmetric throughout in all four extremities except 3+ on Rt patella;  no cross-adductors    SENSORY EXAM: Normal to light touch, vibratory sense moderately decreased at Rt ankle and hand, mildly decreased on Rt knee and wrist.      COORDINATION: dysmetria with Rt FTN, normal on Lt.     GAIT: unsteady, circumduction of Rt leg, high steppage gait    Negative Romberg's    Timed 25 Foot Walk: 10/12/2022 4/3/2023   Did patient wear an AFO? No No   Was assistive device used? No No   Time for 25 Foot Walk (seconds) 6.61 8.34   Time for 25 Foot Walk (seconds) 6.38 8.65         Imaging (personally reviewed):     Reviewed with pt     Results for orders placed during the hospital encounter of 11/01/22    MRI Brain Demyelinating W W/O Contrast    Impression  Stable nonspecific white matter lesions consistent with the history of multiple sclerosis with no new or enhancing lesion identified.      Electronically signed by: Demetrius Phan  Date:    11/02/2022  Time:    08:25    Results for orders placed during the hospital encounter of 11/01/22    MRI Cervical Spine Demyelinating W W/O Contrast    Impression  Stable lesions within the cervical cord with no new or enhancing lesion.    In the thoracic cord there are lesions distally.  There is no prior study for comparison.  Minimal enhancement of one of the lesions is difficult to exclude but thought less likely with no evidence of definite cord expansion.      Electronically signed by: Demetrius Phan  Date:    11/02/2022  Time:    10:00    Results for orders placed during the hospital encounter of 11/01/22    MRI Thoracic Spine Demyelinating W W/O Contrast    Impression  FINDINGS/  Please see the report of this MR imaging study of the cervical spine as these studies were dictated together.      Electronically signed by: Demetrius Phan  Date:    11/02/2022  Time:    12:09      Labs:      Lab Results   Component Value Date    [id number] 52 12/07/2021     Lab Results   Component Value Date    JCVINDEX 1.34 (H) 10/12/2022    JCVANTIBODY POSITIVE (A) 10/12/2022     No results found for: OZ1VYJRT, ABSOLUTECD3, SB7NJCXO, ABSOLUTECD8, WH9ITRHQ, ABSOLUTECD4, LABCD48  Lab Results   Component Value Date    WBC 6.73 10/12/2022    RBC 5.07 10/12/2022    HGB 15.8 10/12/2022    HCT 47.1 10/12/2022    MCV 93 10/12/2022    MCH 31.2 (H) 10/12/2022    MCHC 33.5 10/12/2022    RDW 12.2 10/12/2022     10/12/2022    MPV 8.7 (L) 10/12/2022    GRAN 4.3 10/12/2022    GRAN 64.4 10/12/2022    LYMPH 1.6 10/12/2022    LYMPH 23.8 10/12/2022    MONO 0.5 10/12/2022    MONO 8.0 10/12/2022    EOS 0.2 10/12/2022    BASO 0.05 10/12/2022    EOSINOPHIL 2.4 10/12/2022    BASOPHIL 0.7 10/12/2022     Sodium   Date Value Ref Range Status   10/12/2022 140 136 - 145 mmol/L Final     Potassium   Date Value Ref Range Status   10/12/2022 3.9 3.5 - 5.1 mmol/L Final     Chloride   Date Value Ref Range Status   10/12/2022 104 95 - 110 mmol/L Final     CO2   Date Value Ref Range Status   10/12/2022 28 23 - 29 mmol/L Final     Glucose   Date Value Ref Range Status   10/12/2022 107 70 - 110 mg/dL Final     BUN   Date Value Ref Range Status   10/12/2022 12 6 - 20 mg/dL Final     Creatinine   Date Value Ref Range Status   10/12/2022 0.8 0.5 - 1.4 mg/dL Final     Calcium   Date Value Ref Range Status   10/12/2022 9.6 8.7 - 10.5 mg/dL Final     Total Protein   Date Value Ref Range Status   10/12/2022 7.7 6.0 - 8.4 g/dL Final     Albumin   Date Value Ref Range Status   10/12/2022 4.5 3.5 - 5.2 g/dL Final     Total Bilirubin   Date Value Ref Range Status   10/12/2022 0.6 0.1 - 1.0 mg/dL Final     Comment:     For infants and newborns, interpretation of results should be based  on gestational age, weight and in agreement with clinical  observations.    Premature Infant recommended reference ranges:  Up to 24 hours.............<8.0 mg/dL  Up to  48 hours............<12.0 mg/dL  3-5 days..................<15.0 mg/dL  6-29 days.................<15.0 mg/dL       Alkaline Phosphatase   Date Value Ref Range Status   10/12/2022 70 55 - 135 U/L Final     AST   Date Value Ref Range Status   10/12/2022 21 10 - 40 U/L Final     ALT   Date Value Ref Range Status   10/12/2022 30 10 - 44 U/L Final     Anion Gap   Date Value Ref Range Status   10/12/2022 8 8 - 16 mmol/L Final     eGFR if    Date Value Ref Range Status   12/07/2021 >60 >60 mL/min/1.73 m^2 Final     eGFR if non    Date Value Ref Range Status   12/07/2021 >60 >60 mL/min/1.73 m^2 Final     Comment:     Calculation used to obtain the estimated glomerular filtration  rate (eGFR) is the CKD-EPI equation.                   Diagnosis/Assessment/Plan:       NEURO MULTIPLE SCLEROSIS IMPRESSION:   MS Status:     Clinical Progression:  Worsened    Clinical Progression comment:  Gait slower and more unsteady    Type:  Progressive    MRI Progression:  Stable  Plan:     DMT:  Implement Disease Modifying Therapy    Implement Disease Modifying Therapy:  Ocrelizumab    Implement Disease Modifying Therapy comment:  Pt made aware of the risks associated with immunosuppressant therapy, including increased risk of infection. Will order vaccines today.       Symptom Management:  Implement change in symptom management    Implement Change in Symptom Management:  Gait and Adaptive Needs (referred to PT/OP for gait training and to assess need for cane/walker/AFO, referral to OT for driving evaluation, start dalfampridine)       Labs today  Vaccinations today  3 months follow up       Problem List Items Addressed This Visit          Neuro    Multiple sclerosis - Primary (Chronic)    Relevant Medications    dalfampridine 10 mg Tb12    Other Relevant Orders    CBC auto differential    Comprehensive metabolic panel    HEPATITIS B CORE ANTIBODY, TOTAL    HEPATITIS B SURFACE ANTIGEN    IMMUNOGLOBULINS  (IGG, IGA, IGM) QUANTITATIVE    (In Office Administered) Hepatitis B (Recombinant) Adjuvanted, 2 dose (Completed)    Ambulatory referral/consult to Physical/Occupational Therapy    Ambulatory referral/consult to Physical/Occupational Therapy    OT driving evaluation     Other Visit Diagnoses       Fatigue, unspecified type        Gait disturbance        Relevant Medications    dalfampridine 10 mg Tb12    Other Relevant Orders    Ambulatory referral/consult to Physical/Occupational Therapy    Ambulatory referral/consult to Physical/Occupational Therapy    Impaired driving skills        Relevant Orders    OT driving evaluation                Total time spent with the patient: 60 minutes, including face to face consultation, chart review and coordination of care, on the day of the visit. This includes face to face time and non-face to face time preparing to see the patient (eg, review of tests), obtaining and/or reviewing separately obtained history, documenting clinical information in the electronic or other health record, independently interpreting resultsand communicating results to the patient/family/caregiver, or care coordination.         Addie Chowdhury, RADHAP-C

## 2023-04-03 NOTE — PROGRESS NOTES
Patient received Shingrix IM in right arm, Heplisav B, [id number] IM in left arm.  Patient tolerated well and left clinic NAD after observation.

## 2023-04-04 ENCOUNTER — TELEPHONE (OUTPATIENT)
Dept: NEUROLOGY | Facility: CLINIC | Age: 63
End: 2023-04-04
Payer: MEDICARE

## 2023-04-04 NOTE — TELEPHONE ENCOUNTER
Provider portion of Ocrevus start form completed on [email or web address] website    Patient portion of Ocrevus start form uploaded to [email or web address] website

## 2023-04-06 ENCOUNTER — SPECIALTY PHARMACY (OUTPATIENT)
Dept: PHARMACY | Facility: CLINIC | Age: 63
End: 2023-04-06
Payer: MEDICARE

## 2023-04-06 ENCOUNTER — TELEPHONE (OUTPATIENT)
Dept: PSYCHIATRY | Facility: CLINIC | Age: 63
End: 2023-04-06
Payer: MEDICARE

## 2023-04-06 ENCOUNTER — PATIENT MESSAGE (OUTPATIENT)
Dept: PSYCHIATRY | Facility: CLINIC | Age: 63
End: 2023-04-06
Payer: MEDICARE

## 2023-04-06 NOTE — TELEPHONE ENCOUNTER
Benefit Investigation    Dalfampridine  [employer] Medicare  No LIS   Estimated copay: $47   Pt is in initial phase  [facility] in Network

## 2023-04-06 NOTE — TELEPHONE ENCOUNTER
Outgoing call to pt regarding Dalfampridine approval and copay. Pt stated he would be okay with $47 copay for now but would like a breakdown for when he goes into Coverage Gap    Will need to call plan for a thorough BI

## 2023-04-06 NOTE — TELEPHONE ENCOUNTER
Dx: MS with Gait Disturbances. Dalfampridine 10 mg BID dose appropriate.    Labs: 04/03/2023  CrCl: 107.2 mL / min (baseline)    Dalfampridine PA submitted via [facility]. Key: [id number] - PA Case ID: [id number]

## 2023-04-06 NOTE — TELEPHONE ENCOUNTER
Dalfampridine PA approved. PA Case: [id number], Status: Approved, Coverage Starts on: 4/6/2023 12:00:00 AM, Coverage Ends on: 10/3/2023    Test claim: $47    Forward to BI

## 2023-04-12 ENCOUNTER — TELEPHONE (OUTPATIENT)
Dept: NEUROLOGY | Facility: CLINIC | Age: 63
End: 2023-04-12
Payer: MEDICARE

## 2023-04-12 NOTE — TELEPHONE ENCOUNTER
"Spoke with patient to reschedule his 7/7 appointment with Dr.Jenny Anne to 7/14 due to  will be I stated I will reschedule your appointment due to provider being out he started yelling " great you can't tell me people are constantly making babies and Im sitting here getting rescheduled I replied with is 7/14 ok for you he continued to scream " yes just reschedule you guys have vacations , babies just reschedule I am changing my calender and and writing down " and continued to screamed in gibberish  "

## 2023-04-13 DIAGNOSIS — Z86.69 HX OF MIGRAINE HEADACHES: ICD-10-CM

## 2023-04-13 RX ORDER — AMITRIPTYLINE HYDROCHLORIDE 25 MG/1
25 TABLET, FILM COATED ORAL NIGHTLY
Qty: 90 TABLET | Refills: 1 | Status: SHIPPED | OUTPATIENT
Start: 2023-04-13 | End: 2023-09-05

## 2023-04-13 NOTE — TELEPHONE ENCOUNTER
Outgoing call to pt regarding [facility], pt must create an account with Pharmacy. Prescription will need email of member attached on rx prior to routing.    Pt's email: [email or web address]    Dalfampridine Rx was routed on accident without pt's email  Staff message sent to MDO for new Dalfampridine rx to be sent to [facility] or directly to [facility] with pt's email attached as: [email or web address]

## 2023-04-13 NOTE — TELEPHONE ENCOUNTER
Benefit Investigation    Dalfampridine  [employer] Medicare per Lara  No LIS  Estimated copay: $47   Pt is in initial phase    *Pt has no deductible  Initial phase: pt's copay will remain at $47 until he meets $4660  Coverage Gap: pt is responsible for 25 % of drug cost until he meets $7400  Catastrophic phase: pt will be responsible for 5% for $4.15 for generic / $10.35 for brand - **whichever is greater

## 2023-04-14 ENCOUNTER — TELEPHONE (OUTPATIENT)
Dept: NEUROLOGY | Facility: CLINIC | Age: 63
End: 2023-04-14
Payer: MEDICARE

## 2023-04-14 DIAGNOSIS — G35 MULTIPLE SCLEROSIS: Chronic | ICD-10-CM

## 2023-04-14 DIAGNOSIS — R26.9 GAIT DISTURBANCE: ICD-10-CM

## 2023-04-14 RX ORDER — DALFAMPRIDINE 10 MG/1
10 TABLET, FILM COATED, EXTENDED RELEASE ORAL EVERY 12 HOURS
Qty: 60 TABLET | Refills: 5 | Status: SHIPPED | OUTPATIENT
Start: 2023-04-14 | End: 2023-08-15 | Stop reason: SDUPTHER

## 2023-04-14 RX ORDER — DALFAMPRIDINE 10 MG/1
10 TABLET, FILM COATED, EXTENDED RELEASE ORAL 2 TIMES DAILY
Qty: 60 TABLET | Refills: 5 | OUTPATIENT
Start: 2023-04-14 | End: 2023-04-14 | Stop reason: SDUPTHER

## 2023-04-14 NOTE — TELEPHONE ENCOUNTER
Dalfampridine RX sent to [facility] with patients email in pharmacy comments    04/3/2023    CrCL 96.1 mL / min

## 2023-04-14 NOTE — TELEPHONE ENCOUNTER
----- Message from Tierney Triana RN sent at 4/13/2023  6:35 PM CDT -----  Regarding: FW: Dalfampridine and [facility]    ----- Message -----  From: Guerline Sarabia PharmD  Sent: 4/13/2023  12:58 PM CDT  To: Tierney Triana RN, Danica Anne MD, #  Subject: Dalfampridine and [facility]             Good afternoon  Dr. Anne and Staff,     The PA for Dalfampridine has been approved with a $47 copay right now. The patient is currently in the initial phase of Medicare, once the patient reaches the coverage gap (donut hole), his copay will become unaffordable. Therefore we discussed [facility] (estimated at ~ $19.80 per month).     The patient opted to use [facility]. The prescription has been routed over to the pharmacy, however, I forgot to add his email to the prescription prior to routing.     At your earliest convenience, can you please send [facility] or send directly to [facility] a new prescription for Dalfampridine with the email: [email or web address] attached.    Thank you so much in advance,  Guerline Sarabia PharmD  Clinical Pharmacist, Financial Assistance Team  [facility]  (P): [phone]  (F): [phone]

## 2023-04-14 NOTE — TELEPHONE ENCOUNTER
MDO sent new rx for Dalfampridine to [facility] with pt's email in pharmacy comments.  Closing out referral at [facility]

## 2023-04-18 NOTE — TELEPHONE ENCOUNTER
Patient Ocrevus approved for the patient to receive at [facility] [place] through pre-services. Patient also approved for free drug through [email or web address] (approval letter in media). Consent uploaded to media    Called patient to review approval and discuss scheduling. No answer. LVM    Patient received first doses of vaccines on 4/03 so ok to receive Ocrevus after 4/17    Labs 4/3/2023  WBC 7.36  ALC 2031  AST 21, ALT 31  IgG  1159  IgM  33 - ok to proceed with Ocrevus per Dr. Anne  IgA 348  HBsAg (-) anti-Hbc (-) - no current or past infection. Received Hep B vaccine on 4/3    10/12/2022  HIV (-)  Varicella IgG- immune  Strongyloides (-)  Tuberculosis (-)  JCV (+)    Per Dr. Anne, no Hep C antibody needed

## 2023-04-19 NOTE — TELEPHONE ENCOUNTER
Called patient to discuss first doses of Ocrevus infusion. Patient states he has to check with his girlfriend on when she can bring him. He request call back on 4/20. Patient will receive Ocrevus at [facility]    - Discussed timeline of Ocrevus infusion  -1st hour Pre-meds: - Benadryl, Pepcid,  Solu-Medrol, and Tylenol  -Ocrevus infusion will be 3 and 1/2 hours  -Will be monitored for 1 hour after infusion for reaction  -Infusion will be stopped if he is to experience any rash, hives, or swelling of the throat and extra dose of benadryl / steroids may be administered then infusion will be re-started based on tolerability.    -Advised to have someone bring him to the first infusion incase of drowsiness of benadryl  -Informed patient to stay well hydrated the day before, day of, and after infusion to help with fatigue     -Will f/u on 4/20

## 2023-04-20 NOTE — TELEPHONE ENCOUNTER
Patient would like to receive first Ocrevus infusion on 05/29/2023 and 06/12/2023.     Message sent to [facility] infusion team for scheduling

## 2023-05-18 ENCOUNTER — TELEPHONE (OUTPATIENT)
Dept: NEUROLOGY | Facility: CLINIC | Age: 63
End: 2023-05-18
Payer: MEDICARE

## 2023-05-18 NOTE — TELEPHONE ENCOUNTER
----- Message from Iris Curran sent at 5/18/2023  8:04 AM CDT -----  Regarding: infusion inquiry  Contact: PT @ [phone]  Pt called to speak with someone in the office regarding the infusion that's scheduled 05/29. Please contact the pt. Thanks.

## 2023-05-26 NOTE — TELEPHONE ENCOUNTER
Called patient and discussed Ocrevus infusion for Monday. Patient states he read that Ocrevus can give him Herpes. Advised Ocrevus is an immunosuppressive and more susceptible to certain infections like herpes. Provided patient number to [employer] to coordinate wheelchair to help get to the infusion center. No other questions or concerns

## 2023-05-29 ENCOUNTER — INFUSION (OUTPATIENT)
Dept: INFUSION THERAPY | Facility: OTHER | Age: 63
End: 2023-05-29
Attending: STUDENT IN AN ORGANIZED HEALTH CARE EDUCATION/TRAINING PROGRAM
Payer: MEDICARE

## 2023-05-29 VITALS
DIASTOLIC BLOOD PRESSURE: 73 MMHG | BODY MASS INDEX: 27.32 KG/M2 | OXYGEN SATURATION: 99 % | HEIGHT: 66 IN | RESPIRATION RATE: 16 BRPM | TEMPERATURE: 98 F | SYSTOLIC BLOOD PRESSURE: 143 MMHG | WEIGHT: 170 LBS | HEART RATE: 89 BPM

## 2023-05-29 DIAGNOSIS — G35 MULTIPLE SCLEROSIS: Primary | ICD-10-CM

## 2023-05-29 PROCEDURE — 96367 TX/PROPH/DG ADDL SEQ IV INF: CPT | Mod: HCWC

## 2023-05-29 PROCEDURE — 96375 TX/PRO/DX INJ NEW DRUG ADDON: CPT | Mod: HCWC

## 2023-05-29 PROCEDURE — 96413 CHEMO IV INFUSION 1 HR: CPT | Mod: HCWC

## 2023-05-29 PROCEDURE — 25000003 PHARM REV CODE 250: Mod: HCWC | Performed by: STUDENT IN AN ORGANIZED HEALTH CARE EDUCATION/TRAINING PROGRAM

## 2023-05-29 PROCEDURE — 63600175 PHARM REV CODE 636 W HCPCS: Mod: JZ,JG,HCWC | Performed by: STUDENT IN AN ORGANIZED HEALTH CARE EDUCATION/TRAINING PROGRAM

## 2023-05-29 PROCEDURE — 96415 CHEMO IV INFUSION ADDL HR: CPT | Mod: HCWC

## 2023-05-29 RX ORDER — DIPHENHYDRAMINE HYDROCHLORIDE 50 MG/ML
50 INJECTION INTRAMUSCULAR; INTRAVENOUS
Status: DISCONTINUED | OUTPATIENT
Start: 2023-05-29 | End: 2023-05-29 | Stop reason: HOSPADM

## 2023-05-29 RX ORDER — HEPARIN 100 UNIT/ML
500 SYRINGE INTRAVENOUS
Status: CANCELLED | OUTPATIENT
Start: 2023-06-12

## 2023-05-29 RX ORDER — SODIUM CHLORIDE 0.9 % (FLUSH) 0.9 %
10 SYRINGE (ML) INJECTION
Status: DISCONTINUED | OUTPATIENT
Start: 2023-05-29 | End: 2023-05-29 | Stop reason: HOSPADM

## 2023-05-29 RX ORDER — DIPHENHYDRAMINE HYDROCHLORIDE 50 MG/ML
50 INJECTION INTRAMUSCULAR; INTRAVENOUS
Status: CANCELLED | OUTPATIENT
Start: 2023-06-12

## 2023-05-29 RX ORDER — EPINEPHRINE 0.3 MG/.3ML
0.3 INJECTION SUBCUTANEOUS
Status: CANCELLED | OUTPATIENT
Start: 2023-06-12

## 2023-05-29 RX ORDER — SODIUM CHLORIDE 0.9 % (FLUSH) 0.9 %
10 SYRINGE (ML) INJECTION
Status: CANCELLED | OUTPATIENT
Start: 2023-06-12

## 2023-05-29 RX ORDER — ACETAMINOPHEN 500 MG
1000 TABLET ORAL
Status: CANCELLED | OUTPATIENT
Start: 2023-06-12

## 2023-05-29 RX ORDER — FAMOTIDINE 10 MG/ML
20 INJECTION INTRAVENOUS
Status: COMPLETED | OUTPATIENT
Start: 2023-05-29 | End: 2023-05-29

## 2023-05-29 RX ORDER — HEPARIN 100 UNIT/ML
500 SYRINGE INTRAVENOUS
Status: DISCONTINUED | OUTPATIENT
Start: 2023-05-29 | End: 2023-05-29 | Stop reason: HOSPADM

## 2023-05-29 RX ORDER — ACETAMINOPHEN 500 MG
1000 TABLET ORAL
Status: COMPLETED | OUTPATIENT
Start: 2023-05-29 | End: 2023-05-29

## 2023-05-29 RX ORDER — EPINEPHRINE 0.3 MG/.3ML
0.3 INJECTION SUBCUTANEOUS
Status: DISCONTINUED | OUTPATIENT
Start: 2023-05-29 | End: 2023-05-29 | Stop reason: HOSPADM

## 2023-05-29 RX ORDER — FAMOTIDINE 10 MG/ML
20 INJECTION INTRAVENOUS
Status: CANCELLED | OUTPATIENT
Start: 2023-06-12

## 2023-05-29 RX ADMIN — DEXTROSE 100 MG: 50 INJECTION, SOLUTION INTRAVENOUS at 09:05

## 2023-05-29 RX ADMIN — DIPHENHYDRAMINE HYDROCHLORIDE 50 MG: 50 INJECTION, SOLUTION INTRAMUSCULAR; INTRAVENOUS at 09:05

## 2023-05-29 RX ADMIN — ACETAMINOPHEN 1000 MG: 500 TABLET ORAL at 09:05

## 2023-05-29 RX ADMIN — FAMOTIDINE 20 MG: 10 INJECTION INTRAVENOUS at 09:05

## 2023-05-29 RX ADMIN — OCRELIZUMAB 300 MG: 300 INJECTION INTRAVENOUS at 10:05

## 2023-05-29 NOTE — PLAN OF CARE
Ocrevus infusion administered, no reaction. Patient tolerated well. Patient accompanied by friend for discharge home. 24 gauge IV removed, catheter intact. No apparent distress noted. Discharge instructions given to patient. Patient understands instructions. Follow up appointment scheduled.

## 2023-06-12 ENCOUNTER — INFUSION (OUTPATIENT)
Dept: INFUSION THERAPY | Facility: OTHER | Age: 63
End: 2023-06-12
Attending: STUDENT IN AN ORGANIZED HEALTH CARE EDUCATION/TRAINING PROGRAM
Payer: MEDICARE

## 2023-06-12 VITALS
SYSTOLIC BLOOD PRESSURE: 131 MMHG | DIASTOLIC BLOOD PRESSURE: 73 MMHG | HEART RATE: 87 BPM | OXYGEN SATURATION: 97 % | TEMPERATURE: 98 F | WEIGHT: 166.69 LBS | RESPIRATION RATE: 16 BRPM | BODY MASS INDEX: 26.9 KG/M2

## 2023-06-12 DIAGNOSIS — G35 MULTIPLE SCLEROSIS: Primary | ICD-10-CM

## 2023-06-12 PROCEDURE — 96375 TX/PRO/DX INJ NEW DRUG ADDON: CPT | Mod: HCWC

## 2023-06-12 PROCEDURE — 96413 CHEMO IV INFUSION 1 HR: CPT | Mod: HCWC

## 2023-06-12 PROCEDURE — 25000003 PHARM REV CODE 250: Mod: HCWC | Performed by: STUDENT IN AN ORGANIZED HEALTH CARE EDUCATION/TRAINING PROGRAM

## 2023-06-12 PROCEDURE — 96367 TX/PROPH/DG ADDL SEQ IV INF: CPT | Mod: HCWC

## 2023-06-12 PROCEDURE — 96415 CHEMO IV INFUSION ADDL HR: CPT | Mod: HCWC

## 2023-06-12 PROCEDURE — 63600175 PHARM REV CODE 636 W HCPCS: Mod: HCWC | Performed by: STUDENT IN AN ORGANIZED HEALTH CARE EDUCATION/TRAINING PROGRAM

## 2023-06-12 RX ORDER — HEPARIN 100 UNIT/ML
500 SYRINGE INTRAVENOUS
Status: DISCONTINUED | OUTPATIENT
Start: 2023-06-12 | End: 2023-06-12 | Stop reason: HOSPADM

## 2023-06-12 RX ORDER — EPINEPHRINE 0.3 MG/.3ML
0.3 INJECTION SUBCUTANEOUS
Status: DISCONTINUED | OUTPATIENT
Start: 2023-06-12 | End: 2023-06-12 | Stop reason: HOSPADM

## 2023-06-12 RX ORDER — FAMOTIDINE 10 MG/ML
20 INJECTION INTRAVENOUS
Status: CANCELLED | OUTPATIENT
Start: 2023-06-12

## 2023-06-12 RX ORDER — SODIUM CHLORIDE 0.9 % (FLUSH) 0.9 %
10 SYRINGE (ML) INJECTION
Status: CANCELLED | OUTPATIENT
Start: 2023-06-12

## 2023-06-12 RX ORDER — ACETAMINOPHEN 500 MG
1000 TABLET ORAL
Status: COMPLETED | OUTPATIENT
Start: 2023-06-12 | End: 2023-06-12

## 2023-06-12 RX ORDER — DIPHENHYDRAMINE HYDROCHLORIDE 50 MG/ML
50 INJECTION INTRAMUSCULAR; INTRAVENOUS
Status: DISCONTINUED | OUTPATIENT
Start: 2023-06-12 | End: 2023-06-12 | Stop reason: HOSPADM

## 2023-06-12 RX ORDER — EPINEPHRINE 0.3 MG/.3ML
0.3 INJECTION SUBCUTANEOUS
Status: CANCELLED | OUTPATIENT
Start: 2023-06-12

## 2023-06-12 RX ORDER — FAMOTIDINE 10 MG/ML
20 INJECTION INTRAVENOUS
Status: COMPLETED | OUTPATIENT
Start: 2023-06-12 | End: 2023-06-12

## 2023-06-12 RX ORDER — DIPHENHYDRAMINE HYDROCHLORIDE 50 MG/ML
50 INJECTION INTRAMUSCULAR; INTRAVENOUS
Status: CANCELLED | OUTPATIENT
Start: 2023-06-12

## 2023-06-12 RX ORDER — ACETAMINOPHEN 500 MG
1000 TABLET ORAL
Status: CANCELLED | OUTPATIENT
Start: 2023-06-12

## 2023-06-12 RX ORDER — HEPARIN 100 UNIT/ML
500 SYRINGE INTRAVENOUS
Status: CANCELLED | OUTPATIENT
Start: 2023-06-12

## 2023-06-12 RX ORDER — SODIUM CHLORIDE 0.9 % (FLUSH) 0.9 %
10 SYRINGE (ML) INJECTION
Status: DISCONTINUED | OUTPATIENT
Start: 2023-06-12 | End: 2023-06-12 | Stop reason: HOSPADM

## 2023-06-12 RX ADMIN — SODIUM CHLORIDE: 9 INJECTION, SOLUTION INTRAVENOUS at 08:06

## 2023-06-12 RX ADMIN — FAMOTIDINE 20 MG: 10 INJECTION INTRAVENOUS at 08:06

## 2023-06-12 RX ADMIN — OCRELIZUMAB 300 MG: 300 INJECTION INTRAVENOUS at 09:06

## 2023-06-12 RX ADMIN — DEXTROSE 100 MG: 50 INJECTION, SOLUTION INTRAVENOUS at 09:06

## 2023-06-12 RX ADMIN — DIPHENHYDRAMINE HYDROCHLORIDE 50 MG: 50 INJECTION, SOLUTION INTRAMUSCULAR; INTRAVENOUS at 08:06

## 2023-06-12 RX ADMIN — ACETAMINOPHEN 1000 MG: 500 TABLET ORAL at 08:06

## 2023-06-12 NOTE — PLAN OF CARE
Vital Signs Stable, No apparent distress noted; Pt tolerated _Ocrevus w/o difficulty.  AVS/Discharge instructions given/all questions answered ;Pt verbalizes understanding.   Follow up appts per joeyt; patient ESCORT wheeled  pt from clinic in [facility], accompanied by wife.

## 2023-06-13 ENCOUNTER — TELEPHONE (OUTPATIENT)
Dept: NEUROLOGY | Facility: CLINIC | Age: 63
End: 2023-06-13
Payer: MEDICARE

## 2023-06-13 NOTE — TELEPHONE ENCOUNTER
"----- Message from Haja Johnson sent at 6/13/2023  2:16 PM CDT -----  Regarding: Pt advice  Contact: pt [phone]  Pt would like to speak with provider to change the administration of OCRELIZUMAB (OCREVUS) FOR MS infusion. Pt would like to request a Rx for the "walking drug" Dalfampridine/Ampyra Please call @[phone]    "

## 2023-06-14 ENCOUNTER — PATIENT MESSAGE (OUTPATIENT)
Dept: NEUROLOGY | Facility: CLINIC | Age: 63
End: 2023-06-14
Payer: MEDICARE

## 2023-06-23 ENCOUNTER — OFFICE VISIT (OUTPATIENT)
Dept: URGENT CARE | Facility: CLINIC | Age: 63
End: 2023-06-23
Payer: MEDICARE

## 2023-06-23 VITALS
OXYGEN SATURATION: 97 % | SYSTOLIC BLOOD PRESSURE: 136 MMHG | TEMPERATURE: 99 F | WEIGHT: 165 LBS | RESPIRATION RATE: 20 BRPM | DIASTOLIC BLOOD PRESSURE: 79 MMHG | BODY MASS INDEX: 26.52 KG/M2 | HEART RATE: 79 BPM | HEIGHT: 66 IN

## 2023-06-23 DIAGNOSIS — S01.81XA FOREHEAD LACERATION, INITIAL ENCOUNTER: Primary | ICD-10-CM

## 2023-06-23 DIAGNOSIS — S09.90XA CLOSED HEAD INJURY, INITIAL ENCOUNTER: ICD-10-CM
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
## 2023-06-23 PROCEDURE — 99203 OFFICE O/P NEW LOW 30 MIN: CPT | Mod: 25,S$GLB,, | Performed by: NURSE PRACTITIONER

## 2023-06-23 PROCEDURE — 12011 RPR F/E/E/N/L/M 2.5 CM/<: CPT | Mod: S$GLB,,, | Performed by: NURSE PRACTITIONER

## 2023-06-23 PROCEDURE — 12011 LACERATION REPAIR: ICD-10-PCS | Mod: S$GLB,,, | Performed by: NURSE PRACTITIONER

## 2023-06-23 PROCEDURE — 90471 TDAP VACCINE GREATER THAN OR EQUAL TO 7YO IM: ICD-10-PCS | Mod: S$GLB,,, | Performed by: NURSE PRACTITIONER

## 2023-06-23 PROCEDURE — 90471 IMMUNIZATION ADMIN: CPT | Mod: S$GLB,,, | Performed by: NURSE PRACTITIONER

## 2023-06-23 PROCEDURE — 90715 TDAP VACCINE 7 YRS/> IM: CPT | Mod: S$GLB,,, | Performed by: NURSE PRACTITIONER

## 2023-06-23 PROCEDURE — 90715 TDAP VACCINE GREATER THAN OR EQUAL TO 7YO IM: ICD-10-PCS | Mod: S$GLB,,, | Performed by: NURSE PRACTITIONER

## 2023-06-23 PROCEDURE — 99203 PR OFFICE/OUTPT VISIT, NEW, LEVL III, 30-44 MIN: ICD-10-PCS | Mod: 25,S$GLB,, | Performed by: NURSE PRACTITIONER

## 2023-06-23 RX ORDER — MUPIROCIN 20 MG/G
OINTMENT TOPICAL 3 TIMES DAILY
Qty: 15 G | Refills: 0 | Status: SHIPPED | OUTPATIENT
Start: 2023-06-23 | End: 2023-11-20

## 2023-06-23 NOTE — PATIENT INSTRUCTIONS
Clean the wound twice a day with antibacterial soap and water, then apply dressing using  antibiotic ointment. Use the prescribed ointment if one was provided.    Tylenol or ibuprofen for pain or fever as needed    Your wound is not infected at this time. However if it gets painful, turns red or drainage occurs,  you need to get reevaluated.    If your condition worsens or fails to improve we recommend that you receive another evaluation at the ER immediately or contact your PCP to discuss your concerns or return here.     You must understand that you've received an urgent care treatment only and that you may be released before all your medical problems are known or treated. You the patient will arrange for followup care as instructed.

## 2023-06-23 NOTE — PROGRESS NOTES
"Subjective:      Patient ID: Hernandez Hernandez is a 62 y.o. male.    Vitals:  height is 5' 6" (1.676 m) and weight is 74.8 kg (165 lb). His temperature is 98.7 °F (37.1 °C). His blood pressure is 136/79 and his pulse is 79. His respiration is 20 and oxygen saturation is 97%.     Chief Complaint: Laceration    Patient is a 62-year-old male who presents for evaluation of laceration to the forehead.  Onset few hours ago.  He states he was in the shower where slipped hitting his head on the shower wall.  A small laceration with bleeding moderately controlled.  He is not taken any medication for symptoms.  He denies any loss of consciousness is not anticoagulated.  Denies any associated visual change, dizziness, weakness, nausea, vomiting, palpitations.  Last tetanus status unknown.  No other concerns are voiced.    Laceration   The incident occurred 1 to 3 hours ago. The laceration is located on the Face. Injury mechanism: tile shower. The pain is at a severity of 0/10. The patient is experiencing no pain. He reports no foreign bodies present. His tetanus status is unknown.   Constitution: Negative for chills and fever.   Eyes:  Negative for vision loss and double vision.   Skin:  Positive for laceration. Negative for erythema and bruising.   Neurological:  Negative for dizziness, light-headedness, altered mental status, loss of consciousness and seizures.   Psychiatric/Behavioral:  Negative for altered mental status.     Objective:     Physical Exam   Constitutional: He is oriented to person, place, and time. He appears well-developed. He is cooperative.   HENT:   Head: Normocephalic and atraumatic.   Ears:   Right Ear: Hearing and external ear normal.   Left Ear: Hearing and external ear normal.   Nose: Nose normal. No mucosal edema or nasal deformity. No epistaxis. Right sinus exhibits no maxillary sinus tenderness and no frontal sinus tenderness. Left sinus exhibits no maxillary sinus tenderness and no frontal sinus " tenderness.   Mouth/Throat: Uvula is midline, oropharynx is clear and moist and mucous membranes are normal. No trismus in the jaw. Normal dentition. No uvula swelling.   Eyes: Conjunctivae and lids are normal.   Neck: Trachea normal and phonation normal. Neck supple.   Cardiovascular: Normal rate, regular rhythm, normal heart sounds and normal pulses.   Pulmonary/Chest: Effort normal and breath sounds normal.   Abdominal: Normal appearance and bowel sounds are normal. Soft.   Musculoskeletal: Normal range of motion.         General: Normal range of motion.   Neurological: no focal deficit. He is alert and oriented to person, place, and time. He exhibits normal muscle tone.   Skin: Skin is warm and intact. Capillary refill takes less than 2 seconds. No erythema         Comments: Right forehead, 1.5 cm in linear superficial laceration bleeding control.  Mild associated swelling.  No ecchymosis or erythema.   Psychiatric: His speech is normal and behavior is normal. Judgment and thought content normal.   Nursing note and vitals reviewed.    Assessment:     1. Forehead laceration, initial encounter    2. Fall, initial encounter    3. Closed head injury, initial encounter        Plan:       Forehead laceration, initial encounter    Fall, initial encounter    Closed head injury, initial encounter    Other orders  -     (In Office Administered) Tdap Vaccine  -     mupirocin (BACTROBAN) 2 % ointment; Apply topically 3 (three) times daily.  Dispense: 15 g; Refill: 0          Medical Decision Making:   Initial Assessment:   Nontoxic appearing 61 yo male c/o head laceration.  After complete evaluation, including thorough history and physical exam, the patient's symptoms are consistent with a simple laceration. There are no concerning features on physical exam to suggest sytemic bacterial infection, abscess, retained foreign body. Vital signs do not suggest sepsis. There is no erythema, drainage or limited ROM to suggest  deeper pathology such as tendon or vascular involvement. Tetanus given today . The patient will be provided with wound care instructions. Will provide RX for mupirocin ointment upon D/C. Return precautions and follow up instructions were provided.       Laceration Repair    Date/Time: 2023 1:30 PM  Performed by: Autumn Griffin NP  Authorized by: Autumn Griffin NP   Consent Done: Yes  Consent: Verbal consent obtained.  Risks and benefits: risks, benefits and alternatives were discussed  Consent given by: patient  Patient understanding: patient states understanding of the procedure being performed  Patient consent: the patient's understanding of the procedure matches consent given  Patient identity confirmed: , name and verbally with patient  Body area: head/neck  Location details: forehead  Laceration length: 1.5 cm  Foreign bodies: no foreign bodies  Tendon involvement: none  Nerve involvement: none  Vascular damage: no    Patient sedated: no  Preparation: Patient was prepped and draped in the usual sterile fashion.  Irrigation solution: saline  Irrigation method: syringe  Amount of cleaning: standard  Debridement: none  Degree of undermining: none  Skin closure: staples  Number of sutures: 3  Approximation: close  Approximation difficulty: simple  Dressing: antibiotic ointment and non-stick sterile dressing  Patient tolerance: Patient tolerated the procedure well with no immediate complications      Patient Instructions   Clean the wound twice a day with antibacterial soap and water, then apply dressing using  antibiotic ointment. Use the prescribed ointment if one was provided.    Tylenol or ibuprofen for pain or fever as needed    Your wound is not infected at this time. However if it gets painful, turns red or drainage occurs,  you need to get reevaluated.    If your condition worsens or fails to improve we recommend that you receive another evaluation at the ER immediately or contact your  PCP to discuss your concerns or return here.     You must understand that you've received an urgent care treatment only and that you may be released before all your medical problems are known or treated. You the patient will arrange for followup care as instructed.

## 2023-06-23 NOTE — PROCEDURES
Laceration Repair    Date/Time: 2023 1:30 PM  Performed by: Autumn Griffin NP  Authorized by: Autumn Griffin NP   Consent Done: Yes  Consent: Verbal consent obtained.  Risks and benefits: risks, benefits and alternatives were discussed  Consent given by: patient  Patient understanding: patient states understanding of the procedure being performed  Patient consent: the patient's understanding of the procedure matches consent given  Patient identity confirmed: , name and verbally with patient  Body area: head/neck  Location details: forehead  Laceration length: 1.5 cm  Foreign bodies: no foreign bodies  Tendon involvement: none  Nerve involvement: none  Vascular damage: no    Patient sedated: no  Preparation: Patient was prepped and draped in the usual sterile fashion.  Irrigation solution: saline  Irrigation method: syringe  Amount of cleaning: standard  Debridement: none  Degree of undermining: none  Skin closure: staples  Number of sutures: 3  Approximation: close  Approximation difficulty: simple  Dressing: antibiotic ointment and non-stick sterile dressing  Patient tolerance: Patient tolerated the procedure well with no immediate complications      
no

## 2023-06-29 ENCOUNTER — CLINICAL SUPPORT (OUTPATIENT)
Dept: URGENT CARE | Facility: CLINIC | Age: 63
End: 2023-06-29
Payer: MEDICARE

## 2023-06-29 VITALS
HEART RATE: 74 BPM | HEIGHT: 66 IN | TEMPERATURE: 99 F | WEIGHT: 165 LBS | RESPIRATION RATE: 16 BRPM | BODY MASS INDEX: 26.52 KG/M2 | DIASTOLIC BLOOD PRESSURE: 76 MMHG | OXYGEN SATURATION: 95 % | SYSTOLIC BLOOD PRESSURE: 119 MMHG

## 2023-06-29 DIAGNOSIS — S01.81XD LACERATION OF EYEBROW AND FOREHEAD, RIGHT, SUBSEQUENT ENCOUNTER: ICD-10-CM

## 2023-06-29 DIAGNOSIS — Z48.02 ENCOUNTER FOR STAPLE REMOVAL: Primary | ICD-10-CM

## 2023-06-29 DIAGNOSIS — S01.111D LACERATION OF EYEBROW AND FOREHEAD, RIGHT, SUBSEQUENT ENCOUNTER: ICD-10-CM

## 2023-06-29 PROCEDURE — 99212 OFFICE O/P EST SF 10 MIN: CPT | Mod: S$GLB,,, | Performed by: NURSE PRACTITIONER

## 2023-06-29 PROCEDURE — 99024 SUTURE REMOVAL: ICD-10-PCS | Mod: S$GLB,,, | Performed by: NURSE PRACTITIONER

## 2023-06-29 PROCEDURE — 99212 PR OFFICE/OUTPT VISIT, EST, LEVL II, 10-19 MIN: ICD-10-PCS | Mod: S$GLB,,, | Performed by: NURSE PRACTITIONER

## 2023-06-29 PROCEDURE — 99024 POSTOP FOLLOW-UP VISIT: CPT | Mod: S$GLB,,, | Performed by: NURSE PRACTITIONER

## 2023-06-29 NOTE — PROCEDURES
Suture Removal    Date/Time: 6/29/2023 8:15 AM  Location procedure was performed: [facility]  Performed by: Autumn Griffin NP  Authorized by: Autumn Griffin NP   Body area: head/neck  Location details: forehead  Description of findings: 1cm laceration with 3 staples in place   Wound Appearance: well healed, normal color, nontender, clean and no drainage  Staples Removed: 3  Post-removal: bandaid applied and antibiotic ointment applied  Facility: sutures placed in this facility  Technical procedures used: manual using staple removal tool  Significant surgical tasks conducted by the assistant(s): none  Complications: No  Estimated blood loss (mL): 0  Specimens: No  Implants: No  Patient tolerance: Patient tolerated the procedure well with no immediate complications

## 2023-06-29 NOTE — PROGRESS NOTES
"Subjective:      Patient ID: Hernandez Hernandez is a 62 y.o. male.    Vitals:  height is 5' 6" (1.676 m) and weight is 74.8 kg (165 lb). His oral temperature is 98.5 °F (36.9 °C). His blood pressure is 119/76 and his pulse is 74. His respiration is 16 and oxygen saturation is 95%.     Chief Complaint: Suture / Staple Removal (forehead)      Patient is a 62-year-old male who presents today for suture removal.  Three sutures were placed on 06/23 to the forehead.  Patient reports wound is improving denies any fever, redness, drainage.  He has been keeping the wound cleaned and applied ointment as instructed.  No other concerns are voiced.    Suture / Staple Removal  The sutures were placed 3 to 6 days ago. He tried antibiotic ointment use since the wound repair. The treatment provided no relief. There has been no drainage from the wound. There is no redness present. There is no swelling present.     Constitution: Negative for fever.   Skin:  Positive for laceration. Negative for erythema and bruising.    Objective:     Physical Exam   Constitutional: He is oriented to person, place, and time. He appears well-developed. He is cooperative.   HENT:   Head: Normocephalic and atraumatic.   Ears:   Right Ear: Hearing, tympanic membrane and external ear normal.   Left Ear: Hearing and external ear normal.   Nose: Nose normal. No mucosal edema or nasal deformity. No epistaxis. Right sinus exhibits no maxillary sinus tenderness and no frontal sinus tenderness. Left sinus exhibits no maxillary sinus tenderness and no frontal sinus tenderness.   Mouth/Throat: Uvula is midline, oropharynx is clear and moist and mucous membranes are normal. No trismus in the jaw. Normal dentition. No uvula swelling.   Eyes: Conjunctivae and lids are normal.   Neck: Trachea normal and phonation normal. Neck supple.   Cardiovascular: Normal rate, regular rhythm, normal heart sounds and normal pulses.   Pulmonary/Chest: Effort normal and breath sounds " normal.   Abdominal: Normal appearance and bowel sounds are normal. Soft.   Musculoskeletal: Normal range of motion.         General: Normal range of motion.      Comments: Right forehead just below hairline has a 1 cm superficial laceration with 3 staples in place.  Wound is healing well.  No surrounding erythema, swelling, drainage.   Neurological: He is alert and oriented to person, place, and time. He exhibits normal muscle tone.   Skin: Skin is warm, dry and intact. No erythema   Psychiatric: His speech is normal and behavior is normal. Judgment and thought content normal.   Nursing note and vitals reviewed.    Assessment:     1. Encounter for staple removal    2. Laceration of eyebrow and forehead, right, subsequent encounter        Plan:       Encounter for staple removal  -     Suture Removal    Laceration of eyebrow and forehead, right, subsequent encounter          Medical Decision Making:   Initial Assessment:   Nontoxic appearing 63 yo male c/o laceration to forehead, sutures in place and to be removed. .  After thorough history and clinical exam, clinical impression is well healing forehead laceration. No clinical evidence of poor healing wound or wound infection/complications. Patient has no signs of acute distress and vital signs are stable, patient is discharged home with wound care instructions and return precautions.      Patient Instructions   Return for any new or worsening symptoms.

## 2023-07-03 NOTE — TELEPHONE ENCOUNTER
----- Message from Juanita Mena sent at 7/11/2022  2:51 PM CDT -----      Please reach out to patient to advise regarding the message left.  Patient states he would be agreeable to seeing Dr. Anne. Please reach out tat your earliest opportunity to get patient scheduled and moving forward w/MRI & appt    Patient can be contacted @# [phone]    
Unable to reach patient via phone LVM informing patient to return call   
never used

## 2023-07-14 ENCOUNTER — OFFICE VISIT (OUTPATIENT)
Dept: NEUROLOGY | Facility: CLINIC | Age: 63
End: 2023-07-14
Payer: MEDICARE

## 2023-07-14 VITALS
WEIGHT: 165 LBS | HEART RATE: 64 BPM | HEIGHT: 64 IN | SYSTOLIC BLOOD PRESSURE: 126 MMHG | DIASTOLIC BLOOD PRESSURE: 71 MMHG | BODY MASS INDEX: 28.17 KG/M2

## 2023-07-14 DIAGNOSIS — G35 MULTIPLE SCLEROSIS: Primary | Chronic | ICD-10-CM

## 2023-07-14 PROCEDURE — 3078F DIAST BP <80 MM HG: CPT | Mod: HCWC,CPTII,S$GLB, | Performed by: STUDENT IN AN ORGANIZED HEALTH CARE EDUCATION/TRAINING PROGRAM

## 2023-07-14 PROCEDURE — 1159F PR MEDICATION LIST DOCUMENTED IN MEDICAL RECORD: ICD-10-PCS | Mod: HCWC,CPTII,S$GLB, | Performed by: STUDENT IN AN ORGANIZED HEALTH CARE EDUCATION/TRAINING PROGRAM

## 2023-07-14 PROCEDURE — 3074F PR MOST RECENT SYSTOLIC BLOOD PRESSURE < 130 MM HG: ICD-10-PCS | Mod: HCWC,CPTII,S$GLB, | Performed by: STUDENT IN AN ORGANIZED HEALTH CARE EDUCATION/TRAINING PROGRAM

## 2023-07-14 PROCEDURE — 99999 PR PBB SHADOW E&M-EST. PATIENT-LVL IV: ICD-10-PCS | Mod: PBBFAC,HCWC,, | Performed by: STUDENT IN AN ORGANIZED HEALTH CARE EDUCATION/TRAINING PROGRAM

## 2023-07-14 PROCEDURE — 3008F BODY MASS INDEX DOCD: CPT | Mod: HCWC,CPTII,S$GLB, | Performed by: STUDENT IN AN ORGANIZED HEALTH CARE EDUCATION/TRAINING PROGRAM

## 2023-07-14 PROCEDURE — 3078F PR MOST RECENT DIASTOLIC BLOOD PRESSURE < 80 MM HG: ICD-10-PCS | Mod: HCWC,CPTII,S$GLB, | Performed by: STUDENT IN AN ORGANIZED HEALTH CARE EDUCATION/TRAINING PROGRAM

## 2023-07-14 PROCEDURE — 1160F RVW MEDS BY RX/DR IN RCRD: CPT | Mod: HCWC,CPTII,S$GLB, | Performed by: STUDENT IN AN ORGANIZED HEALTH CARE EDUCATION/TRAINING PROGRAM

## 2023-07-14 PROCEDURE — 3074F SYST BP LT 130 MM HG: CPT | Mod: HCWC,CPTII,S$GLB, | Performed by: STUDENT IN AN ORGANIZED HEALTH CARE EDUCATION/TRAINING PROGRAM

## 2023-07-14 PROCEDURE — 99215 PR OFFICE/OUTPT VISIT, EST, LEVL V, 40-54 MIN: ICD-10-PCS | Mod: HCWC,S$GLB,, | Performed by: STUDENT IN AN ORGANIZED HEALTH CARE EDUCATION/TRAINING PROGRAM

## 2023-07-14 PROCEDURE — 99999 PR PBB SHADOW E&M-EST. PATIENT-LVL IV: CPT | Mod: PBBFAC,HCWC,, | Performed by: STUDENT IN AN ORGANIZED HEALTH CARE EDUCATION/TRAINING PROGRAM

## 2023-07-14 PROCEDURE — 3008F PR BODY MASS INDEX (BMI) DOCUMENTED: ICD-10-PCS | Mod: HCWC,CPTII,S$GLB, | Performed by: STUDENT IN AN ORGANIZED HEALTH CARE EDUCATION/TRAINING PROGRAM

## 2023-07-14 PROCEDURE — 1160F PR REVIEW ALL MEDS BY PRESCRIBER/CLIN PHARMACIST DOCUMENTED: ICD-10-PCS | Mod: HCWC,CPTII,S$GLB, | Performed by: STUDENT IN AN ORGANIZED HEALTH CARE EDUCATION/TRAINING PROGRAM

## 2023-07-14 PROCEDURE — 99215 OFFICE O/P EST HI 40 MIN: CPT | Mod: HCWC,S$GLB,, | Performed by: STUDENT IN AN ORGANIZED HEALTH CARE EDUCATION/TRAINING PROGRAM

## 2023-07-14 PROCEDURE — 1159F MED LIST DOCD IN RCRD: CPT | Mod: HCWC,CPTII,S$GLB, | Performed by: STUDENT IN AN ORGANIZED HEALTH CARE EDUCATION/TRAINING PROGRAM

## 2023-07-14 RX ORDER — CLEMASTINE FUMARATE 2.68 MG/1
5.36 TABLET ORAL 2 TIMES DAILY
Qty: 120 TABLET | Refills: 0 | Status: SHIPPED | OUTPATIENT
Start: 2023-07-14 | End: 2023-08-13

## 2023-07-14 NOTE — PROGRESS NOTES
[facility]  Follow Up Patient Visit      Disease Summary     Principle neurological diagnosis: MS     Date of symptom onset: 1998  Date of diagnosis: 1998  Disease type at diagnosis: Progressive  Disease type currently: Progressive, without relapses, but active progression  Previous therapy: Avonex (s/e's) 1999 - 2006  Current therapy: Ocrevus 5/2023 - present  Last MRI Brain: 11/1/2022  Last MRI C-spine: 11/1/2022  Last MRI T-spine: 11/1/2022  CSF: NA  JCV status: 10/12/2022: positive (1.34)  Other relevant labs and tests: Vitamin D 12/7/2021: 52     Interval history:     He received his first Ocrevus infusion 5/2023, denies any infusion reaction.  He was just started on Ampyra recently from [facility]. Hasn't noticed significant improvements yet.   He feels worse in summer time, his ankle is more dystonic.   He fell in shower last month and required stitches.   Denies new symptoms.     ROS:     SOCIAL HISTORY  Living arrangements - the patient lives with their family.  Social History     Socioeconomic History    Marital status:     Number of children: 2   Occupational History    Occupation:     Occupation: [employer]    Occupation: currently unempl   Tobacco Use    Smoking status: Never    Smokeless tobacco: Never   Substance and Sexual Activity    Alcohol use: Yes     Alcohol/week: 3.0 standard drinks     Types: 2 Glasses of wine, 1 Cans of beer per week     Comment: occassionally    Drug use: No       REVIEW OF SYMPTOMS 4/3/2023   Do you feel abnormally tired on most days? Yes   Do you have difficulty controlling your bladder?  No   Do you have difficulty controlling your bowels?  No   Do you have frequent muscle cramps, tightness or spasms in your limbs?  No   Do you have new visual symptoms?  No   Do you have worsening difficulty with your memory or thinking? Yes   Do you have worsening symptoms of anxiety or depression?  No   For patients who walk, Do  "you have more difficulty walking?  Yes   Have you fallen since your last visit?  Yes   For patients who use wheelchairs: Do you have any skin wounds or breakdown? No   Do you have difficulty using your hands?  Yes   Do you have shooting or burning pain? No   If you are sexually active, are you using birth control? Y/N  N/A Not Applicable   Do you often choke when swallowing liquids or solid food?  No   Do you experience worsening symptoms when overheated? Yes   Do you need any new equipment such as a wheelchair, walker or shower chair? Yes   Do you receive co-pay financial assistance for your principal MS medicine? Not Applicable   Would you be interested in participating in an MS research trial in the future? Yes   For patients on Gilenya, Tecfidera, Aubagio, Rituxan, Ocrevus, Tysabri, Lemtrada or Methotrexate, are you aware that you should NOT receive live virus vaccines?  Not Applicable   Do you feel you have adequate family/friend support?  Yes   Do you have health insurance?   Yes   Are you currently employed? No   Do you receive SSDI/SSI?  Yes   Do you use marijuana or cannabis products? No   Have you been diagnosed with a urinary tract infection since your last visit here? No   Have you been diagnosed with a respiratory tract infection since your last visit here? No   Have you been to the emergency room since your last visit here? No   Have you been hospitalized since your last visit here?  No       Exam:     Vitals:    07/14/23 1409   BP: 126/71   Pulse: 64   Weight: 74.8 kg (165 lb)   Height: 5' 4" (1.626 m)          In general, the patient is well nourished and appears to be in no acute distress.    MENTAL STATUS: language is fluent, normal verbal comprehension, attention is normal, patient is alert and oriented x 3, fund of knowlege is appropriate by vocabulary.     CRANIAL NERVE EXAM:  bilateral AB. No facial asymmetry. Facial sensation is intact bilaterally. There is no dysarthria. Uvula is midline, " and palate moves symmetrically. Shoulder shrug intact bilaterlly. Tongue protrusion is midline. Hearing is intact to finger rub bilaterally. Neck is supple with full ROM    MOTOR EXAM: Normal bulk and tone throughout UE and LE bilaterally.   No pronator drift    Strength:  R deltoid 5/5, L deltoid 5/5  R biceps 5/5, L biceps 5/5  R triceps 5/5, L triceps 5/5  R finger flexors 5/5, L finger flexors 5/5  R finger extensors 5/5, L finger extensors 5/5  R finger abductors 5/5, L finger abductors 5/5  R  hip flexors 4+/5, L hip flexors 5/5  R knee extensors 5/5, L knee extensors 5/5  R knee flexors 5/5, L knee flexors 5/5  R ankle dorsiflexors 4/5, L ankle dorsiflexors 5/5  R ankle plantarflexors 5/5, L ankle plantarflexors 5/5      SENSORY EXAM: Normal to light touch and PP throughout.       COORDINATION: Moderate dysmetria with Rt FTN, mild dysmetria on L FTN,  Dysmetria on b/l HTS    GAIT: unsteady, circumduction of Rt leg, high steppage gait    Timed 25 Foot Walk: 4/3/2023 7/14/2023   Did patient wear an AFO? No No   Was assistive device used? No No   Time for 25 Foot Walk (seconds) 8.34 7.1   Time for 25 Foot Walk (seconds) 8.65 7.09         Imaging (personally reviewed):         Results for orders placed during the hospital encounter of 11/01/22    MRI Brain Demyelinating W W/O Contrast    Impression  Stable nonspecific white matter lesions consistent with the history of multiple sclerosis with no new or enhancing lesion identified.      Electronically signed by: Demetrius Phan  Date:    11/02/2022  Time:    08:25    Results for orders placed during the hospital encounter of 11/01/22    MRI Cervical Spine Demyelinating W W/O Contrast    Impression  Stable lesions within the cervical cord with no new or enhancing lesion.    In the thoracic cord there are lesions distally.  There is no prior study for comparison.  Minimal enhancement of one of the lesions is difficult to exclude but thought less likely with no  evidence of definite cord expansion.      Electronically signed by: Demetrius Phan  Date:    11/02/2022  Time:    10:00    Results for orders placed during the hospital encounter of 11/01/22    MRI Thoracic Spine Demyelinating W W/O Contrast    Impression  FINDINGS/  Please see the report of this MR imaging study of the cervical spine as these studies were dictated together.      Electronically signed by: Demetrius Phan  Date:    11/02/2022  Time:    12:09      Labs:     Lab Results   Component Value Date    ULJGUTCY87GV 52 12/07/2021     Lab Results   Component Value Date    JCVINDEX 1.34 (H) 10/12/2022    JCVANTIBODY POSITIVE (A) 10/12/2022     No results found for: NN2EZDCT, ABSOLUTECD3, JD5UVUVD, ABSOLUTECD8, CH0LAKHV, ABSOLUTECD4, LABCD48  Lab Results   Component Value Date    WBC 7.36 04/03/2023    RBC 5.03 04/03/2023    HGB 15.3 04/03/2023    HCT 47.1 04/03/2023    MCV 94 04/03/2023    MCH 30.4 04/03/2023    MCHC 32.5 04/03/2023    RDW 12.2 04/03/2023     04/03/2023    MPV 9.4 04/03/2023    GRAN 4.3 04/03/2023    GRAN 58.5 04/03/2023    LYMPH 2.0 04/03/2023    LYMPH 27.6 04/03/2023    MONO 0.7 04/03/2023    MONO 10.1 04/03/2023    EOS 0.2 04/03/2023    BASO 0.07 04/03/2023    EOSINOPHIL 2.4 04/03/2023    BASOPHIL 1.0 04/03/2023     Sodium   Date Value Ref Range Status   04/03/2023 139 136 - 145 mmol/L Final     Potassium   Date Value Ref Range Status   04/03/2023 4.3 3.5 - 5.1 mmol/L Final     Chloride   Date Value Ref Range Status   04/03/2023 104 95 - 110 mmol/L Final     CO2   Date Value Ref Range Status   04/03/2023 27 23 - 29 mmol/L Final     Glucose   Date Value Ref Range Status   04/03/2023 89 70 - 110 mg/dL Final     BUN   Date Value Ref Range Status   04/03/2023 9 6 - 20 mg/dL Final     Creatinine   Date Value Ref Range Status   04/03/2023 0.8 0.5 - 1.4 mg/dL Final     Calcium   Date Value Ref Range Status   04/03/2023 9.6 8.7 - 10.5 mg/dL Final     Total Protein   Date Value Ref Range Status    04/03/2023 7.6 6.0 - 8.4 g/dL Final     Albumin   Date Value Ref Range Status   04/03/2023 4.4 3.5 - 5.2 g/dL Final     Total Bilirubin   Date Value Ref Range Status   04/03/2023 0.6 0.1 - 1.0 mg/dL Final     Comment:     For infants and newborns, interpretation of results should be based  on gestational age, weight and in agreement with clinical  observations.    Premature Infant recommended reference ranges:  Up to 24 hours.............<8.0 mg/dL  Up to 48 hours............<12.0 mg/dL  3-5 days..................<15.0 mg/dL  6-29 days.................<15.0 mg/dL       Alkaline Phosphatase   Date Value Ref Range Status   04/03/2023 62 55 - 135 U/L Final     AST   Date Value Ref Range Status   04/03/2023 21 10 - 40 U/L Final     ALT   Date Value Ref Range Status   04/03/2023 31 10 - 44 U/L Final     Anion Gap   Date Value Ref Range Status   04/03/2023 8 8 - 16 mmol/L Final     eGFR if    Date Value Ref Range Status   12/07/2021 >60 >60 mL/min/1.73 m^2 Final     eGFR if non    Date Value Ref Range Status   12/07/2021 >60 >60 mL/min/1.73 m^2 Final     Comment:     Calculation used to obtain the estimated glomerular filtration  rate (eGFR) is the CKD-EPI equation.                   Diagnosis/Assessment/Plan:     Multiple Sclerosis  -Assessment:PPMS just started on Ocrevus and tolerating Ocrevus well, stable exam. Pending updated imaging.   -Imaging: MRI brain, c and t spine now  -Disease Modifying Therapies:Continue Ocrevus, safety labs ordered. Discussed trying clemastine off label for remyelination based on results from rebuild trial  Symptom management   -PT referral for AFO fitting and gait training, fall prevention   -CoQ10 for fatigue  Plan discussed and questions were answered to satisfaction.  Return to clinic in 6 months.      NEURO MULTIPLE SCLEROSIS IMPRESSION:   MS Status:     Number of relapses in the past year?:  0    Clinical Progression:  Clinically Stable    MRI  Progression:  Stable  Plan:     DMT:  No change in management    Symptom Management:  Implement change in symptom management    Implement Change in Symptom Management:  Adaptive Needs and Fatigue        Total time spent with the patient: 40 minutes, including face to face consultation, chart review and coordination of care, on the day of the visit. This includes face to face time and non-face to face time preparing to see the patient (eg, review of tests), obtaining and/or reviewing separately obtained history, documenting clinical information in the electronic or other health record, independently interpreting resultsand communicating results to the patient/family/caregiver, or care coordination.         Danica Anne MD, MSc  Attending neurologist

## 2023-07-14 NOTE — PATIENT INSTRUCTIONS
Clemastine - 5.36mg twice a day    Coenzyme Q10 - 500mg a day   PT referral    MRI now    Lab in Oct 2023  6 month follow up

## 2023-07-17 ENCOUNTER — TELEPHONE (OUTPATIENT)
Dept: NEUROLOGY | Facility: CLINIC | Age: 63
End: 2023-07-17
Payer: MEDICARE

## 2023-07-17 NOTE — TELEPHONE ENCOUNTER
----- Message from Dior Childs sent at 7/17/2023 10:39 AM CDT -----  Contact: [phone]  Hernandez Hernandez calling regarding Patient Advice (message) Pt states he came in to see the doctor on 07/14 and from his understating he was to go home with some instruction printed out with the details but did not get them.

## 2023-07-18 ENCOUNTER — TELEPHONE (OUTPATIENT)
Dept: NEUROLOGY | Facility: CLINIC | Age: 63
End: 2023-07-18
Payer: MEDICARE

## 2023-07-18 NOTE — TELEPHONE ENCOUNTER
----- Message from Sandra Patel sent at 7/18/2023  8:13 AM CDT -----  Regarding: copy  Contact: @ [phone]  Pt called in regards to seeing if he can get a copy of his after visit summary per the patient we were out of ink he said can you mail it to his home  .....Please call and adv @ [phone]

## 2023-07-21 ENCOUNTER — PATIENT MESSAGE (OUTPATIENT)
Dept: PSYCHIATRY | Facility: CLINIC | Age: 63
End: 2023-07-21
Payer: MEDICARE

## 2023-07-31 ENCOUNTER — HOSPITAL ENCOUNTER (OUTPATIENT)
Dept: RADIOLOGY | Facility: HOSPITAL | Age: 63
Discharge: HOME | End: 2023-07-31
Attending: STUDENT IN AN ORGANIZED HEALTH CARE EDUCATION/TRAINING PROGRAM
Payer: MEDICARE

## 2023-07-31 DIAGNOSIS — G35 MULTIPLE SCLEROSIS: Chronic | ICD-10-CM

## 2023-07-31 PROCEDURE — 70551 MRI BRAIN STEM W/O DYE: CPT | Mod: TC,HCWC

## 2023-07-31 PROCEDURE — 72146 MRI CHEST SPINE W/O DYE: CPT | Mod: TC,HCWC

## 2023-07-31 PROCEDURE — 72141 MRI NECK SPINE W/O DYE: CPT | Mod: TC,HCWC

## 2023-08-15 ENCOUNTER — TELEPHONE (OUTPATIENT)
Dept: NEUROLOGY | Facility: CLINIC | Age: 63
End: 2023-08-15
Payer: MEDICARE

## 2023-08-15 DIAGNOSIS — R26.9 GAIT DISTURBANCE: ICD-10-CM

## 2023-08-15 DIAGNOSIS — G35 MULTIPLE SCLEROSIS: Chronic | ICD-10-CM

## 2023-08-15 RX ORDER — DALFAMPRIDINE 10 MG/1
10 TABLET, FILM COATED, EXTENDED RELEASE ORAL EVERY 12 HOURS
Qty: 90 TABLET | Refills: 0 | Status: SHIPPED | OUTPATIENT
Start: 2023-08-15 | End: 2023-11-02 | Stop reason: SDUPTHER

## 2023-08-15 NOTE — TELEPHONE ENCOUNTER
----- Message from Myrna Paris RN sent at 8/14/2023  4:09 PM CDT -----  Regarding: FW: advisement  Contact: @[phone]    ----- Message -----  From: German Mays  Sent: 8/14/2023   1:13 PM CDT  To: [facility] Staff  Subject: advisement                                       Patient is calling because he has a few questions about his prescription dalfampridine 10 mg Tb12. Please call and advise @[phone]

## 2023-08-15 NOTE — TELEPHONE ENCOUNTER
Patient requesting 90 tablet prescription for dalfampridine for cost savings. Prescription for 90 tablet sent to [facility] per patients request

## 2023-08-29 DIAGNOSIS — R53.83 FATIGUE, UNSPECIFIED TYPE: ICD-10-CM

## 2023-08-29 DIAGNOSIS — G35 MULTIPLE SCLEROSIS: Chronic | ICD-10-CM

## 2023-08-30 RX ORDER — MODAFINIL 100 MG/1
100 TABLET ORAL
Qty: 90 TABLET | Refills: 1 | Status: SHIPPED | OUTPATIENT
Start: 2023-08-30 | End: 2024-03-18 | Stop reason: SDUPTHER

## 2023-09-02 DIAGNOSIS — Z86.69 HX OF MIGRAINE HEADACHES: ICD-10-CM

## 2023-09-05 RX ORDER — AMITRIPTYLINE HYDROCHLORIDE 25 MG/1
25 TABLET, FILM COATED ORAL NIGHTLY
Qty: 90 TABLET | Refills: 1 | Status: SHIPPED | OUTPATIENT
Start: 2023-09-05

## 2023-09-11 ENCOUNTER — TELEPHONE (OUTPATIENT)
Dept: NEUROLOGY | Facility: CLINIC | Age: 63
End: 2023-09-11
Payer: MEDICARE

## 2023-09-15 ENCOUNTER — DOCUMENTATION ONLY (OUTPATIENT)
Dept: NEUROLOGY | Facility: CLINIC | Age: 63
End: 2023-09-15
Payer: MEDICARE

## 2023-09-28 ENCOUNTER — TELEPHONE (OUTPATIENT)
Dept: NEUROLOGY | Facility: CLINIC | Age: 63
End: 2023-09-28
Payer: MEDICARE

## 2023-09-28 NOTE — TELEPHONE ENCOUNTER
----- Message from Tierney Triana RN sent at 9/28/2023  3:51 PM CDT -----  Regarding: FW: Infusion  Contact: [phone]    ----- Message -----  From: Myrna Paris RN  Sent: 9/28/2023   3:30 PM CDT  To: Tierney Triana RN; Darshan NgoD  Subject: FW: Infusion                                       ----- Message -----  From: Leslye Wren  Sent: 9/28/2023   2:51 PM CDT  To: Omid ZARAGOZA Staff  Subject: Infusion                                         Calling to schedule an appointment for infusion as soon as possible. Please call patient to schedule today.

## 2023-09-28 NOTE — TELEPHONE ENCOUNTER
----- Message from Marcie Dupree MA sent at 9/28/2023  9:08 AM CDT -----  Regarding: FW: infusion  Contact: @ [phone]    ----- Message -----  From: Myrna Paris RN  Sent: 9/27/2023  11:45 AM CDT  To: Marcie Dupree MA; Rosmery Rose MA  Subject: FW: infusion                                       ----- Message -----  From: Sandra Patel  Sent: 9/27/2023  11:01 AM CDT  To: Tierney Triana RN; Omid ZARAGOZA Staff  Subject: infusion                                         Pt requesting a appointment for the following looking for a infusion for his M.S also need to know location of the appointment   ...Please call and adv @ [phone]

## 2023-09-28 NOTE — TELEPHONE ENCOUNTER
Tried to call patient on preferred number. Call not able to go through    Called patient on mobile number and LVM. Patient can be scheduled for infusion after lab appt in October on 10/9.

## 2023-09-29 ENCOUNTER — TELEPHONE (OUTPATIENT)
Dept: NEUROLOGY | Facility: CLINIC | Age: 63
End: 2023-09-29
Payer: MEDICARE

## 2023-09-29 LAB — HEMOCCULT STL QL IA: NEGATIVE

## 2023-09-29 NOTE — TELEPHONE ENCOUNTER
Advised patient he can be scheduled for his infusion at [facility] after lab appt on 10/9. Discussed appt with CB on 11/9. Patient understanding and has no further questions

## 2023-09-29 NOTE — TELEPHONE ENCOUNTER
----- Message from Myrna Paris RN sent at 9/28/2023  4:25 PM CDT -----  Regarding: FW: pt advice  Contact: pt [phone]    ----- Message -----  From: Marleny Arroyo  Sent: 9/28/2023   4:05 PM CDT  To: Omid ZARAGOZA Staff  Subject: pt advice                                        Pt calling to schedule next infustion. [facility] for Friday 12/22    Pt returning callback from missed call. Requesting to speak with somebody in   office. Please call.

## 2023-10-18 NOTE — TELEPHONE ENCOUNTER
Patient was a no-show to lab appt on 10/9. Appt rescheduled till 10/20 at 10:30 am. Requested for appt reminder to be mailed for 11/9 appt with Addie

## 2023-10-25 ENCOUNTER — LAB VISIT (OUTPATIENT)
Dept: LAB | Facility: HOSPITAL | Age: 63
End: 2023-10-25
Attending: STUDENT IN AN ORGANIZED HEALTH CARE EDUCATION/TRAINING PROGRAM
Payer: MEDICARE

## 2023-10-25 DIAGNOSIS — G35 MULTIPLE SCLEROSIS: Chronic | ICD-10-CM

## 2023-10-25 LAB
ALBUMIN SERPL BCP-MCNC: 4.3 G/DL (ref 3.5–5.2)
ALP SERPL-CCNC: 77 U/L (ref 55–135)
ALT SERPL W/O P-5'-P-CCNC: 30 U/L (ref 10–44)
ANION GAP SERPL CALC-SCNC: 14 MMOL/L (ref 8–16)
AST SERPL-CCNC: 22 U/L (ref 10–40)
BASOPHILS # BLD AUTO: 0.08 K/UL (ref 0–0.2)
BASOPHILS NFR BLD: 1 % (ref 0–1.9)
BILIRUB SERPL-MCNC: 0.5 MG/DL (ref 0.1–1)
BUN SERPL-MCNC: 11 MG/DL (ref 8–23)
CALCIUM SERPL-MCNC: 9.6 MG/DL (ref 8.7–10.5)
CHLORIDE SERPL-SCNC: 101 MMOL/L (ref 95–110)
CO2 SERPL-SCNC: 24 MMOL/L (ref 23–29)
CREAT SERPL-MCNC: 0.9 MG/DL (ref 0.5–1.4)
DIFFERENTIAL METHOD: ABNORMAL
EOSINOPHIL # BLD AUTO: 0.2 K/UL (ref 0–0.5)
EOSINOPHIL NFR BLD: 2.7 % (ref 0–8)
ERYTHROCYTE [DISTWIDTH] IN BLOOD BY AUTOMATED COUNT: 12.3 % (ref 11.5–14.5)
EST. GFR  (NO RACE VARIABLE): >60 ML/MIN/1.73 M^2
GLUCOSE SERPL-MCNC: 123 MG/DL (ref 70–110)
HBV CORE AB SERPL QL IA: NORMAL
HBV SURFACE AG SERPL QL IA: NORMAL
HCT VFR BLD AUTO: 47.2 % (ref 40–54)
HGB BLD-MCNC: 15.9 G/DL (ref 14–18)
IGA SERPL-MCNC: 344 MG/DL (ref 40–350)
IGG SERPL-MCNC: 1152 MG/DL (ref 650–1600)
IGM SERPL-MCNC: 28 MG/DL (ref 50–300)
IMM GRANULOCYTES # BLD AUTO: 0.08 K/UL (ref 0–0.04)
IMM GRANULOCYTES NFR BLD AUTO: 1 % (ref 0–0.5)
LYMPHOCYTES # BLD AUTO: 2.2 K/UL (ref 1–4.8)
LYMPHOCYTES NFR BLD: 27.8 % (ref 18–48)
MCH RBC QN AUTO: 30.6 PG (ref 27–31)
MCHC RBC AUTO-ENTMCNC: 33.7 G/DL (ref 32–36)
MCV RBC AUTO: 91 FL (ref 82–98)
MONOCYTES # BLD AUTO: 0.9 K/UL (ref 0.3–1)
MONOCYTES NFR BLD: 11.6 % (ref 4–15)
NEUTROPHILS # BLD AUTO: 4.4 K/UL (ref 1.8–7.7)
NEUTROPHILS NFR BLD: 55.9 % (ref 38–73)
NRBC BLD-RTO: 0 /100 WBC
PLATELET # BLD AUTO: 268 K/UL (ref 150–450)
PMV BLD AUTO: 8.9 FL (ref 9.2–12.9)
POTASSIUM SERPL-SCNC: 3.9 MMOL/L (ref 3.5–5.1)
PROT SERPL-MCNC: 7.7 G/DL (ref 6–8.4)
RBC # BLD AUTO: 5.19 M/UL (ref 4.6–6.2)
SODIUM SERPL-SCNC: 139 MMOL/L (ref 136–145)
WBC # BLD AUTO: 7.78 K/UL (ref 3.9–12.7)

## 2023-10-25 PROCEDURE — 82784 ASSAY IGA/IGD/IGG/IGM EACH: CPT | Mod: HCWC | Performed by: STUDENT IN AN ORGANIZED HEALTH CARE EDUCATION/TRAINING PROGRAM

## 2023-10-25 PROCEDURE — 87340 HEPATITIS B SURFACE AG IA: CPT | Mod: HCWC | Performed by: STUDENT IN AN ORGANIZED HEALTH CARE EDUCATION/TRAINING PROGRAM

## 2023-10-25 PROCEDURE — 36415 COLL VENOUS BLD VENIPUNCTURE: CPT | Mod: HCWC | Performed by: STUDENT IN AN ORGANIZED HEALTH CARE EDUCATION/TRAINING PROGRAM

## 2023-10-25 PROCEDURE — 80053 COMPREHEN METABOLIC PANEL: CPT | Mod: HCWC | Performed by: STUDENT IN AN ORGANIZED HEALTH CARE EDUCATION/TRAINING PROGRAM

## 2023-10-25 PROCEDURE — 85025 COMPLETE CBC W/AUTO DIFF WBC: CPT | Mod: HCWC | Performed by: STUDENT IN AN ORGANIZED HEALTH CARE EDUCATION/TRAINING PROGRAM

## 2023-10-25 PROCEDURE — 86704 HEP B CORE ANTIBODY TOTAL: CPT | Mod: HCWC | Performed by: STUDENT IN AN ORGANIZED HEALTH CARE EDUCATION/TRAINING PROGRAM

## 2023-11-02 ENCOUNTER — TELEPHONE (OUTPATIENT)
Dept: NEUROLOGY | Facility: CLINIC | Age: 63
End: 2023-11-02
Payer: MEDICARE

## 2023-11-02 DIAGNOSIS — R26.9 GAIT DISTURBANCE: ICD-10-CM

## 2023-11-02 DIAGNOSIS — G35 MULTIPLE SCLEROSIS: Chronic | ICD-10-CM

## 2023-11-02 RX ORDER — DALFAMPRIDINE 10 MG/1
10 TABLET, FILM COATED, EXTENDED RELEASE ORAL EVERY 12 HOURS
Qty: 60 TABLET | Refills: 5 | Status: SHIPPED | OUTPATIENT
Start: 2023-11-02 | End: 2023-11-20

## 2023-11-02 NOTE — TELEPHONE ENCOUNTER
Labs 10/25/22  WBC 7.78  ALC 2163  AST 22, ALT  30  IgG  1152 IgM  28 IgA 344  HBsAg - anti-Hbc -, no current or past infection

## 2023-11-02 NOTE — TELEPHONE ENCOUNTER
----- Message from Tarsha Leo sent at 11/2/2023  1:30 PM CDT -----  Regarding: Refill request  Contact: [phone]  Rx Refill/Request         Is this a Refill or New Rx:  dalfampridine 10 mg Tb12        Preferred Pharmacy with phone number:       [facility] - [place], [place] - [address]  [address] [place] [address]  [facility] [address]  Phone: [phone] Fax: [phone]

## 2023-11-08 ENCOUNTER — TELEPHONE (OUTPATIENT)
Dept: NEUROLOGY | Facility: CLINIC | Age: 63
End: 2023-11-08
Payer: MEDICARE

## 2023-11-08 NOTE — TELEPHONE ENCOUNTER
Spoke to patient about scheduling Ocrevus. Unsure of availability but wants to hold appt on 12/1. Will f/u on 11/9

## 2023-11-08 NOTE — TELEPHONE ENCOUNTER
Spoke with pt in regards to rescheduling his appt. Rescheduled pt to 11/20/23 at 9:30 am with Addie. Pt verbalized understanding.

## 2023-11-09 NOTE — TELEPHONE ENCOUNTER
Spoke to patient regarding Ocrevus scheduling. Patient unable to infuse on 12/1. Requesting 12/22 as this is the only day available for the infusion when [facility] can bring him. Explained this is one month past when he is due. Patient states he rather be safe and [facility] can only bring him on this day. Confirmed appt for 12/22 at 8 am.     Update site of infusion to liset with [facility]    Therapy plan routed to SHILOH TOLLIVER for signature

## 2023-11-20 ENCOUNTER — OFFICE VISIT (OUTPATIENT)
Dept: NEUROLOGY | Facility: CLINIC | Age: 63
End: 2023-11-20
Payer: MEDICARE

## 2023-11-20 VITALS
SYSTOLIC BLOOD PRESSURE: 120 MMHG | WEIGHT: 169.75 LBS | DIASTOLIC BLOOD PRESSURE: 74 MMHG | BODY MASS INDEX: 28.98 KG/M2 | HEIGHT: 64 IN | HEART RATE: 80 BPM

## 2023-11-20 DIAGNOSIS — G35 MULTIPLE SCLEROSIS: Primary | Chronic | ICD-10-CM

## 2023-11-20 DIAGNOSIS — R26.9 GAIT DISORDER: ICD-10-CM

## 2023-11-20 PROCEDURE — 99215 OFFICE O/P EST HI 40 MIN: CPT | Mod: HCWC,S$GLB,,

## 2023-11-20 PROCEDURE — 1160F RVW MEDS BY RX/DR IN RCRD: CPT | Mod: HCWC,CPTII,S$GLB,

## 2023-11-20 PROCEDURE — 3078F DIAST BP <80 MM HG: CPT | Mod: HCWC,CPTII,S$GLB,

## 2023-11-20 PROCEDURE — 3074F PR MOST RECENT SYSTOLIC BLOOD PRESSURE < 130 MM HG: ICD-10-PCS | Mod: HCWC,CPTII,S$GLB,

## 2023-11-20 PROCEDURE — 99417 PR PROLONGED SVC, OUTPT, W/WO DIRECT PT CONTACT,  EA ADDTL 15 MIN: ICD-10-PCS | Mod: HCWC,S$GLB,,

## 2023-11-20 PROCEDURE — 99417 PROLNG OP E/M EACH 15 MIN: CPT | Mod: HCWC,S$GLB,,

## 2023-11-20 PROCEDURE — 3008F BODY MASS INDEX DOCD: CPT | Mod: HCWC,CPTII,S$GLB,

## 2023-11-20 PROCEDURE — 99999 PR PBB SHADOW E&M-EST. PATIENT-LVL IV: ICD-10-PCS | Mod: PBBFAC,HCWC,,

## 2023-11-20 PROCEDURE — 99999 PR PBB SHADOW E&M-EST. PATIENT-LVL IV: CPT | Mod: PBBFAC,HCWC,,

## 2023-11-20 PROCEDURE — 1159F MED LIST DOCD IN RCRD: CPT | Mod: HCWC,CPTII,S$GLB,

## 2023-11-20 PROCEDURE — 3078F PR MOST RECENT DIASTOLIC BLOOD PRESSURE < 80 MM HG: ICD-10-PCS | Mod: HCWC,CPTII,S$GLB,

## 2023-11-20 PROCEDURE — 99215 PR OFFICE/OUTPT VISIT, EST, LEVL V, 40-54 MIN: ICD-10-PCS | Mod: HCWC,S$GLB,,

## 2023-11-20 PROCEDURE — 1159F PR MEDICATION LIST DOCUMENTED IN MEDICAL RECORD: ICD-10-PCS | Mod: HCWC,CPTII,S$GLB,

## 2023-11-20 PROCEDURE — 3074F SYST BP LT 130 MM HG: CPT | Mod: HCWC,CPTII,S$GLB,

## 2023-11-20 PROCEDURE — 1160F PR REVIEW ALL MEDS BY PRESCRIBER/CLIN PHARMACIST DOCUMENTED: ICD-10-PCS | Mod: HCWC,CPTII,S$GLB,

## 2023-11-20 PROCEDURE — 3008F PR BODY MASS INDEX (BMI) DOCUMENTED: ICD-10-PCS | Mod: HCWC,CPTII,S$GLB,

## 2023-11-20 RX ORDER — DALFAMPRIDINE 10 MG/1
10 TABLET, FILM COATED, EXTENDED RELEASE ORAL 2 TIMES DAILY
Qty: 60 TABLET | Refills: 5 | Status: SHIPPED | OUTPATIENT
Start: 2023-11-20

## 2023-11-20 NOTE — PATIENT INSTRUCTIONS
MRIs in January     Labs and follow up in 6 months with Dr. Anne    Look up shower chairs     Contact PT to set up appointment, let us know if another referral is needed.  Have them set up everything for AFO    Use rollator a little more.

## 2023-11-20 NOTE — PROGRESS NOTES
Patient ID: Hernandez Hernandez is a 63 y.o. male who presents today for a routine clinic visit for MS.  He was last seen by Dr. Anne on 7/14/2023.  The history was provided by the patient.     Principle neurological diagnosis: MS  Date of symptom onset: 1998  Date of diagnosis: 1998  Disease type at diagnosis: Progressive  Disease type currently: Progressive, without relapses, but active progression  Previous therapy: Avonex (s/e's) 1999 - 2006  Current therapy: Ocrevus 5/2023 - present  Vitamin D Dose: 5000IU daily   Last MRI Brain: 7/31/2023 - stable   Last MRI C-spine: 7/31/2023 - stable   Last MRI T-spine: 7/31/2023 - stable   CSF: NA  JCV status: 10/12/2022: positive (1.34)  Other relevant labs and tests: Vitamin D 12/7/2021: 52     Subjective:     He states that he is doing ok especially now that the weather is changing and not as hot.      He has not gone to PT for AFO evaluation.  He does continue to have issues with drop foot on the right     He has stopped dalfampridine, but is willing to restart.     He does have a rollator and it does help, but he does not use it all the time, only for longer distances.  He continues to have falls when he is not using rollator.     ROS:      11/19/2023     9:48 PM   REVIEW OF SYMPTOMS   Do you feel abnormally tired on most days? No   Do you feel you generally sleep well? Yes   Do you have difficulty controlling your bladder?  No   Do you have difficulty controlling your bowels?  No   Do you have frequent muscle cramps, tightness or spasms in your limbs?  No   Do you have new visual symptoms?  No   Do you have worsening difficulty with your memory or thinking? No   Do you have worsening symptoms of anxiety or depression?  No   For patients who walk, Do you have more difficulty walking?  No   Have you fallen since your last visit?  Yes    For patients who use wheelchairs: Do you have any skin wounds or breakdown? No   Do you have difficulty using your hands?  Yes   Do you have  shooting or burning pain? No   Do you have difficulty with sexual function?  No   If you are sexually active, are you using birth control? Y/N  N/A No   Do you often choke when swallowing liquids or solid food?  No   Do you experience worsening symptoms when overheated? Yes   Do you need any new equipment such as a wheelchair, walker or shower chair? No   Do you receive co-pay financial assistance for your principal MS medicine? Yes    Would you be interested in participating in an MS research trial in the future? Yes   For patients on Gilenya, Tecfidera, Aubagio, Rituxan, Ocrevus, Tysabri, Lemtrada or Methotrexate, are you aware that you should NOT receive live virus vaccines?  Yes - discussed with patient    Do you feel you have adequate family/friend support?  Yes   Do you have health insurance?   Yes    Are you currently employed? No   Do you receive SSDI/SSI?  Yes   Do you use marijuana or cannabis products? No   Have you been diagnosed with a urinary tract infection since your last visit here? No   Have you been diagnosed with a respiratory tract infection since your last visit here? No   Have you been to the emergency room since your last visit here? No   Have you been hospitalized since your last visit here?  No            11/19/2023    10:03 PM   FSS SCORE & INTERPRETATION   FSS SCORE  21   FSS SCORE INTERPRETATION May not be suffering from fatigue         11/19/2023     9:59 PM   MS BARI-D SCORE & INTERPRETATION   BARI-D SCORE  3   BARI-D INTERPRETATION  No indication of Depression         11/19/2023     9:52 PM   MS EDA-7 SCORE & INTERPRETATION   EDA-7 SCORE  0   EDA-7 SCORE INTERPRETATION Normal         11/19/2023    10:07 PM   PEQ MS MOS PAIN EFFECTS SCORE & INTERPRETATION   PES SCORE 7   PES SCORE INTERPRETATION Scores can range from 6-30.  Items are scaled so that higher scores indicate a greater impact of pain on a patients mood and behavior.         11/19/2023    10:10 PM   PEQ MS SEXUAL SATISFACTION  SCORE & INTERPRETATION   SSS SCORE  6   SSS SCORE INTERPRETATION Scores can range from 4-24.  Higher scores indicate greater problems with sexual satisfaction.         11/19/2023    10:12 PM   MS BLADDER CONTROL SCORE & INTERPRETATION   BLCS SCORE 0   BLCS SCORE INTERPRETATION  Scores can range from 0-22, with higher scores indicating greater bladder control problems.         11/19/2023    10:20 PM   MS BOWEL CONTROL SCORE & INTERPRETATION   BWCS SCORE 0   BWCS SCORE INTERPRETATION Scores can range from 0-26, with higher scores indicating greater bowel control problems.         11/19/2023    10:14 PM   PEQ MS IMPACT OF VISUAL IMPAIRMENT SCORE & INTERPRETATION   SANTOS SCALE SCORE  0   SANTOS SCORE INTERPRETATION Scores can range from 0-15, with higher scores indicating greater impact of visual problems on daily activites.         11/19/2023    10:18 PM   MS PDQ SCORE & INTERPRETATION   PDQ RETROSPECTIVE MEMORY SUBSCALE 3   PDQ ATTENTION/CONCENTRATION SUBSCALE 2   PDQ PROSPECTIVE MEMORY SUBSCALE 2   PDQ PLANNING/ORGANIZATION SUBSCALE 2   PDQ TOTAL SCORE 9   PDQ SCORE INTERPRETATION Scores can range from 0-80, with higher scores indicating greater perceived cognitive impairment.         11/19/2023    10:24 PM   MSSS SCORE & INTERPRETATION   MSSS TANGIBLE SUPPORT SUBSCALE 0   MSSS EMOTIONAL/INFORMATIONAL SUPPORT SUBSCALE 0   MSSS AFFECTIONATE SUPPORT SUBSCALE 0   MSSS POSITIVE SOCIAL INTERACTION SUBSCALE 0   MSSS TOTAL SCORE 0   MSSS SCORE INTERPRETATION Scores can range from 0-100, with higher scores indicating greater perceived support.        SOCIAL HISTORY  Living arrangements - the patient lives with their partner.  Social History     Socioeconomic History    Marital status:     Number of children: 2   Occupational History    Occupation:     Occupation: [employer]    Occupation: currently unempl   Tobacco Use    Smoking status: Never    Smokeless tobacco: Never   Substance and Sexual Activity    Alcohol use:  Yes     Alcohol/week: 3.0 standard drinks of alcohol     Types: 2 Glasses of wine, 1 Cans of beer per week     Comment: occassionally    Drug use: No       Current Outpatient Medications on File Prior to Visit   Medication Sig Dispense Refill    amitriptyline (ELAVIL) 25 MG tablet TAKE 1 TABLET EVERY EVENING 90 tablet 1    b complex vitamins tablet Take 1 tablet by mouth once daily.      cholecalciferol, vitamin D3, 125 mcg (5,000 unit) Tab Take 5,000 Units by mouth once daily.      magnesium 250 mg Tab Take 1 tablet by mouth once.      melatonin 1 mg Tab Take 2 tablets by mouth once daily.      modafiniL (PROVIGIL) 100 MG Tab TAKE 1 TABLET EVERY DAY 90 tablet 1     No current facility-administered medications on file prior to visit.       Objective:     1. 25 foot timed walk:      7/14/2023    12:01 AM 11/20/2023    12:01 AM   Timed 25 Foot Walk:   Did patient wear an AFO? No No   Was assistive device used? No No   Time for 25 Foot Walk (seconds) 7.1 8.36   Time for 25 Foot Walk (seconds) 7.09 7.39           NEURO EXAM    In general, the patient is well nourished and appears to be in no acute distress.    MENTAL STATUS: language is fluent, normal verbal comprehension, short-term and remote memory is intact, attention is normal, patient is alert and oriented x 3, fund of knowlege is appropriate by vocabulary.     CRANIAL NERVE EXAM:  There is no internuclear ophthalmoplegia.  Extraocular muscles are intact. No facial asymmetry. Facial sensation is intact bilaterally. There is no dysarthria. Uvula is midline, and palate moves symmetrically. Shoulder shrug intact bilaterlly. Tongue protrusion is midline. Hearing is intact to finger rub bilaterally. Neck is supple with full ROM    MOTOR EXAM: Normal bulk and tone throughout UE and LE bilaterally.   Rapid sequential movements are normal; Strength is as follows:     Strength:  R deltoid 5/5, L deltoid 5/5  R biceps 5/5, L biceps 5/5  R triceps 5/5, L triceps 5/5  R wrist  flexors 5/5, L wrist flexors 5/5  R wrist extensors 5/5, L wrist extensors 5/5  R finger abductors 5/5, L finger abductors 5/5  R  hip flexors 3+/5, L hip flexors 4+/5  R knee extensors 5/5, L knee extensors 5/5  R knee flexors /5, L knee flexors 5/5  R ankle dorsiflexors 5/5, L ankle dorsiflexors 5/5  R ankle plantarflexors 5/5, L ankle plantarflexors 5/5    SENSORY EXAM: Normal to light touch and vibration throughout.    COORDINATION: Moderate dysmetria with right finger to nose and mild dysmetria on the left,  Dysmetria on bilateral HTS     GAIT: wide base, unsteady, circumduction of right leg       Imaging:     Personally reviewed and discussed with patient     Results for orders placed during the hospital encounter of 07/31/23    MRI Brain Demyelinating Without Contrast    Impression  1. Stable T2/FLAIR hyperintense lesions involving the periventricular subcortical white matter as well as the  elio in this patient with reported history of multiple sclerosis.  No new lesions identified.  2. Stable T2 hyperintense cord lesions involving the cervical and thoracic spine.  No cord expansion.  No new lesions identified.  3. Stable mild cervical spine degenerative changes as detailed above.    Electronically signed by resident: Tara Moon  Date:    07/31/2023  Time:    09:02    Electronically signed by: Demetrius Phan  Date:    07/31/2023  Time:    11:22    Results for orders placed during the hospital encounter of 07/31/23    MRI Cervical Spine Demyelinating Without Contrast    Impression  1. Stable T2/FLAIR hyperintense lesions involving the periventricular subcortical white matter as well as the  elio in this patient with reported history of multiple sclerosis.  No new lesions identified.  2. Stable T2 hyperintense cord lesions involving the cervical and thoracic spine.  No cord expansion.  No new lesions identified.  3. Stable mild cervical spine degenerative changes as detailed above.    Electronically signed  by resident: Tara Moon  Date:    07/31/2023  Time:    09:02    Electronically signed by: Demetrius Phan  Date:    07/31/2023  Time:    11:22    Results for orders placed during the hospital encounter of 07/31/23    MRI Thoracic Spine Demyelinating Without Contrast    Impression  1. Stable T2/FLAIR hyperintense lesions involving the periventricular subcortical white matter as well as the  elio in this patient with reported history of multiple sclerosis.  No new lesions identified.  2. Stable T2 hyperintense cord lesions involving the cervical and thoracic spine.  No cord expansion.  No new lesions identified.  3. Stable mild cervical spine degenerative changes as detailed above.    Electronically signed by resident: Tara Moon  Date:    07/31/2023  Time:    09:02    Electronically signed by: Demetrius Phan  Date:    07/31/2023  Time:    11:22    Results for orders placed during the hospital encounter of 11/01/22    MRI Brain Demyelinating W W/O Contrast    Impression  Stable nonspecific white matter lesions consistent with the history of multiple sclerosis with no new or enhancing lesion identified.      Electronically signed by: Demetrius Phan  Date:    11/02/2022  Time:    08:25    Results for orders placed during the hospital encounter of 11/01/22    MRI Cervical Spine Demyelinating W W/O Contrast    Impression  Stable lesions within the cervical cord with no new or enhancing lesion.    In the thoracic cord there are lesions distally.  There is no prior study for comparison.  Minimal enhancement of one of the lesions is difficult to exclude but thought less likely with no evidence of definite cord expansion.      Electronically signed by: Demetrius Phan  Date:    11/02/2022  Time:    10:00    Results for orders placed during the hospital encounter of 11/01/22    MRI Thoracic Spine Demyelinating W W/O Contrast    Impression  FINDINGS/  Please see the report of this MR imaging study of the cervical spine as  "these studies were dictated together.      Electronically signed by: Demetrius Phan  Date:    11/02/2022  Time:    12:09        Labs:     Lab Results   Component Value Date    TDTCMTQC10SE 52 12/07/2021     Lab Results   Component Value Date    JCVINDEX 1.34 (H) 10/12/2022    JCVANTIBODY POSITIVE (A) 10/12/2022     No results found for: "IQ1QBQXL", "ABSOLUTECD3", "AS0AWMHD", "ABSOLUTECD8", "BI9VNQRP", "ABSOLUTECD4", "LABCD48"  Lab Results   Component Value Date    WBC 7.78 10/25/2023    RBC 5.19 10/25/2023    HGB 15.9 10/25/2023    HCT 47.2 10/25/2023    MCV 91 10/25/2023    MCH 30.6 10/25/2023    MCHC 33.7 10/25/2023    RDW 12.3 10/25/2023     10/25/2023    MPV 8.9 (L) 10/25/2023    GRAN 4.4 10/25/2023    GRAN 55.9 10/25/2023    LYMPH 2.2 10/25/2023    LYMPH 27.8 10/25/2023    MONO 0.9 10/25/2023    MONO 11.6 10/25/2023    EOS 0.2 10/25/2023    BASO 0.08 10/25/2023    EOSINOPHIL 2.7 10/25/2023    BASOPHIL 1.0 10/25/2023     Sodium   Date Value Ref Range Status   10/25/2023 139 136 - 145 mmol/L Final     Potassium   Date Value Ref Range Status   10/25/2023 3.9 3.5 - 5.1 mmol/L Final     Chloride   Date Value Ref Range Status   10/25/2023 101 95 - 110 mmol/L Final     CO2   Date Value Ref Range Status   10/25/2023 24 23 - 29 mmol/L Final     Glucose   Date Value Ref Range Status   10/25/2023 123 (H) 70 - 110 mg/dL Final     BUN   Date Value Ref Range Status   10/25/2023 11 8 - 23 mg/dL Final     Creatinine   Date Value Ref Range Status   10/25/2023 0.9 0.5 - 1.4 mg/dL Final     Calcium   Date Value Ref Range Status   10/25/2023 9.6 8.7 - 10.5 mg/dL Final     Total Protein   Date Value Ref Range Status   10/25/2023 7.7 6.0 - 8.4 g/dL Final     Albumin   Date Value Ref Range Status   10/25/2023 4.3 3.5 - 5.2 g/dL Final     Total Bilirubin   Date Value Ref Range Status   10/25/2023 0.5 0.1 - 1.0 mg/dL Final     Comment:     For infants and newborns, interpretation of results should be based  on gestational age, " weight and in agreement with clinical  observations.    Premature Infant recommended reference ranges:  Up to 24 hours.............<8.0 mg/dL  Up to 48 hours............<12.0 mg/dL  3-5 days..................<15.0 mg/dL  6-29 days.................<15.0 mg/dL       Alkaline Phosphatase   Date Value Ref Range Status   10/25/2023 77 55 - 135 U/L Final     AST   Date Value Ref Range Status   10/25/2023 22 10 - 40 U/L Final     ALT   Date Value Ref Range Status   10/25/2023 30 10 - 44 U/L Final     Anion Gap   Date Value Ref Range Status   10/25/2023 14 8 - 16 mmol/L Final     eGFR if    Date Value Ref Range Status   12/07/2021 >60 >60 mL/min/1.73 m^2 Final     eGFR if non    Date Value Ref Range Status   12/07/2021 >60 >60 mL/min/1.73 m^2 Final     Comment:     Calculation used to obtain the estimated glomerular filtration  rate (eGFR) is the CKD-EPI equation.        Lab Results   Component Value Date    HEPBSAG Non-reactive 10/25/2023    HEPBSAB <3.00 10/12/2022    HEPBSAB Non-reactive 10/12/2022    HEPBCAB Non-reactive 10/25/2023           MS Impression and Plan:     NEURO MULTIPLE SCLEROSIS IMPRESSION:   MS Status:     Number of relapses in the past year?:  0    Clinical Progression:  Worsened    Clinical Progression comment:  Slower timed walk, slight decrease in strength, but will account for subjective findings between providers.     Type:  Progressive    MRI Progression:  Stable  Plan:     DMT:  No change in management    DMT comment:  Continue Ocrevus and vitamin D Supplementation. He is aware of the risks associated with immunosuppressant therapy, including increased risk of infection.       Symptom Management:  Implement change in symptom management    Implement Change in Symptom Management:  Gait and Adaptive Needs (will have patient us rollator more often, restart PT and get assessed for AFO, patient to restart dalfampridine, patient to research show chairs)       Labs in  May  MRIs in January   Follow up with Dr. Anne in 6 months   Plan discussed and questions were answered to satisfaction.     Problem List Items Addressed This Visit          Neuro    Multiple sclerosis - Primary (Chronic)    Relevant Medications    dalfampridine 10 mg Tb12    Other Relevant Orders    MRI Brain Demyelinating Without Contrast    MRI Cervical Spine Demyelinating Without Contrast    MRI Thoracic Spine Demyelinating Without Contrast    CBC Auto Differential    Comprehensive Metabolic Panel    Hepatitis B Core Antibody, Total    Hepatitis B Surface Antigen    Immunoglobulins (IgG, IgA, IgM) Quantitative       Other    Gait disorder    Relevant Medications    dalfampridine 10 mg Tb12       I spent a total of 70 minutes on the day of the visit.This includes face to face time and non-face to face time preparing to see the patient (eg, review of tests), obtaining and/or reviewing separately obtained history, documenting clinical information in the electronic or other health record, independently interpreting results and communicating results to the patient/family/caregiver, or care coordinator.       Addie Chowdhury, RUBIN-C

## 2023-12-19 ENCOUNTER — TELEPHONE (OUTPATIENT)
Dept: INFUSION THERAPY | Facility: HOSPITAL | Age: 63
End: 2023-12-19
Payer: MEDICARE

## 2023-12-22 ENCOUNTER — INFUSION (OUTPATIENT)
Dept: INFUSION THERAPY | Facility: HOSPITAL | Age: 63
End: 2023-12-22
Payer: MEDICARE

## 2023-12-22 VITALS
RESPIRATION RATE: 18 BRPM | SYSTOLIC BLOOD PRESSURE: 135 MMHG | WEIGHT: 169.75 LBS | BODY MASS INDEX: 28.98 KG/M2 | HEIGHT: 64 IN | HEART RATE: 77 BPM | TEMPERATURE: 98 F | DIASTOLIC BLOOD PRESSURE: 75 MMHG | OXYGEN SATURATION: 100 %

## 2023-12-22 DIAGNOSIS — G35 MULTIPLE SCLEROSIS: Primary | ICD-10-CM

## 2023-12-22 PROCEDURE — 96375 TX/PRO/DX INJ NEW DRUG ADDON: CPT | Mod: HCWC

## 2023-12-22 PROCEDURE — 96413 CHEMO IV INFUSION 1 HR: CPT | Mod: HCWC

## 2023-12-22 PROCEDURE — 96415 CHEMO IV INFUSION ADDL HR: CPT | Mod: HCWC

## 2023-12-22 PROCEDURE — 63600175 PHARM REV CODE 636 W HCPCS: Mod: HCWC | Performed by: STUDENT IN AN ORGANIZED HEALTH CARE EDUCATION/TRAINING PROGRAM

## 2023-12-22 PROCEDURE — 25000003 PHARM REV CODE 250: Mod: HCWC | Performed by: STUDENT IN AN ORGANIZED HEALTH CARE EDUCATION/TRAINING PROGRAM

## 2023-12-22 PROCEDURE — 96367 TX/PROPH/DG ADDL SEQ IV INF: CPT | Mod: HCWC

## 2023-12-22 RX ORDER — FAMOTIDINE 10 MG/ML
20 INJECTION INTRAVENOUS
Status: COMPLETED | OUTPATIENT
Start: 2023-12-22 | End: 2023-12-22

## 2023-12-22 RX ORDER — DIPHENHYDRAMINE HYDROCHLORIDE 50 MG/ML
50 INJECTION INTRAMUSCULAR; INTRAVENOUS
OUTPATIENT
Start: 2024-03-22

## 2023-12-22 RX ORDER — HEPARIN 100 UNIT/ML
500 SYRINGE INTRAVENOUS
Status: DISCONTINUED | OUTPATIENT
Start: 2023-12-22 | End: 2023-12-22 | Stop reason: HOSPADM

## 2023-12-22 RX ORDER — DIPHENHYDRAMINE HYDROCHLORIDE 50 MG/ML
50 INJECTION INTRAMUSCULAR; INTRAVENOUS
Status: DISCONTINUED | OUTPATIENT
Start: 2023-12-22 | End: 2023-12-22 | Stop reason: HOSPADM

## 2023-12-22 RX ORDER — SODIUM CHLORIDE 0.9 % (FLUSH) 0.9 %
10 SYRINGE (ML) INJECTION
OUTPATIENT
Start: 2024-03-22

## 2023-12-22 RX ORDER — EPINEPHRINE 0.3 MG/.3ML
0.3 INJECTION SUBCUTANEOUS
Status: DISCONTINUED | OUTPATIENT
Start: 2023-12-22 | End: 2023-12-22 | Stop reason: HOSPADM

## 2023-12-22 RX ORDER — ACETAMINOPHEN 500 MG
1000 TABLET ORAL
OUTPATIENT
Start: 2024-03-22

## 2023-12-22 RX ORDER — EPINEPHRINE 0.3 MG/.3ML
0.3 INJECTION SUBCUTANEOUS
OUTPATIENT
Start: 2024-03-22

## 2023-12-22 RX ORDER — SODIUM CHLORIDE 0.9 % (FLUSH) 0.9 %
10 SYRINGE (ML) INJECTION
Status: DISCONTINUED | OUTPATIENT
Start: 2023-12-22 | End: 2023-12-22 | Stop reason: HOSPADM

## 2023-12-22 RX ORDER — HEPARIN 100 UNIT/ML
500 SYRINGE INTRAVENOUS
OUTPATIENT
Start: 2024-03-22

## 2023-12-22 RX ORDER — FAMOTIDINE 10 MG/ML
20 INJECTION INTRAVENOUS
OUTPATIENT
Start: 2024-03-22

## 2023-12-22 RX ORDER — ACETAMINOPHEN 500 MG
1000 TABLET ORAL
Status: COMPLETED | OUTPATIENT
Start: 2023-12-22 | End: 2023-12-22

## 2023-12-22 RX ADMIN — FAMOTIDINE 20 MG: 10 INJECTION INTRAVENOUS at 08:12

## 2023-12-22 RX ADMIN — ACETAMINOPHEN 1000 MG: 500 TABLET ORAL at 08:12

## 2023-12-22 RX ADMIN — OCRELIZUMAB 600 MG: 300 INJECTION INTRAVENOUS at 08:12

## 2023-12-22 RX ADMIN — DEXTROSE 100 MG: 50 INJECTION, SOLUTION INTRAVENOUS at 08:12

## 2023-12-22 RX ADMIN — DIPHENHYDRAMINE HYDROCHLORIDE 50 MG: 50 INJECTION, SOLUTION INTRAMUSCULAR; INTRAVENOUS at 08:12

## 2023-12-22 NOTE — PLAN OF CARE
Pt received Ocrevus today and tolerated well, without complications. VSS throughout infusion. Educated patient about Ocrevus (indications, side effects, possible reactions,  precautions) and verbalized understanding. PIV positive for blood return, saline locked and removed prior to DC, catheter tip intact. Pt DC with no distress noted, WC off unit w/o event, via fx, pleased. Pt opted to not wait the full obs time.

## 2024-01-22 ENCOUNTER — HOSPITAL ENCOUNTER (OUTPATIENT)
Dept: RADIOLOGY | Facility: HOSPITAL | Age: 64
Discharge: HOME | End: 2024-01-22
Payer: MEDICARE

## 2024-01-22 ENCOUNTER — PATIENT MESSAGE (OUTPATIENT)
Dept: PSYCHIATRY | Facility: CLINIC | Age: 64
End: 2024-01-22
Payer: MEDICARE

## 2024-01-22 DIAGNOSIS — G35 MULTIPLE SCLEROSIS: Chronic | ICD-10-CM

## 2024-01-22 PROCEDURE — 72146 MRI CHEST SPINE W/O DYE: CPT | Mod: 26,,, | Performed by: RADIOLOGY

## 2024-01-22 PROCEDURE — 72146 MRI CHEST SPINE W/O DYE: CPT | Mod: TC

## 2024-01-22 PROCEDURE — 70551 MRI BRAIN STEM W/O DYE: CPT | Mod: TC

## 2024-01-22 PROCEDURE — 70551 MRI BRAIN STEM W/O DYE: CPT | Mod: 26,,, | Performed by: RADIOLOGY

## 2024-01-22 PROCEDURE — 72141 MRI NECK SPINE W/O DYE: CPT | Mod: 26,,, | Performed by: RADIOLOGY

## 2024-01-22 PROCEDURE — 72141 MRI NECK SPINE W/O DYE: CPT | Mod: TC

## 2024-02-26 DIAGNOSIS — R53.83 FATIGUE, UNSPECIFIED TYPE: ICD-10-CM

## 2024-02-26 DIAGNOSIS — G35 MULTIPLE SCLEROSIS: Chronic | ICD-10-CM

## 2024-02-27 NOTE — TELEPHONE ENCOUNTER
"----- Message from Catalina Odom sent at 2/27/2024 12:05 PM CST -----  Regarding: refill  Contact: [phone] ext [phone]  Name Of Caller: Gisela capellan/Ritchie     Contact Preference?: [phone] ext [phone]     What is the nature of the call?: states pt is upset due to he ran out of medication modafiniL (PROVIGIL) 100 MG Tab and requesting it be refilled asap. She states that the case has been escalated. She is asking for if a verbal can be done today. She states she is there until 5pm eastern time but anyone can accept the verbal     Additional Notes:      [facility] - [place], [place] - [address]  [address]  [facility] [address]  Phone: [phone] Fax: [phone]        "Thank you for all that you do for our patients"        "

## 2024-03-04 ENCOUNTER — TELEPHONE (OUTPATIENT)
Dept: NEUROLOGY | Facility: CLINIC | Age: 64
End: 2024-03-04
Payer: MEDICARE

## 2024-03-04 NOTE — TELEPHONE ENCOUNTER
----- Message from Larry Spence sent at 3/4/2024 11:33 AM CST -----  Regarding: refill request  Rx REFILL REQUEST    Refill or New Rx: refill    RX Name and Strength: modafiniL (PROVIGIL) 100 MG Tab    Preferred Pharmacy with phone number:  [facility] - [facility] [address] [address]   [address] [facility] [address]   Phone: [phone] Fax: [phone]   Hours: Not open 24 hours     Additional Information: Darshan Sosa called from [facility] pharmacy, trying to verify receipt of a refill request. I verified the correct fax no and she will be re-sending the request soon.

## 2024-03-14 DIAGNOSIS — G35 MULTIPLE SCLEROSIS: Chronic | ICD-10-CM

## 2024-03-14 DIAGNOSIS — R53.83 FATIGUE, UNSPECIFIED TYPE: ICD-10-CM

## 2024-03-14 NOTE — TELEPHONE ENCOUNTER
----- Message from Catalina Odom sent at 3/14/2024  1:16 PM CDT -----  Regarding: refill  Contact: [phone]  Rx Refill/Request    Is this a Refill or New Rx:   refill    Rx Name and Strength: modafiniL (PROVIGIL) 100 MG Tab    Preferred Pharmacy with phone number      [facility] - [place], [place] - [address]   [address]  [facility] [address]  Phone: [phone] Fax: [phone]        Additional Information: He states that he is out of this Rx and would like it sent asap

## 2024-03-18 RX ORDER — MODAFINIL 100 MG/1
100 TABLET ORAL DAILY
Qty: 90 TABLET | Refills: 1 | Status: SHIPPED | OUTPATIENT
Start: 2024-03-18

## 2024-03-18 RX ORDER — MODAFINIL 100 MG/1
100 TABLET ORAL
Qty: 90 TABLET | Refills: 1 | OUTPATIENT
Start: 2024-03-18

## 2024-04-09 DIAGNOSIS — Z86.69 HX OF MIGRAINE HEADACHES: ICD-10-CM

## 2024-04-10 RX ORDER — AMITRIPTYLINE HYDROCHLORIDE 25 MG/1
25 TABLET, FILM COATED ORAL NIGHTLY
Qty: 90 TABLET | Refills: 1 | Status: SHIPPED | OUTPATIENT
Start: 2024-04-10

## 2024-05-02 ENCOUNTER — PATIENT MESSAGE (OUTPATIENT)
Dept: PSYCHIATRY | Facility: CLINIC | Age: 64
End: 2024-05-02
Payer: MEDICARE

## 2024-06-12 ENCOUNTER — TELEPHONE (OUTPATIENT)
Dept: NEUROLOGY | Facility: CLINIC | Age: 64
End: 2024-06-12
Payer: MEDICARE

## 2024-06-12 NOTE — TELEPHONE ENCOUNTER
Labs 7/15/24. Ocrevus approved. Patient scheduled 7/29/24. Message patient to schedule for Ocrevus per his request

## 2024-07-15 ENCOUNTER — LAB VISIT (OUTPATIENT)
Dept: LAB | Facility: HOSPITAL | Age: 64
End: 2024-07-15
Payer: MEDICARE

## 2024-07-15 ENCOUNTER — OFFICE VISIT (OUTPATIENT)
Dept: NEUROLOGY | Facility: CLINIC | Age: 64
End: 2024-07-15
Payer: MEDICARE

## 2024-07-15 VITALS
HEART RATE: 61 BPM | HEIGHT: 64 IN | WEIGHT: 158.5 LBS | DIASTOLIC BLOOD PRESSURE: 68 MMHG | BODY MASS INDEX: 27.06 KG/M2 | SYSTOLIC BLOOD PRESSURE: 108 MMHG

## 2024-07-15 DIAGNOSIS — G35 MULTIPLE SCLEROSIS: Chronic | ICD-10-CM

## 2024-07-15 DIAGNOSIS — G35 MULTIPLE SCLEROSIS: Primary | Chronic | ICD-10-CM

## 2024-07-15 DIAGNOSIS — R27.8 COORDINATION IMPAIRMENTS: ICD-10-CM

## 2024-07-15 DIAGNOSIS — R29.898 RIGHT LEG WEAKNESS: ICD-10-CM

## 2024-07-15 LAB
ALBUMIN SERPL BCP-MCNC: 4.2 G/DL (ref 3.5–5.2)
ALP SERPL-CCNC: 71 U/L (ref 55–135)
ALT SERPL W/O P-5'-P-CCNC: 38 U/L (ref 10–44)
ANION GAP SERPL CALC-SCNC: 7 MMOL/L (ref 8–16)
AST SERPL-CCNC: 24 U/L (ref 10–40)
BASOPHILS # BLD AUTO: 0.05 K/UL (ref 0–0.2)
BASOPHILS NFR BLD: 1 % (ref 0–1.9)
BILIRUB SERPL-MCNC: 0.5 MG/DL (ref 0.1–1)
BUN SERPL-MCNC: 10 MG/DL (ref 8–23)
CALCIUM SERPL-MCNC: 9.9 MG/DL (ref 8.7–10.5)
CHLORIDE SERPL-SCNC: 104 MMOL/L (ref 95–110)
CO2 SERPL-SCNC: 29 MMOL/L (ref 23–29)
CREAT SERPL-MCNC: 0.9 MG/DL (ref 0.5–1.4)
DIFFERENTIAL METHOD BLD: ABNORMAL
EOSINOPHIL # BLD AUTO: 0.2 K/UL (ref 0–0.5)
EOSINOPHIL NFR BLD: 4.4 % (ref 0–8)
ERYTHROCYTE [DISTWIDTH] IN BLOOD BY AUTOMATED COUNT: 12.2 % (ref 11.5–14.5)
EST. GFR  (NO RACE VARIABLE): >60 ML/MIN/1.73 M^2
GLUCOSE SERPL-MCNC: 95 MG/DL (ref 70–110)
HBV CORE AB SERPL QL IA: NORMAL
HBV SURFACE AG SERPL QL IA: NORMAL
HCT VFR BLD AUTO: 47.6 % (ref 40–54)
HGB BLD-MCNC: 15.5 G/DL (ref 14–18)
IGA SERPL-MCNC: 307 MG/DL (ref 40–350)
IGG SERPL-MCNC: 1095 MG/DL (ref 650–1600)
IGM SERPL-MCNC: 26 MG/DL (ref 50–300)
IMM GRANULOCYTES # BLD AUTO: 0.04 K/UL (ref 0–0.04)
IMM GRANULOCYTES NFR BLD AUTO: 0.8 % (ref 0–0.5)
LYMPHOCYTES # BLD AUTO: 1.5 K/UL (ref 1–4.8)
LYMPHOCYTES NFR BLD: 29.3 % (ref 18–48)
MCH RBC QN AUTO: 29.8 PG (ref 27–31)
MCHC RBC AUTO-ENTMCNC: 32.6 G/DL (ref 32–36)
MCV RBC AUTO: 92 FL (ref 82–98)
MONOCYTES # BLD AUTO: 0.6 K/UL (ref 0.3–1)
MONOCYTES NFR BLD: 11.9 % (ref 4–15)
NEUTROPHILS # BLD AUTO: 2.8 K/UL (ref 1.8–7.7)
NEUTROPHILS NFR BLD: 52.6 % (ref 38–73)
NRBC BLD-RTO: 0 /100 WBC
PLATELET # BLD AUTO: 262 K/UL (ref 150–450)
PMV BLD AUTO: 8.9 FL (ref 9.2–12.9)
POTASSIUM SERPL-SCNC: 4.5 MMOL/L (ref 3.5–5.1)
PROT SERPL-MCNC: 7.6 G/DL (ref 6–8.4)
RBC # BLD AUTO: 5.2 M/UL (ref 4.6–6.2)
SODIUM SERPL-SCNC: 140 MMOL/L (ref 136–145)
WBC # BLD AUTO: 5.22 K/UL (ref 3.9–12.7)

## 2024-07-15 PROCEDURE — 99999 PR PBB SHADOW E&M-EST. PATIENT-LVL III: CPT | Mod: PBBFAC,HCWC,, | Performed by: STUDENT IN AN ORGANIZED HEALTH CARE EDUCATION/TRAINING PROGRAM

## 2024-07-15 PROCEDURE — 36415 COLL VENOUS BLD VENIPUNCTURE: CPT | Mod: HCWC

## 2024-07-15 PROCEDURE — 80053 COMPREHEN METABOLIC PANEL: CPT | Mod: HCWC

## 2024-07-15 PROCEDURE — 3008F BODY MASS INDEX DOCD: CPT | Mod: HCWC,CPTII,S$GLB, | Performed by: STUDENT IN AN ORGANIZED HEALTH CARE EDUCATION/TRAINING PROGRAM

## 2024-07-15 PROCEDURE — 85025 COMPLETE CBC W/AUTO DIFF WBC: CPT | Mod: HCWC

## 2024-07-15 PROCEDURE — 1159F MED LIST DOCD IN RCRD: CPT | Mod: HCWC,CPTII,S$GLB, | Performed by: STUDENT IN AN ORGANIZED HEALTH CARE EDUCATION/TRAINING PROGRAM

## 2024-07-15 PROCEDURE — G2211 COMPLEX E/M VISIT ADD ON: HCPCS | Mod: HCWC,S$GLB,, | Performed by: STUDENT IN AN ORGANIZED HEALTH CARE EDUCATION/TRAINING PROGRAM

## 2024-07-15 PROCEDURE — 87340 HEPATITIS B SURFACE AG IA: CPT | Mod: HCWC

## 2024-07-15 PROCEDURE — 86704 HEP B CORE ANTIBODY TOTAL: CPT | Mod: HCWC

## 2024-07-15 PROCEDURE — 99215 OFFICE O/P EST HI 40 MIN: CPT | Mod: HCWC,S$GLB,, | Performed by: STUDENT IN AN ORGANIZED HEALTH CARE EDUCATION/TRAINING PROGRAM

## 2024-07-15 PROCEDURE — 82784 ASSAY IGA/IGD/IGG/IGM EACH: CPT | Mod: 59,HCWC

## 2024-07-15 PROCEDURE — 3074F SYST BP LT 130 MM HG: CPT | Mod: HCWC,CPTII,S$GLB, | Performed by: STUDENT IN AN ORGANIZED HEALTH CARE EDUCATION/TRAINING PROGRAM

## 2024-07-15 PROCEDURE — 3078F DIAST BP <80 MM HG: CPT | Mod: HCWC,CPTII,S$GLB, | Performed by: STUDENT IN AN ORGANIZED HEALTH CARE EDUCATION/TRAINING PROGRAM

## 2024-07-15 NOTE — PATIENT INSTRUCTIONS
Modafinil increase to 200mg every morning     Ok to stop dalfampridine    Trial of AFO     Imaging Jan 2025      [email or web address]

## 2024-07-15 NOTE — PROGRESS NOTES
[facility]  Follow Up Patient Visit      Disease Summary     Principle neurological diagnosis: MS  Date of symptom onset: 1998  Date of diagnosis: 1998  Disease type at diagnosis: Progressive  Disease type currently: Progressive, without relapses, but active progression  Previous therapy: Avonex (s/e's) 1999 - 2006  Current therapy: Ocrevus 5/2023 - present  Vitamin D Dose: 5000IU daily   Last MRI Brain: 1/22/24 - stable   Last MRI C-spine: 1/22/24 - stable   Last MRI T-spine: 1/22/24 - stable   CSF: NA  JCV:   Lab Results   Component Value Date    JCVINDEX 1.34 (H) 10/12/2022    JCVANTIBODY POSITIVE (A) 10/12/2022     Vit D:   Lab Results   Component Value Date    [id number] 52 12/07/2021     Interval history:     He's stable since last visit.   MRI 1/2024 stable.  Falls are 2x/month - usually due to legs tripping over carpet    He restarted dalfampridine and stopped a month ago, has not noticed any difference.  Has not noticed any difference with modafinil, takes about once a week.     Tolerating Ocrevus well, without infection complication.     Heat affects him greatly.    ROS:     SOCIAL HISTORY  Living arrangements - the patient lives with their family.  Social History     Socioeconomic History    Marital status:     Number of children: 2   Occupational History    Occupation:     Occupation: [employer]    Occupation: currently unempl   Tobacco Use    Smoking status: Never    Smokeless tobacco: Never   Substance and Sexual Activity    Alcohol use: Yes     Alcohol/week: 3.0 standard drinks of alcohol     Types: 2 Glasses of wine, 1 Cans of beer per week     Comment: occassionally    Drug use: No       Patient Employment       Status   Full Time    Employer   [employer] (OTHER)    Address   ,                    11/19/2023     9:48 PM   REVIEW OF SYMPTOMS   Do you feel abnormally tired on most days? No   Do you feel you generally sleep well? Yes   Do you have  difficulty controlling your bladder?  No   Do you have difficulty controlling your bowels?  No   Do you have frequent muscle cramps, tightness or spasms in your limbs?  No   Do you have new visual symptoms?  No   Do you have worsening difficulty with your memory or thinking? No   Do you have worsening symptoms of anxiety or depression?  No   For patients who walk, Do you have more difficulty walking?  No   Have you fallen since your last visit?  Yes   For patients who use wheelchairs: Do you have any skin wounds or breakdown? No   Do you have difficulty using your hands?  Yes   Do you have shooting or burning pain? No   Do you have difficulty with sexual function?  No   If you are sexually active, are you using birth control? Y/N  N/A No   Do you often choke when swallowing liquids or solid food?  No   Do you experience worsening symptoms when overheated? Yes   Do you need any new equipment such as a wheelchair, walker or shower chair? No   Do you receive co-pay financial assistance for your principal MS medicine? No   Would you be interested in participating in an MS research trial in the future? Yes   For patients on Gilenya, Tecfidera, Aubagio, Rituxan, Ocrevus, Tysabri, Lemtrada or Methotrexate, are you aware that you should NOT receive live virus vaccines?  No   Do you feel you have adequate family/friend support?  Yes   Do you have health insurance?   No   Are you currently employed? No   Do you receive SSDI/SSI?  Yes   Do you use marijuana or cannabis products? No   Have you been diagnosed with a urinary tract infection since your last visit here? No   Have you been diagnosed with a respiratory tract infection since your last visit here? No   Have you been to the emergency room since your last visit here? No   Have you been hospitalized since your last visit here?  No         11/19/2023    10:03 PM   FSS SCORE & INTERPRETATION   FSS SCORE  21   FSS SCORE INTERPRETATION May not be suffering from fatigue          11/19/2023     9:59 PM   MS BARI-D SCORE & INTERPRETATION   BARI-D SCORE  3   BARI-D INTERPRETATION  No indication of Depression         11/19/2023     9:52 PM   MS EDA-7 SCORE & INTERPRETATION   EDA-7 SCORE  0   EDA-7 SCORE INTERPRETATION Normal         11/19/2023    10:07 PM   PEQ MS MOS PAIN EFFECTS SCORE & INTERPRETATION   PES SCORE 7   PES SCORE INTERPRETATION Scores can range from 6-30.  Items are scaled so that higher scores indicate a greater impact of pain on a patients mood and behavior.         11/19/2023    10:10 PM   PEQ MS SEXUAL SATISFACTION SCORE & INTERPRETATION   SSS SCORE  6   SSS SCORE INTERPRETATION Scores can range from 4-24.  Higher scores indicate greater problems with sexual satisfaction.         11/19/2023    10:12 PM   MS BLADDER CONTROL SCORE & INTERPRETATION   BLCS SCORE 0   BLCS SCORE INTERPRETATION  Scores can range from 0-22, with higher scores indicating greater bladder control problems.         11/19/2023    10:20 PM   MS BOWEL CONTROL SCORE & INTERPRETATION   BWCS SCORE 0   BWCS SCORE INTERPRETATION Scores can range from 0-26, with higher scores indicating greater bowel control problems.         11/19/2023    10:14 PM   PEQ MS IMPACT OF VISUAL IMPAIRMENT SCORE & INTERPRETATION   SANTOS SCALE SCORE  0   SANTOS SCORE INTERPRETATION Scores can range from 0-15, with higher scores indicating greater impact of visual problems on daily activites.         11/19/2023    10:18 PM   MS PDQ SCORE & INTERPRETATION   PDQ RETROSPECTIVE MEMORY SUBSCALE 3   PDQ ATTENTION/CONCENTRATION SUBSCALE 2   PDQ PROSPECTIVE MEMORY SUBSCALE 2   PDQ PLANNING/ORGANIZATION SUBSCALE 2   PDQ TOTAL SCORE 9   PDQ SCORE INTERPRETATION Scores can range from 0-80, with higher scores indicating greater perceived cognitive impairment.         11/19/2023    10:24 PM   MSSS SCORE & INTERPRETATION   MSSS TANGIBLE SUPPORT SUBSCALE 0   MSSS EMOTIONAL/INFORMATIONAL SUPPORT SUBSCALE 0   MSSS AFFECTIONATE SUPPORT SUBSCALE 0   MSSS  "POSITIVE SOCIAL INTERACTION SUBSCALE 0   MSSS TOTAL SCORE 0   MSSS SCORE INTERPRETATION Scores can range from 0-100, with higher scores indicating greater perceived support.       Exam:     Vitals:    07/15/24 1138   BP: 108/68   Pulse: 61   Weight: 71.9 kg (158 lb 8.2 oz)   Height: 5' 4" (1.626 m)          In general, the patient is well nourished and appears to be in no acute distress.    MENTAL STATUS: language is fluent, normal verbal comprehension, short-term and remote memory is intact, attention is normal, patient is alert and oriented x 3, fund of knowlege is appropriate by vocabulary. Behavior and judgment appropriate.     CRANIAL NERVE EXAM:  There is no intrernuclear ophthalmoplegia.  Extraocular muscles are intact. No facial asymmetry. Facial sensation is intact bilaterally. There is no dysarthria. Uvula is midline, and palate moves symmetrically. Shoulder shrug intact bilaterlly. Tongue protrusion is midline. Hearing is intact to finger rub bilaterally. Neck is supple with full ROM    MOTOR EXAM: Normal bulk and tone throughout UE and LE bilaterally.   No pronator drift; slowed rapid sequential tapping on R    Strength:  R deltoid 5/5, L deltoid 5/5  R biceps 5/5, L biceps 5/5  R triceps 5/5, L triceps 5/5  R finger flexors 5/5, L finger flexors 5/5  R finger extensors 5/5, L finger extensors 5/5  R finger abductors 5/5, L finger abductors 5/5  R  hip flexors 3+/5, L hip flexors 5/5  R knee extensors 5/5, L knee extensors 5/5  R knee flexors 5/5, L knee flexors 5/5  R ankle dorsiflexors 4/5, L ankle dorsiflexors 5/5  R ankle plantarflexors 5/5, L ankle plantarflexors 5/5    SENSORY EXAM: Normal to light touch throughout.    COORDINATION: Dysmetria on b/l FTN (worse in R)    GAIT: Hemiparetic gait, with R foot drop and R circumduction        7/14/2023    12:01 AM 11/20/2023    12:01 AM 7/15/2024    12:01 AM   Timed 25 Foot Walk:   Did patient wear an AFO? No No No   Was assistive device used? No No No "   Time for 25 Foot Walk (seconds) 7.1 8.36 7.13   Time for 25 Foot Walk (seconds) 7.09 7.39 7.2           Imaging (personally reviewed):     Results for orders placed during the hospital encounter of 01/22/24    MRI Brain Demyelinating Without Contrast    Impression  No significant change from prior with continued several scattered small to punctate foci of T2 FLAIR signal hyperintensity throughout the brain parenchyma while nonspecific remain concerning for moderate degree of prior demyelinating plaque burden.    No definite new lesion to suggest significant interval or active demyelination.    Clinical correlation and follow-up advised.      Electronically signed by: Bill Munoz DO  Date:    01/22/2024  Time:    09:58    Results for orders placed during the hospital encounter of 01/22/24    MRI Cervical Spine Demyelinating Without Contrast    Impression  Continued short segment regions of T2 stir signal abnormality in the upper cervical and distal thoracic cord as detailed above most compatible with prior areas of demyelination in light of history. No new cord signal abnormality to suggest significant interval or active cord demyelination.    Continued spondylo degenerative change of the cervical spine as detailed above.      Electronically signed by: Bill Munoz DO  Date:    01/22/2024  Time:    10:00    Results for orders placed during the hospital encounter of 01/22/24    MRI Thoracic Spine Demyelinating Without Contrast    Impression  Continued short segment regions of T2 stir signal abnormality in the upper cervical and distal thoracic cord as detailed above most compatible with prior areas of demyelination in light of history. No new cord signal abnormality to suggest significant interval or active cord demyelination.    Continued spondylo degenerative change of the cervical spine as detailed above.      Electronically signed by: Bill Munoz DO  Date:    01/22/2024  Time:    10:00    Results for orders  "placed during the hospital encounter of 11/01/22    MRI Brain Demyelinating W W/O Contrast    Impression  Stable nonspecific white matter lesions consistent with the history of multiple sclerosis with no new or enhancing lesion identified.      Electronically signed by: Demetrius Phan  Date:    11/02/2022  Time:    08:25    Results for orders placed during the hospital encounter of 11/01/22    MRI Cervical Spine Demyelinating W W/O Contrast    Impression  Stable lesions within the cervical cord with no new or enhancing lesion.    In the thoracic cord there are lesions distally.  There is no prior study for comparison.  Minimal enhancement of one of the lesions is difficult to exclude but thought less likely with no evidence of definite cord expansion.      Electronically signed by: Demetrius Phan  Date:    11/02/2022  Time:    10:00    Results for orders placed during the hospital encounter of 11/01/22    MRI Thoracic Spine Demyelinating W W/O Contrast    Impression  FINDINGS/  Please see the report of this MR imaging study of the cervical spine as these studies were dictated together.      Electronically signed by: Demetrius Phan  Date:    11/02/2022  Time:    12:09      Labs:     Lab Results   Component Value Date    [id number] 52 12/07/2021     Lab Results   Component Value Date    JCVINDEX 1.34 (H) 10/12/2022    JCVANTIBODY POSITIVE (A) 10/12/2022     No results found for: "JS1FDPOQ", "ABSOLUTECD3", "QL0CEVSK", "ABSOLUTECD8", "DX2YWPDW", "ABSOLUTECD4", "LABCD48"  Lab Results   Component Value Date    WBC 5.22 07/15/2024    RBC 5.20 07/15/2024    HGB 15.5 07/15/2024    HCT 47.6 07/15/2024    MCV 92 07/15/2024    MCH 29.8 07/15/2024    MCHC 32.6 07/15/2024    RDW 12.2 07/15/2024     07/15/2024    MPV 8.9 (L) 07/15/2024    GRAN 2.8 07/15/2024    GRAN 52.6 07/15/2024    LYMPH 1.5 07/15/2024    LYMPH 29.3 07/15/2024    MONO 0.6 07/15/2024    MONO 11.9 07/15/2024    EOS 0.2 07/15/2024    BASO 0.05 07/15/2024 "    EOSINOPHIL 4.4 07/15/2024    BASOPHIL 1.0 07/15/2024     Sodium   Date Value Ref Range Status   10/25/2023 139 136 - 145 mmol/L Final     Potassium   Date Value Ref Range Status   10/25/2023 3.9 3.5 - 5.1 mmol/L Final     Chloride   Date Value Ref Range Status   10/25/2023 101 95 - 110 mmol/L Final     CO2   Date Value Ref Range Status   10/25/2023 24 23 - 29 mmol/L Final     Glucose   Date Value Ref Range Status   10/25/2023 123 (H) 70 - 110 mg/dL Final     BUN   Date Value Ref Range Status   10/25/2023 11 8 - 23 mg/dL Final     Creatinine   Date Value Ref Range Status   10/25/2023 0.9 0.5 - 1.4 mg/dL Final     Calcium   Date Value Ref Range Status   10/25/2023 9.6 8.7 - 10.5 mg/dL Final     Total Protein   Date Value Ref Range Status   10/25/2023 7.7 6.0 - 8.4 g/dL Final     Albumin   Date Value Ref Range Status   10/25/2023 4.3 3.5 - 5.2 g/dL Final     Total Bilirubin   Date Value Ref Range Status   10/25/2023 0.5 0.1 - 1.0 mg/dL Final     Comment:     For infants and newborns, interpretation of results should be based  on gestational age, weight and in agreement with clinical  observations.    Premature Infant recommended reference ranges:  Up to 24 hours.............<8.0 mg/dL  Up to 48 hours............<12.0 mg/dL  3-5 days..................<15.0 mg/dL  6-29 days.................<15.0 mg/dL       Alkaline Phosphatase   Date Value Ref Range Status   10/25/2023 77 55 - 135 U/L Final     AST   Date Value Ref Range Status   10/25/2023 22 10 - 40 U/L Final     ALT   Date Value Ref Range Status   10/25/2023 30 10 - 44 U/L Final     Anion Gap   Date Value Ref Range Status   10/25/2023 14 8 - 16 mmol/L Final     eGFR if    Date Value Ref Range Status   12/07/2021 >60 >60 mL/min/1.73 m^2 Final     eGFR if non    Date Value Ref Range Status   12/07/2021 >60 >60 mL/min/1.73 m^2 Final     Comment:     Calculation used to obtain the estimated glomerular filtration  rate (eGFR) is the  CKD-EPI equation.          Diagnosis/Assessment/Plan:     Multiple Sclerosis  -Assessment:PPMS on Ocrevus, imaging stable, exam stable.   -Imaging: MRI brain, c and t spine in 1 year (Jan 2025)   -Disease Modifying Therapies:Continue Ocrevus, safety labs reviewed.  Symptom management              -Check vit D level with next visit, ok to continue 5000 IU              -Did not notice difference with dalfampridine, ok to hold off for now  -Modafinil and CoQ10 for fatigue, consider increasing modafinil to 200mg Qam to try    Regarding needed medical equipment: I am recommending a right AFO for right foot drop.  Type of AFO: carbon-fiber.  The patient's prognosis using the AFO is excellent.   Plan discussed and questions were answered to satisfaction.  Return to clinic in 6 months.        NEURO MULTIPLE SCLEROSIS IMPRESSION:   Number of relapses in the past year?:  0  Clinical Progression:  Clinically Stable  MRI Progression:  Stable  MS Classification:  Primary Progressive MS  DMT:  No change in management  Symptom Management:  Implement change in symptom management  Implement Change in Symptom Management:  Adaptive Needs and Fatigue  Supplements:  Vit D and Co Q 10        Total time spent with the patient: 45 minutes, including face to face consultation, chart review and coordination of care, on the day of the visit. This includes face to face time and non-face to face time preparing to see the patient (eg, review of tests), obtaining and/or reviewing separately obtained history, documenting clinical information in the electronic or other health record, independently interpreting results and communicating results to the patient/family/caregiver, or care coordination.   I performed a neurobehavioral status examination that included a clinical assessment of thinking, reasoning, and judgment. Please see above HPI and ROS for full details.       Danica Anne MD, MSc  Attending neurologist

## 2024-07-16 ENCOUNTER — PATIENT MESSAGE (OUTPATIENT)
Dept: NEUROLOGY | Facility: CLINIC | Age: 64
End: 2024-07-16
Payer: MEDICARE

## 2024-07-23 ENCOUNTER — TELEPHONE (OUTPATIENT)
Dept: PSYCHIATRY | Facility: CLINIC | Age: 64
End: 2024-07-23
Payer: MEDICARE

## 2024-07-23 DIAGNOSIS — R26.9 GAIT ABNORMALITY: ICD-10-CM

## 2024-07-23 DIAGNOSIS — R29.898 RIGHT LEG WEAKNESS: ICD-10-CM

## 2024-07-23 DIAGNOSIS — G35 MULTIPLE SCLEROSIS: Primary | Chronic | ICD-10-CM

## 2024-07-25 ENCOUNTER — PATIENT MESSAGE (OUTPATIENT)
Dept: PSYCHIATRY | Facility: CLINIC | Age: 64
End: 2024-07-25
Payer: MEDICARE

## 2024-07-29 ENCOUNTER — INFUSION (OUTPATIENT)
Dept: INFUSION THERAPY | Facility: HOSPITAL | Age: 64
End: 2024-07-29
Payer: MEDICARE

## 2024-07-29 VITALS
BODY MASS INDEX: 27.4 KG/M2 | TEMPERATURE: 98 F | HEART RATE: 83 BPM | WEIGHT: 160.5 LBS | HEIGHT: 64 IN | OXYGEN SATURATION: 97 % | DIASTOLIC BLOOD PRESSURE: 63 MMHG | SYSTOLIC BLOOD PRESSURE: 110 MMHG | RESPIRATION RATE: 18 BRPM

## 2024-07-29 DIAGNOSIS — G35 MULTIPLE SCLEROSIS: Primary | ICD-10-CM

## 2024-07-29 PROCEDURE — 25000003 PHARM REV CODE 250: Mod: HCWC | Performed by: STUDENT IN AN ORGANIZED HEALTH CARE EDUCATION/TRAINING PROGRAM

## 2024-07-29 PROCEDURE — 96366 THER/PROPH/DIAG IV INF ADDON: CPT | Mod: HCWC

## 2024-07-29 PROCEDURE — 96367 TX/PROPH/DG ADDL SEQ IV INF: CPT | Mod: HCWC

## 2024-07-29 PROCEDURE — 63600175 PHARM REV CODE 636 W HCPCS: Mod: HCWC | Performed by: STUDENT IN AN ORGANIZED HEALTH CARE EDUCATION/TRAINING PROGRAM

## 2024-07-29 PROCEDURE — 96375 TX/PRO/DX INJ NEW DRUG ADDON: CPT | Mod: HCWC

## 2024-07-29 PROCEDURE — 96365 THER/PROPH/DIAG IV INF INIT: CPT | Mod: HCWC

## 2024-07-29 RX ORDER — HEPARIN 100 UNIT/ML
500 SYRINGE INTRAVENOUS
OUTPATIENT
Start: 2024-11-18

## 2024-07-29 RX ORDER — FAMOTIDINE 10 MG/ML
20 INJECTION INTRAVENOUS
OUTPATIENT
Start: 2024-11-18

## 2024-07-29 RX ORDER — SODIUM CHLORIDE 0.9 % (FLUSH) 0.9 %
10 SYRINGE (ML) INJECTION
Status: DISCONTINUED | OUTPATIENT
Start: 2024-07-29 | End: 2024-07-29 | Stop reason: HOSPADM

## 2024-07-29 RX ORDER — EPINEPHRINE 0.3 MG/.3ML
0.3 INJECTION SUBCUTANEOUS
OUTPATIENT
Start: 2024-11-18

## 2024-07-29 RX ORDER — FAMOTIDINE 10 MG/ML
20 INJECTION INTRAVENOUS
Status: COMPLETED | OUTPATIENT
Start: 2024-07-29 | End: 2024-07-29

## 2024-07-29 RX ORDER — DIPHENHYDRAMINE HYDROCHLORIDE 50 MG/ML
50 INJECTION INTRAMUSCULAR; INTRAVENOUS
OUTPATIENT
Start: 2024-11-18

## 2024-07-29 RX ORDER — ACETAMINOPHEN 500 MG
1000 TABLET ORAL
Status: COMPLETED | OUTPATIENT
Start: 2024-07-29 | End: 2024-07-29

## 2024-07-29 RX ORDER — DIPHENHYDRAMINE HYDROCHLORIDE 50 MG/ML
50 INJECTION INTRAMUSCULAR; INTRAVENOUS
Status: DISCONTINUED | OUTPATIENT
Start: 2024-07-29 | End: 2024-07-29 | Stop reason: HOSPADM

## 2024-07-29 RX ORDER — SODIUM CHLORIDE 0.9 % (FLUSH) 0.9 %
10 SYRINGE (ML) INJECTION
OUTPATIENT
Start: 2024-11-18

## 2024-07-29 RX ORDER — ACETAMINOPHEN 500 MG
1000 TABLET ORAL
OUTPATIENT
Start: 2024-11-18

## 2024-07-29 RX ORDER — EPINEPHRINE 0.3 MG/.3ML
0.3 INJECTION SUBCUTANEOUS
Status: DISCONTINUED | OUTPATIENT
Start: 2024-07-29 | End: 2024-07-29 | Stop reason: HOSPADM

## 2024-07-29 RX ADMIN — DEXTROSE 100 MG: 50 INJECTION, SOLUTION INTRAVENOUS at 08:07

## 2024-07-29 RX ADMIN — ACETAMINOPHEN 1000 MG: 500 TABLET ORAL at 08:07

## 2024-07-29 RX ADMIN — OCRELIZUMAB 600 MG: 300 INJECTION INTRAVENOUS at 09:07

## 2024-07-29 RX ADMIN — SODIUM CHLORIDE: 9 INJECTION, SOLUTION INTRAVENOUS at 08:07

## 2024-07-29 RX ADMIN — FAMOTIDINE 20 MG: 10 INJECTION INTRAVENOUS at 08:07

## 2024-07-29 RX ADMIN — DIPHENHYDRAMINE HYDROCHLORIDE 50 MG: 50 INJECTION, SOLUTION INTRAMUSCULAR; INTRAVENOUS at 08:07

## 2024-07-29 NOTE — PLAN OF CARE
"  Problem: Fatigue  Goal: Improved Activity Tolerance  Outcome: Progressing  Intervention: Promote Improved Energy  Flowsheets (Taken 7/29/2024 9229)  Fatigue Management:   activity schedule adjusted   activity assistance provided   fatigue-related activity identified   frequent rest breaks encouraged   paced activity encouraged  Sleep/Rest Enhancement:   awakenings minimized   consistent schedule promoted   family presence promoted   natural light exposure provided   noise level reduced   reading promoted   regular sleep/rest pattern promoted   relaxation techniques promoted  Activity Management:   Ambulated -L4   Up in chair - L3  Environmental Support:   calm environment promoted   caregiver consistency promoted   comfort object encouraged   distractions minimized   environmental consistency promoted   personal routine supported   rest periods encouraged  /63 (Patient Position: Sitting)   Pulse 83   Temp 97.7 °F (36.5 °C)   Resp 18   Ht 5' 4" (1.626 m)   Wt 72.8 kg (160 lb 7.9 oz)   SpO2 97%   BMI 27.55 kg/m² Pleasant, alert and oriented patient to Chemo Infusion per family via w/c for maintenance Ocrevus - VSS and PIV started x2 attempts with immediate flashback observed, flushed with NS, site secured and patient tolerated procedure well - patient tolerated treatment with no AVE's, PIV discontinued, pressure dressing applied and patient discharged to home with no concerns - RTC in 6 months       "

## 2024-07-30 LAB — HEMOCCULT STL QL IA: NEGATIVE

## 2024-08-05 ENCOUNTER — PATIENT MESSAGE (OUTPATIENT)
Dept: PSYCHIATRY | Facility: CLINIC | Age: 64
End: 2024-08-05
Payer: MEDICARE

## 2024-08-09 ENCOUNTER — DOCUMENTATION ONLY (OUTPATIENT)
Dept: NEUROLOGY | Facility: CLINIC | Age: 64
End: 2024-08-09
Payer: MEDICARE

## 2024-09-04 DIAGNOSIS — Z86.69 HX OF MIGRAINE HEADACHES: ICD-10-CM

## 2024-09-06 RX ORDER — AMITRIPTYLINE HYDROCHLORIDE 25 MG/1
25 TABLET, FILM COATED ORAL NIGHTLY
Qty: 90 TABLET | Refills: 3 | Status: SHIPPED | OUTPATIENT
Start: 2024-09-06 | End: 2024-09-06

## 2024-09-06 RX ORDER — AMITRIPTYLINE HYDROCHLORIDE 25 MG/1
25 TABLET, FILM COATED ORAL NIGHTLY
Qty: 90 TABLET | Refills: 1 | Status: SHIPPED | OUTPATIENT
Start: 2024-09-06

## 2024-12-16 ENCOUNTER — PATIENT MESSAGE (OUTPATIENT)
Dept: PSYCHIATRY | Facility: CLINIC | Age: 64
End: 2024-12-16
Payer: MEDICARE

## 2024-12-20 ENCOUNTER — TELEPHONE (OUTPATIENT)
Dept: NEUROLOGY | Facility: CLINIC | Age: 64
End: 2024-12-20
Payer: MEDICARE

## 2024-12-23 ENCOUNTER — PATIENT MESSAGE (OUTPATIENT)
Dept: NEUROLOGY | Facility: CLINIC | Age: 64
End: 2024-12-23
Payer: MEDICARE

## 2025-01-03 ENCOUNTER — TELEPHONE (OUTPATIENT)
Dept: NEUROLOGY | Facility: CLINIC | Age: 65
End: 2025-01-03
Payer: MEDICARE

## 2025-01-17 ENCOUNTER — OFFICE VISIT (OUTPATIENT)
Dept: NEUROLOGY | Facility: CLINIC | Age: 65
End: 2025-01-17
Payer: MEDICARE

## 2025-01-17 VITALS
SYSTOLIC BLOOD PRESSURE: 116 MMHG | HEIGHT: 64 IN | DIASTOLIC BLOOD PRESSURE: 75 MMHG | BODY MASS INDEX: 27.7 KG/M2 | HEART RATE: 99 BPM | WEIGHT: 162.25 LBS

## 2025-01-17 DIAGNOSIS — G35 MULTIPLE SCLEROSIS: Primary | Chronic | ICD-10-CM

## 2025-01-17 DIAGNOSIS — E55.9 VITAMIN D DEFICIENCY: ICD-10-CM

## 2025-01-17 PROCEDURE — 99999 PR PBB SHADOW E&M-EST. PATIENT-LVL III: CPT | Mod: PBBFAC,,,

## 2025-01-17 PROCEDURE — G2211 COMPLEX E/M VISIT ADD ON: HCPCS | Mod: S$GLB,,,

## 2025-01-17 PROCEDURE — 99214 OFFICE O/P EST MOD 30 MIN: CPT | Mod: S$GLB,,,

## 2025-01-17 PROCEDURE — 1159F MED LIST DOCD IN RCRD: CPT | Mod: CPTII,S$GLB,,

## 2025-01-17 PROCEDURE — 3008F BODY MASS INDEX DOCD: CPT | Mod: CPTII,S$GLB,,

## 2025-01-17 PROCEDURE — 3074F SYST BP LT 130 MM HG: CPT | Mod: CPTII,S$GLB,,

## 2025-01-17 PROCEDURE — 3078F DIAST BP <80 MM HG: CPT | Mod: CPTII,S$GLB,,

## 2025-01-17 PROCEDURE — 1160F RVW MEDS BY RX/DR IN RCRD: CPT | Mod: CPTII,S$GLB,,

## 2025-01-17 NOTE — PROGRESS NOTES
"Patient ID: Hernandez Hernandez is a 64 y.o. male who presents today for a routine clinic visit for MS.  He was last seen by Dr. Anne on 7/15/2024.  The history was provided by the patient.     Principle neurological diagnosis: MS  Date of symptom onset: 1998  Date of diagnosis: 1998  Disease type at diagnosis: Progressive  Disease type currently: Progressive, without relapses, but active progression  Previous therapy: Avonex (s/e's) 1999 - 2006  Current therapy: Ocrevus 5/2023 - present  Vitamin D Dose: 5000IU daily   Last MRI Brain: 1/22/2024 - stable   Last MRI C-spine: 1/22/2024 - stable   Last MRI T-spine: 1/22/2024 - stable    CSF: NA  JCV status: 10/12/2022: positive (1.34)  Other relevant labs and tests: Vitamin D 12/7/2021: 52      Subjective:     He did get an AFO.  He states that when he uses it, he feels that it works well.  He does forget to wear it as he forgets to put it in his shoes.  He states that he has not had any "serious" falls since last visit.      He does not really take the modafinil, but states that his fatigue is better right now due to it being cooler outside.     Overall, he is feeling stable neurologically.       ROS:      1/10/2025    11:51 AM   REVIEW OF SYMPTOMS   Do you feel abnormally tired on most days? No    Do you feel you generally sleep well? Yes    Do you have difficulty controlling your bladder?  No    Do you have difficulty controlling your bowels?  No    Do you have frequent muscle cramps, tightness or spasms in your limbs?  No    Do you have new visual symptoms?  No    Do you have worsening difficulty with your memory or thinking? No    Do you have worsening symptoms of anxiety or depression?  No    For patients who walk, Do you have more difficulty walking?  Yes    Have you fallen since your last visit?  Yes    For patients who use wheelchairs: Do you have any skin wounds or breakdown? Not Applicable    Do you have difficulty using your hands?  Yes  Right hand    Do you have " shooting or burning pain? No    Do you have difficulty with sexual function?  Yes    If you are sexually active, are you using birth control? Y/N  N/A Yes    Do you often choke when swallowing liquids or solid food?  No    Do you experience worsening symptoms when overheated? Yes    Do you need any new equipment such as a wheelchair, walker or shower chair? No    Do you receive co-pay financial assistance for your principal MS medicine? No    Would you be interested in participating in an MS research trial in the future? Yes    For patients on Gilenya, Tecfidera, Aubagio, Rituxan, Ocrevus, Tysabri, Lemtrada or Methotrexate, are you aware that you should NOT receive live virus vaccines?  Not Applicable    Do you feel you have adequate family/friend support?  Yes    Do you have health insurance?   No    Are you currently employed? No    Do you receive SSDI/SSI?  Yes    Do you use marijuana or cannabis products? No    Have you been diagnosed with a urinary tract infection since your last visit here? No    Have you been diagnosed with a respiratory tract infection since your last visit here? No    Have you been to the emergency room since your last visit here? No    Have you been hospitalized since your last visit here?  No        Patient-reported            1/10/2025    12:04 PM   FSS SCORE & INTERPRETATION   FSS SCORE  47    FSS SCORE INTERPRETATION May be suffering from fatigue        Patient-reported         1/10/2025    11:59 AM   MS BARI-D SCORE & INTERPRETATION   BARI-D SCORE  3    BARI-D INTERPRETATION  No indication of Depression        Patient-reported         1/10/2025    11:55 AM   MS EDA-7 SCORE & INTERPRETATION   EDA-7 SCORE  0    EDA-7 SCORE INTERPRETATION Normal        Patient-reported         1/10/2025    12:06 PM   PEQ MS MOS PAIN EFFECTS SCORE & INTERPRETATION   PES SCORE 8    PES SCORE INTERPRETATION Scores can range from 6-30.  Items are scaled so that higher scores indicate a greater impact of pain on  a patients mood and behavior.        Patient-reported         1/10/2025    12:08 PM   PEQ MS SEXUAL SATISFACTION SCORE & INTERPRETATION   SSS SCORE  4    SSS SCORE INTERPRETATION Scores can range from 4-24.  Higher scores indicate greater problems with sexual satisfaction.        Patient-reported         1/10/2025    12:10 PM   MS BLADDER CONTROL SCORE & INTERPRETATION   BLCS SCORE 0    BLCS SCORE INTERPRETATION  Scores can range from 0-22, with higher scores indicating greater bladder control problems.        Patient-reported         1/10/2025    12:17 PM   MS BOWEL CONTROL SCORE & INTERPRETATION   BWCS SCORE 1    BWCS SCORE INTERPRETATION Scores can range from 0-26, with higher scores indicating greater bowel control problems.        Patient-reported         1/10/2025    12:11 PM   PEQ MS IMPACT OF VISUAL IMPAIRMENT SCORE & INTERPRETATION   SANTOS SCALE SCORE  0    SANTOS SCORE INTERPRETATION Scores can range from 0-15, with higher scores indicating greater impact of visual problems on daily activites.        Patient-reported         1/10/2025    12:15 PM   MS PDQ SCORE & INTERPRETATION   PDQ RETROSPECTIVE MEMORY SUBSCALE 2    PDQ ATTENTION/CONCENTRATION SUBSCALE 2    PDQ PROSPECTIVE MEMORY SUBSCALE 2    PDQ PLANNING/ORGANIZATION SUBSCALE 0    PDQ TOTAL SCORE 6    PDQ SCORE INTERPRETATION Scores can range from 0-80, with higher scores indicating greater perceived cognitive impairment.        Patient-reported         11/19/2023    10:24 PM   MSSS SCORE & INTERPRETATION   MSSS TANGIBLE SUPPORT SUBSCALE 0   MSSS EMOTIONAL/INFORMATIONAL SUPPORT SUBSCALE 0   MSSS AFFECTIONATE SUPPORT SUBSCALE 0   MSSS POSITIVE SOCIAL INTERACTION SUBSCALE 0   MSSS TOTAL SCORE 0   MSSS SCORE INTERPRETATION Scores can range from 0-100, with higher scores indicating greater perceived support.        SOCIAL HISTORY  Living arrangements - the patient lives alone.  Social History     Socioeconomic History    Marital status:     Number of  children: 2   Occupational History    Occupation:     Occupation: [employer]    Occupation: currently unempl   Tobacco Use    Smoking status: Never    Smokeless tobacco: Never   Substance and Sexual Activity    Alcohol use: Yes     Alcohol/week: 3.0 standard drinks of alcohol     Types: 2 Glasses of wine, 1 Cans of beer per week     Comment: occassionally    Drug use: No     Social Drivers of Health     Financial Resource Strain: Low Risk  (7/15/2024)    Overall Financial Resource Strain (CARDIA)     Difficulty of Paying Living Expenses: Not hard at all   Food Insecurity: No Food Insecurity (7/15/2024)    Hunger Vital Sign     Worried About Running Out of Food in the Last Year: Never true     Ran Out of Food in the Last Year: Never true   Physical Activity: Unknown (7/15/2024)    Exercise Vital Sign     Days of Exercise per Week: 0 days   Stress: No Stress Concern Present (7/15/2024)    [employer] - Occupational Stress Questionnaire     Feeling of Stress : Not at all   Housing Stability: Unknown (7/15/2024)    Housing Stability Vital Sign     Unable to Pay for Housing in the Last Year: No       Current Outpatient Medications on File Prior to Visit   Medication Sig Dispense Refill    amitriptyline (ELAVIL) 25 MG tablet Take 1 tablet (25 mg total) by mouth every evening. 90 tablet 1    b complex vitamins tablet Take 1 tablet by mouth once daily.      cholecalciferol, vitamin D3, 125 mcg (5,000 unit) Tab Take 5,000 Units by mouth once daily.      magnesium 250 mg Tab Take 1 tablet by mouth once.      melatonin 1 mg Tab Take 2 tablets by mouth once daily.      modafiniL (PROVIGIL) 100 MG Tab Take 1 tablet (100 mg total) by mouth once daily. 90 tablet 1     No current facility-administered medications on file prior to visit.       Objective:     1. 25 foot timed walk:      7/15/2024    12:00 PM 1/17/2025     1:00 PM   Timed 25 Foot Walk:   Did patient wear an AFO? No No   Was assistive  device used? No No   Time for 25 Foot Walk (seconds) 7.13 7.62   Time for 25 Foot Walk (seconds) 7.2 7.89           NEURO EXAM    In general, the patient is well nourished and appears to be in no acute distress.    MENTAL STATUS: language is fluent, normal verbal comprehension, short-term and remote memory is intact, attention is normal, patient is alert and oriented x 3, fund of knowlege is appropriate by vocabulary.     CRANIAL NERVE EXAM:  Right internuclear ophthalmoplegia. No facial asymmetry. Facial sensation is intact bilaterally. There is no dysarthria. Uvula is midline, and palate moves symmetrically. Shoulder shrug intact bilaterally. Tongue protrusion is midline. Hearing is intact to finger rub bilaterally. Neck is supple with full ROM    MOTOR EXAM: Normal bulk and tone throughout UE and LE bilaterally.  Rapid sequential movements are dysmetric on right and slowed on left with finger tap;    Strength:  R deltoid 5/5, L deltoid 5/5  R biceps 5/5, L biceps 5/5  R triceps 5/5, L triceps 5/5  R wrist flexors 5/5, L wrist flexors 5/5  R wrist extensors 5/5, L wrist extensors 5/5  R finger abductors 5/5, L finger abductors 5/5  R hip flexors 3/5, L hip flexors 4+/5  R knee extensors 5/5, L knee extensors 5/5  R knee flexors 4+/5, L knee flexors 5/5  R ankle dorsiflexors 4/5, L ankle dorsiflexors 5/5  R ankle plantarflexors 5/5, L ankle plantarflexors 5/5    REFLEXES: 2+ and symmetric throughout in all four extremities except 3+ at patella     SENSORY EXAM: Normal to light touch throughout, vibration absent BLE to knee and at right fingers normal on left finger.    COORDINATION: severe dysmetria finger-to-nose exam on right and slight dysmetria on left. Unable to perform heel-to-shin exam on right slightly limited on the left     GAIT: wide based, ataxic, right foot drop and hip circumduction.     Imaging:     Personally reviewed.     Results for orders placed during the hospital encounter of 01/22/24    MRI  Brain Demyelinating Without Contrast    Impression  No significant change from prior with continued several scattered small to punctate foci of T2 FLAIR signal hyperintensity throughout the brain parenchyma while nonspecific remain concerning for moderate degree of prior demyelinating plaque burden.    No definite new lesion to suggest significant interval or active demyelination.    Clinical correlation and follow-up advised.      Electronically signed by: Bill Munoz DO  Date:    01/22/2024  Time:    09:58    Results for orders placed during the hospital encounter of 01/22/24    MRI Cervical Spine Demyelinating Without Contrast    Impression  Continued short segment regions of T2 stir signal abnormality in the upper cervical and distal thoracic cord as detailed above most compatible with prior areas of demyelination in light of history. No new cord signal abnormality to suggest significant interval or active cord demyelination.    Continued spondylo degenerative change of the cervical spine as detailed above.      Electronically signed by: Bill Munoz DO  Date:    01/22/2024  Time:    10:00    Results for orders placed during the hospital encounter of 01/22/24    MRI Thoracic Spine Demyelinating Without Contrast    Impression  Continued short segment regions of T2 stir signal abnormality in the upper cervical and distal thoracic cord as detailed above most compatible with prior areas of demyelination in light of history. No new cord signal abnormality to suggest significant interval or active cord demyelination.    Continued spondylo degenerative change of the cervical spine as detailed above.      Electronically signed by: Bill Munoz DO  Date:    01/22/2024  Time:    10:00    Results for orders placed during the hospital encounter of 11/01/22    MRI Brain Demyelinating W W/O Contrast    Impression  Stable nonspecific white matter lesions consistent with the history of multiple sclerosis with no new or  "enhancing lesion identified.      Electronically signed by: Demetrius Phan  Date:    11/02/2022  Time:    08:25    Results for orders placed during the hospital encounter of 11/01/22    MRI Cervical Spine Demyelinating W W/O Contrast    Impression  Stable lesions within the cervical cord with no new or enhancing lesion.    In the thoracic cord there are lesions distally.  There is no prior study for comparison.  Minimal enhancement of one of the lesions is difficult to exclude but thought less likely with no evidence of definite cord expansion.      Electronically signed by: Demetrius Phan  Date:    11/02/2022  Time:    10:00    Results for orders placed during the hospital encounter of 11/01/22    MRI Thoracic Spine Demyelinating W W/O Contrast    Impression  FINDINGS/  Please see the report of this MR imaging study of the cervical spine as these studies were dictated together.      Electronically signed by: Demetrius Phan  Date:    11/02/2022  Time:    12:09        Labs:     Lab Results   Component Value Date    [id number] 52 12/07/2021     Lab Results   Component Value Date    JCVINDEX 1.34 (H) 10/12/2022    JCVANTIBODY POSITIVE (A) 10/12/2022     No results found for: "UW2CPZEC", "ABSOLUTECD3", "PU4HEUQO", "ABSOLUTECD8", "SL6JINXZ", "ABSOLUTECD4", "LABCD48"  Lab Results   Component Value Date    WBC 5.22 07/15/2024    RBC 5.20 07/15/2024    HGB 15.5 07/15/2024    HCT 47.6 07/15/2024    MCV 92 07/15/2024    MCH 29.8 07/15/2024    MCHC 32.6 07/15/2024    RDW 12.2 07/15/2024     07/15/2024    MPV 8.9 (L) 07/15/2024    GRAN 2.8 07/15/2024    GRAN 52.6 07/15/2024    LYMPH 1.5 07/15/2024    LYMPH 29.3 07/15/2024    MONO 0.6 07/15/2024    MONO 11.9 07/15/2024    EOS 0.2 07/15/2024    BASO 0.05 07/15/2024    EOSINOPHIL 4.4 07/15/2024    BASOPHIL 1.0 07/15/2024     Sodium   Date Value Ref Range Status   07/15/2024 140 136 - 145 mmol/L Final     Potassium   Date Value Ref Range Status   07/15/2024 4.5 3.5 - 5.1 " mmol/L Final     Chloride   Date Value Ref Range Status   07/15/2024 104 95 - 110 mmol/L Final     CO2   Date Value Ref Range Status   07/15/2024 29 23 - 29 mmol/L Final     Glucose   Date Value Ref Range Status   07/15/2024 95 70 - 110 mg/dL Final     BUN   Date Value Ref Range Status   07/15/2024 10 8 - 23 mg/dL Final     Creatinine   Date Value Ref Range Status   07/15/2024 0.9 0.5 - 1.4 mg/dL Final     Calcium   Date Value Ref Range Status   07/15/2024 9.9 8.7 - 10.5 mg/dL Final     Total Protein   Date Value Ref Range Status   07/15/2024 7.6 6.0 - 8.4 g/dL Final     Albumin   Date Value Ref Range Status   07/15/2024 4.2 3.5 - 5.2 g/dL Final     Total Bilirubin   Date Value Ref Range Status   07/15/2024 0.5 0.1 - 1.0 mg/dL Final     Comment:     For infants and newborns, interpretation of results should be based  on gestational age, weight and in agreement with clinical  observations.    Premature Infant recommended reference ranges:  Up to 24 hours.............<8.0 mg/dL  Up to 48 hours............<12.0 mg/dL  3-5 days..................<15.0 mg/dL  6-29 days.................<15.0 mg/dL       Alkaline Phosphatase   Date Value Ref Range Status   07/15/2024 71 55 - 135 U/L Final     AST   Date Value Ref Range Status   07/15/2024 24 10 - 40 U/L Final     ALT   Date Value Ref Range Status   07/15/2024 38 10 - 44 U/L Final     Anion Gap   Date Value Ref Range Status   07/15/2024 7 (L) 8 - 16 mmol/L Final     eGFR if    Date Value Ref Range Status   12/07/2021 >60 >60 mL/min/1.73 m^2 Final     eGFR if non    Date Value Ref Range Status   12/07/2021 >60 >60 mL/min/1.73 m^2 Final     Comment:     Calculation used to obtain the estimated glomerular filtration  rate (eGFR) is the CKD-EPI equation.        Lab Results   Component Value Date    HEPBSAG Non-reactive 07/15/2024    HEPBSAB <3.00 10/12/2022    HEPBSAB Non-reactive 10/12/2022    HEPBCAB Non-reactive 07/15/2024           MS  Impression and Plan:     NEURO MULTIPLE SCLEROSIS IMPRESSION:   Number of relapses in the past year?:  0  Clinical Progression:  Clinically Stable  MRI Progression:  Stable  MS Classification:  Primary Progressive MS  Current DMT: ocrelizumab  DMT:  No change in management  DMT comment:  He is aware of the risks associated with immunosuppressant therapy, including increased risk of infection.   Symptom Management:  No change in symptom management  Additional Impressions:   Patient is remaining clinically and radiologically stable on Ocrevus.   Safety labs today  Yearly MRIs soon  Follow up in 6 months with Dr. Anne   Plan discussed and questions were answered to satisfaction.     Problem List Items Addressed This Visit       Multiple sclerosis - Primary (Chronic)    Relevant Orders    CBC Auto Differential    Comprehensive Metabolic Panel    Hepatitis B Core Antibody, Total    Hepatitis B Surface Antigen    Immunoglobulins (IgG, IgA, IgM) Quantitative    Misc Sendout Test, Blood Vitamin D Level    MRI Brain Demyelinating Without Contrast    MRI Cervical Spine Demyelinating Without Contrast    MRI Thoracic Spine Demyelinating Without Contrast     Other Visit Diagnoses       Vitamin D deficiency        Relevant Orders    Misc Sendout Test, Blood Vitamin D Level            I spent a total of 35 minutes on the day of the visit.This includes face to face time and non-face to face time preparing to see the patient (eg, review of tests), obtaining and/or reviewing separately obtained history, documenting clinical information in the electronic or other health record, independently interpreting results and communicating results to the patient/family/caregiver, or care coordinator.       CORY Bowling

## 2025-02-03 ENCOUNTER — OFFICE VISIT (OUTPATIENT)
Dept: URGENT CARE | Facility: CLINIC | Age: 65
End: 2025-02-03
Payer: MEDICARE

## 2025-02-03 VITALS
WEIGHT: 162 LBS | BODY MASS INDEX: 27.66 KG/M2 | SYSTOLIC BLOOD PRESSURE: 120 MMHG | HEART RATE: 81 BPM | OXYGEN SATURATION: 97 % | RESPIRATION RATE: 16 BRPM | TEMPERATURE: 98 F | DIASTOLIC BLOOD PRESSURE: 73 MMHG | HEIGHT: 64 IN

## 2025-02-03 DIAGNOSIS — R22.31 LOCALIZED SWELLING OF RIGHT FOREARM: ICD-10-CM

## 2025-02-03 DIAGNOSIS — S22.41XA CLOSED FRACTURE OF MULTIPLE RIBS OF RIGHT SIDE, INITIAL ENCOUNTER: ICD-10-CM

## 2025-02-03 DIAGNOSIS — R07.81 RIB PAIN ON RIGHT SIDE: ICD-10-CM

## 2025-02-03 DIAGNOSIS — S62.354A CLOSED NONDISPLACED FRACTURE OF SHAFT OF FOURTH METACARPAL BONE OF RIGHT HAND, INITIAL ENCOUNTER: ICD-10-CM

## 2025-02-03 DIAGNOSIS — M79.641 RIGHT HAND PAIN: Primary | ICD-10-CM

## 2025-02-03 DIAGNOSIS — S62.352A CLOSED NONDISPLACED FRACTURE OF SHAFT OF THIRD METACARPAL BONE OF RIGHT HAND, INITIAL ENCOUNTER: ICD-10-CM

## 2025-02-03 PROCEDURE — 73090 X-RAY EXAM OF FOREARM: CPT | Mod: FY,RT,S$GLB, | Performed by: RADIOLOGY

## 2025-02-03 PROCEDURE — 73130 X-RAY EXAM OF HAND: CPT | Mod: FY,RT,S$GLB, | Performed by: RADIOLOGY

## 2025-02-03 PROCEDURE — 99214 OFFICE O/P EST MOD 30 MIN: CPT | Mod: S$GLB,,,

## 2025-02-03 PROCEDURE — 71101 X-RAY EXAM UNILAT RIBS/CHEST: CPT | Mod: FY,RT,S$GLB, | Performed by: RADIOLOGY

## 2025-02-03 RX ORDER — LIDOCAINE 50 MG/G
1 PATCH TOPICAL DAILY
Qty: 7 PATCH | Refills: 0 | Status: SHIPPED | OUTPATIENT
Start: 2025-02-03 | End: 2025-02-10

## 2025-02-03 NOTE — PROGRESS NOTES
"Subjective:      Patient ID: Hernandez Hernandez is a 64 y.o. male.    Vitals:  height is 5' 4" (1.626 m) and weight is 73.5 kg (162 lb). His oral temperature is 98.4 °F (36.9 °C). His blood pressure is 120/73 and his pulse is 81. His respiration is 16 and oxygen saturation is 97%.     Chief Complaint: Fall    This is a 64 y.o. male who presents today with a chief complaint of  fall.  Patient had a fall on Friday. Patient slipped in the tub injuring his right hand and right side.  Hand and forearm are swollen. Patient had to put ring and middle finger back in place. The palm of the patient's hand is bruised.       Fall  The accident occurred 3 to 5 days ago. Fall occurred: tub. He fell from a height of 1 to 2 ft. Impact surface: Hard surface. There was no blood loss. Point of impact: ribs. The pain is present in the right hand. The pain is at a severity of 4/10. The pain is moderate. He has tried ice (advil) for the symptoms. The treatment provided mild relief.       Skin:  Negative for erythema.      Objective:     Physical Exam   Constitutional: He is oriented to person, place, and time.  Non-toxic appearance. He does not appear ill. No distress.      Comments:Patient sits comfortably in exam chair. Answers questions in complete sentences. Does not show any signs of distress or discoloration.        HENT:   Head: Normocephalic and atraumatic.   Ears:   Right Ear: External ear normal.   Left Ear: External ear normal.   Nose: Nose normal.   Eyes: Pupils are equal, round, and reactive to light. Extraocular movement intact   Pulmonary/Chest: Effort normal. No stridor. No respiratory distress. He has no decreased breath sounds. He has no wheezes. He has no rhonchi. He has no rales. He exhibits no tenderness.   Abdominal: Normal appearance.   Musculoskeletal:         General: Swelling, tenderness and signs of injury present. No deformity.        Back:         Hands:       Right lower leg: No edema.      Left lower leg: No " edema.      Comments: TTP over the right lower ribs. No erythema, swelling or bruising noted. Skin intact.    Neurological: no focal deficit. He is alert and oriented to person, place, and time.   Skin: Skin is warm, dry, not diaphoretic, not pale and no rash. Capillary refill takes less than 2 seconds. No bruising, No erythema and No lesion jaundice  Psychiatric: His behavior is normal. Mood, judgment and thought content normal.     XR RIB RIGHT W/ PA CHEST    Result Date: 2/3/2025  EXAMINATION: RIBS INCLUDING PA CHEST CLINICAL HISTORY: Rib pain. TECHNIQUE: PA chest and three views of the right chest COMPARISON: None. FINDINGS: Chest radiograph images cardiac silhouette within normal limits. There is no pneumothorax, pleural effusion, or focal consolidation detected.  Mild dextroscoliosis is present. Three views of the right ribs demonstrate no displaced fractures.  There is a nondisplaced fracture of the right lateral 9th rib.     1.  Nondisplaced right lateral 9th rib fracture. 2. No pneumothorax. Electronically signed by: Melly Mondragon Date:    02/03/2025 Time:    19:25    XR HAND COMPLETE 3 VIEW RIGHT    Result Date: 2/3/2025  EXAMINATION: TWO VIEWS OF THE RIGHT FOREARM AND THREE VIEWS OF THE RIGHT HAND CLINICAL HISTORY: Localized swelling, mass and lump, right upper limb; Pain in right hand TECHNIQUE: AP and lateral view of the right forearm.  AP, lateral and oblique views of the right hand. COMPARISON: <None.> FINDINGS: Two views of the right forearm demonstrate no acute fracture or dislocation. Dorsal soft tissue swelling is present.  Three views of the right hand demonstrate acute traumatic fractures involving the shafts of the 3rd and 4th metatarsals.     As above described. Electronically signed by: Melly Mondragon Date:    02/03/2025 Time:    19:24    XR FOREARM RIGHT    Result Date: 2/3/2025  EXAMINATION: TWO VIEWS OF THE RIGHT FOREARM AND THREE VIEWS OF THE RIGHT HAND CLINICAL HISTORY: Localized  swelling, mass and lump, right upper limb; Pain in right hand TECHNIQUE: AP and lateral view of the right forearm.  AP, lateral and oblique views of the right hand. COMPARISON: <None.> FINDINGS: Two views of the right forearm demonstrate no acute fracture or dislocation. Dorsal soft tissue swelling is present.  Three views of the right hand demonstrate acute traumatic fractures involving the shafts of the 3rd and 4th metatarsals.     As above described. Electronically signed by: Melly Mondragon Date:    02/03/2025 Time:    19:24     Assessment:     1. Right hand pain    2. Localized swelling of right forearm    3. Rib pain on right side    4. Closed nondisplaced fracture of shaft of third metacarpal bone of right hand, initial encounter    5. Closed nondisplaced fracture of shaft of fourth metacarpal bone of right hand, initial encounter    6. Closed fracture of multiple ribs of right side, initial encounter        Plan:       Right hand pain  -     XR HAND COMPLETE 3 VIEW RIGHT; Future; Expected date: 02/03/2025    Localized swelling of right forearm  -     XR FOREARM RIGHT; Future; Expected date: 02/03/2025    Rib pain on right side  -     XR RIB RIGHT W/ PA CHEST; Future; Expected date: 02/03/2025    Closed nondisplaced fracture of shaft of third metacarpal bone of right hand, initial encounter  -     Ambulatory referral/consult to Hand Surgery    Closed nondisplaced fracture of shaft of fourth metacarpal bone of right hand, initial encounter  -     Ambulatory referral/consult to Hand Surgery    Closed fracture of multiple ribs of right side, initial encounter  -     LIDOcaine (LIDODERM) 5 %; Place 1 patch onto the skin once daily. Remove & Discard patch within 12 hours or as directed by MD for 7 days  Dispense: 7 patch; Refill: 0                Patient Instructions   Take tylenol and ibuprofen as needed for pain. You can alternate between the 2 every 4 hours. For example: take 600 mg of ibuprofen/advil and then  4 hours later you can take 1000 mg of tylenol/acetaminophen. Repeat this every 4 hours as needed for pain.     A referral for Hand has been placed. Call [phone] to schedule an appointment. Make sure to follow up in 1-2 weeks or sooner if symptoms continue or worsen.     - Rest.    - Drink plenty of fluids.    - Acetaminophen (tylenol) or Ibuprofen (advil,motrin) as directed as needed for fever/pain. Avoid tylenol if you have a history of liver disease. Do not take ibuprofen if you have a history of GI bleeding, kidney disease, or if you take blood thinners.   - Ibuprofen dosing for adults: 400 mg by mouth every 4-6 hours as needed. Max: 2400 mg/day; Info: use lowest effective dose, shortest effective treatment duration; give w/ food if GI upset occurs.  - Tylenol dosing for adults: [By mouth route, immediate-release form] Dose: 325-1000 mg by mouth every 4-6h as needed; Max: 1 g/4h and 4 g/day from all sources. [By mouth route, extended-release form] Dose: 650-1300 mg Extended Release by mouth every 8h as needed; Max: 4 g/day from all sources.     - You must understand that you have received an Urgent Care treatment only and that you may be released before all of your medical problems are known or treated.   - You, the patient, will arrange for follow up care as instructed.   - If your condition worsens or fails to improve we recommend that you receive another evaluation at the ER immediately or contact your PCP to discuss your concerns or return here.   - Follow up with your PCP or specialty clinic as directed in the next 1-2 weeks if not improved or as needed.  You can call [phone] to schedule an appointment with the appropriate provider.    If your symptoms do not improve or worsen, go to the emergency room immediately.

## 2025-02-04 NOTE — PATIENT INSTRUCTIONS
Take tylenol and ibuprofen as needed for pain. You can alternate between the 2 every 4 hours. For example: take 600 mg of ibuprofen/advil and then 4 hours later you can take 1000 mg of tylenol/acetaminophen. Repeat this every 4 hours as needed for pain.     A referral for Hand has been placed. Call [phone] to schedule an appointment. Make sure to follow up in 1-2 weeks or sooner if symptoms continue or worsen.     - Rest.    - Drink plenty of fluids.    - Acetaminophen (tylenol) or Ibuprofen (advil,motrin) as directed as needed for fever/pain. Avoid tylenol if you have a history of liver disease. Do not take ibuprofen if you have a history of GI bleeding, kidney disease, or if you take blood thinners.   - Ibuprofen dosing for adults: 400 mg by mouth every 4-6 hours as needed. Max: 2400 mg/day; Info: use lowest effective dose, shortest effective treatment duration; give w/ food if GI upset occurs.  - Tylenol dosing for adults: [By mouth route, immediate-release form] Dose: 325-1000 mg by mouth every 4-6h as needed; Max: 1 g/4h and 4 g/day from all sources. [By mouth route, extended-release form] Dose: 650-1300 mg Extended Release by mouth every 8h as needed; Max: 4 g/day from all sources.     - You must understand that you have received an Urgent Care treatment only and that you may be released before all of your medical problems are known or treated.   - You, the patient, will arrange for follow up care as instructed.   - If your condition worsens or fails to improve we recommend that you receive another evaluation at the ER immediately or contact your PCP to discuss your concerns or return here.   - Follow up with your PCP or specialty clinic as directed in the next 1-2 weeks if not improved or as needed.  You can call [phone] to schedule an appointment with the appropriate provider.    If your symptoms do not improve or worsen, go to the emergency room immediately.

## 2025-02-05 ENCOUNTER — TELEPHONE (OUTPATIENT)
Dept: NEUROLOGY | Facility: CLINIC | Age: 65
End: 2025-02-05
Payer: MEDICARE

## 2025-02-05 ENCOUNTER — OFFICE VISIT (OUTPATIENT)
Dept: ORTHOPEDICS | Facility: CLINIC | Age: 65
End: 2025-02-05
Payer: MEDICARE

## 2025-02-05 ENCOUNTER — TELEPHONE (OUTPATIENT)
Dept: ORTHOPEDICS | Facility: CLINIC | Age: 65
End: 2025-02-05
Payer: MEDICARE

## 2025-02-05 ENCOUNTER — PATIENT MESSAGE (OUTPATIENT)
Dept: NEUROLOGY | Facility: CLINIC | Age: 65
End: 2025-02-05
Payer: MEDICARE

## 2025-02-05 ENCOUNTER — HOSPITAL ENCOUNTER (OUTPATIENT)
Dept: RADIOLOGY | Facility: HOSPITAL | Age: 65
Discharge: HOME | End: 2025-02-05
Payer: MEDICARE

## 2025-02-05 DIAGNOSIS — S62.352A CLOSED NONDISPLACED FRACTURE OF SHAFT OF THIRD METACARPAL BONE OF RIGHT HAND, INITIAL ENCOUNTER: ICD-10-CM

## 2025-02-05 DIAGNOSIS — S62.324A CLOSED DISPLACED FRACTURE OF SHAFT OF FOURTH METACARPAL BONE OF RIGHT HAND, INITIAL ENCOUNTER: Primary | ICD-10-CM

## 2025-02-05 DIAGNOSIS — M79.641 RIGHT HAND PAIN: ICD-10-CM

## 2025-02-05 PROCEDURE — 1160F RVW MEDS BY RX/DR IN RCRD: CPT | Mod: HCWC,CPTII,S$GLB,

## 2025-02-05 PROCEDURE — 73130 X-RAY EXAM OF HAND: CPT | Mod: 26,HCWC,RT, | Performed by: RADIOLOGY

## 2025-02-05 PROCEDURE — 73130 X-RAY EXAM OF HAND: CPT | Mod: TC,HCWC,RT

## 2025-02-05 PROCEDURE — 99999 PR PBB SHADOW E&M-EST. PATIENT-LVL III: CPT | Mod: PBBFAC,HCWC,,

## 2025-02-05 PROCEDURE — 1159F MED LIST DOCD IN RCRD: CPT | Mod: HCWC,CPTII,S$GLB,

## 2025-02-05 PROCEDURE — 99204 OFFICE O/P NEW MOD 45 MIN: CPT | Mod: HCWC,S$GLB,,

## 2025-02-05 NOTE — TELEPHONE ENCOUNTER
Called pt to schedule labs. Asked pt if he can go today since he has another appt scheduled today. Pt states he could not go to get labs done because it would not be at the same location as other appt. Asked pt if he can go tomorrow or Friday , pt declined. Advised pt to let us know when he can go to get labs done since he does not want to go today at lab facility I am able to schedule at and declined going anytime the rest of this week.

## 2025-02-05 NOTE — PROGRESS NOTES
[facility]  History & Physical  Orthopedics    SUBJECTIVE:      Chief Complaint:  Right hand pain    Referring Provider: Boo Carrillo Dr. is the supervising physician for this encounter/patient    History of Present Illness:  Patient is a 64 y.o. left-hand dominant male presents today with complaints of right hand pain since sustaining a slip and fall out of his tub on January 31, 2025.  He initially presented to [facility] urgent care where x-rays of the right hand revealed a closed nondisplaced fracture of the 3rd metacarpal shaft and a closed nondisplaced fracture of the 4th metacarpal shaft.  He was placed into a right ulnar gutter plaster splint including the 3rd digit. He presents today with a 0/10 pain.  He notes he has been treating his pain and swelling with Advil as needed. He denies numbness, burning, and tingling.  He further denies previous upper extremity surgical history, but does report he suffers from multiple sclerosis.  He presents today for initial evaluation with no further complaints.    The patient is a/an retired.    Onset of symptoms/DOI was January 31, 2025.    Symptoms are aggravated by activity and movement    Symptoms are alleviated by rest and inactivity    Symptoms consist of pain, swelling, ecchymosis, and decreased ROM.    The patient rates their pain as a 0/10.    Attempted treatment(s) and/or interventions include activity modifications, rest, plaster splint and oral anti-inflammatories.     The patient denies any fevers, chills, N/V, D/C and presents for evaluation.       Past Medical History:   Diagnosis Date    MS (multiple sclerosis)      Past Surgical History:   Procedure Laterality Date    hydrocele       Review of patient's allergies indicates:  No Known Allergies  Social History     Social History Narrative    Not on file     Family History   Problem Relation Name Age of Onset    Obesity Mother           Current Outpatient Medications:      amitriptyline (ELAVIL) 25 MG tablet, Take 1 tablet (25 mg total) by mouth every evening., Disp: 90 tablet, Rfl: 1    b complex vitamins tablet, Take 1 tablet by mouth once daily., Disp: , Rfl:     cholecalciferol, vitamin D3, 125 mcg (5,000 unit) Tab, Take 5,000 Units by mouth once daily., Disp: , Rfl:     LIDOcaine (LIDODERM) 5 %, Place 1 patch onto the skin once daily. Remove & Discard patch within 12 hours or as directed by MD for 7 days, Disp: 7 patch, Rfl: 0    magnesium 250 mg Tab, Take 1 tablet by mouth once., Disp: , Rfl:     melatonin 1 mg Tab, Take 2 tablets by mouth once daily., Disp: , Rfl:     modafiniL (PROVIGIL) 100 MG Tab, Take 1 tablet (100 mg total) by mouth once daily., Disp: 90 tablet, Rfl: 1    Review of Systems:  Constitutional: no fever or chills  Eyes: no visual changes  ENT: no nasal congestion or sore throat  Respiratory: no cough or shortness of breath  Cardiovascular: no chest pain  Gastrointestinal: no nausea or vomiting, tolerating diet  Musculoskeletal: pain, soreness, decreased ROM, and edema    OBJECTIVE:      Vital Signs (Most Recent):  There were no vitals filed for this visit.  There is no height or weight on file to calculate BMI.      Physical Exam:  Constitutional: The patient appears well-developed and well-nourished. No distress.   Skin: No lesions appreciated  Head: Normocephalic and atraumatic.   Nose: Nose normal.   Ears: No deformities seen  Eyes: Conjunctivae and EOM are normal.   Neck: No tracheal deviation present.   Cardiovascular: Normal rate and intact distal pulses.    Pulmonary/Chest: Effort normal. No respiratory distress.   Abdominal: There is no guarding.   Neurological: The patient is alert.   Psychiatric: The patient has a normal mood and affect.     Right Hand/Wrist Examination:    Observation/Inspection:  Swelling  significant swelling to the dorsum of the right hand    Deformity  none  Discoloration  notable ecchymosis to the dorsum of the right hand and  palmar surface of the right hand overlying the 3rd and 4th metacarpal   Scars   none    Atrophy  none    HAND/WRIST EXAMINATION:  Snuff box tenderness   Negative right     Neurovascular Exam:  Digits WWP, brisk CR < 3s throughout  NVI motor/LTS to M/R/U nerves, radial pulse 2+  Tinel's Test - Carpal Tunnel  Neg  Tinel's Test - Cubital Tunnel  Neg  Phalen's Test    Neg  Median Nerve Compression Test Neg    ROM hand restricted and limited; he is able to actively flex the PIP and DIP joints approximately 4 cm from the palm of the hand; no notable rotation or scissoring noted to the 3rd or 4th metacarpal with active flexion    ROM wrist restricted in limited due to post fall edema    ROM elbow full, painless    Abdomen not guarded  Respirations nonlabored  Perfusion intact    Diagnostic Results:     Imaging - I independently viewed the patient's imaging as well as the radiology report.  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  Osseous change to the IP joint of the right thumb.  There is a 3rd metacarpal shaft fracture in near anatomical alignment.  There is a 4th metacarpal shaft fracture with mild angulation and near anatomical alignment.  No additional acute fractures or dislocations noted.  There is significant soft tissue edema noted.    FINDINGS:  Fractures involving the 3rd and 4th metacarpal bones remain in unchanged and satisfactory position as compared to the previous study       EMG - None    ASSESSMENT/PLAN:      64 y.o. yo male with right 3rd metacarpal shaft fracture nondisplaced and right 4th metacarpal shaft fracture with acute angulation    Plan: The patient and I had a thorough discussion today.  We discussed the working diagnosis as well as several other potential alternative diagnoses.  Treatment options were discussed, both conservative and surgical.  Conservative treatment options would include things such as activity modifications, workplace modifications, a  period of rest, oral vs topical OTC and prescription anti-inflammatory medications, occupational therapy, splinting/bracing, immobilization, corticosteroid injections, and others.  Surgical options were discussed as well.     At this time, we discussed the nature of this patient's fractures.  He has a nondisplaced 3rd metacarpal shaft fracture and a 4th metacarpal shaft fracture with acute angulation.  We discussed surgical versus conservative options.  We discussed with conservative options the risk of nonunion, malunion, and displacement.  Considering the positioning of these fractures and lack of intra articulation and severe angulation, we will attempt conservative management with placing the patient in a right plaster ulnar gutter splint with intrinsic plus flexion today.  He is to keep the splint clean, dry, and intact until re-evaluation.  We will provide him a right arm sling to help reduce post fall edema.  He is to continue to elevate the right hand to help reduce swelling.  I also advised him to begin icing 20 minutes on and 20 minutes off with a barrier method.  He is to continue taking ibuprofen in the morning and at night to help reduce post fall edema.  He will follow up in approximately 1 week with updated right hand films out of splint or sooner for any problems.    Should the patient's symptoms worsen, persist, or fail to improve they should return for reevaluation and I would be happy to see them back anytime.    Please do not hesitate to reach out to us via email, phone, or MyChart with any questions, concerns, or feedback.       Dulce Burton PA-C

## 2025-02-10 NOTE — PROGRESS NOTES
[facility]  History & Physical  Orthopedics    SUBJECTIVE:      Chief Complaint:  Right hand pain    Referring Provider: No ref. provider found     Dr. Ho is the supervising physician for this encounter/patient    History of Present Illness:  Patient is a 64 y.o. left-hand dominant male presents today with complaints of right hand pain since sustaining a slip and fall out of his tub on January 31, 2025.  He initially presented to [facility] urgent care where x-rays of the right hand revealed a closed nondisplaced fracture of the 3rd metacarpal shaft and a closed nondisplaced fracture of the 4th metacarpal shaft.  He was placed into a right ulnar gutter plaster splint including the 3rd digit. He presents today with a 0/10 pain.  He notes he has been treating his pain and swelling with Advil as needed. He denies numbness, burning, and tingling.  He further denies previous upper extremity surgical history, but does report he suffers from multiple sclerosis.  He presents today for initial evaluation with no further complaints.    Interval history February 14, 2025: The patient returns for re-evaluation.  He has remained immobilized in a ulnar gutter plaster splint for the past week.  He presents today with minimal pain and he denies new falls/trauma.  He reports he is in a 0/10 pain, and he reports he is no longer taking anything for pain.  He further denies numbness and tingling.  He returns today for re-evaluation with no further complaints.    The patient is a/an retired.    Onset of symptoms/DOI was January 31, 2025.    Symptoms are aggravated by activity and movement    Symptoms are alleviated by rest and inactivity    Symptoms consist of pain, swelling, ecchymosis, and decreased ROM.    The patient rates their pain as a 0/10.    Attempted treatment(s) and/or interventions include activity modifications, rest, plaster splint and oral anti-inflammatories.     The patient denies any fevers,  chills, N/V, D/C and presents for evaluation.       Past Medical History:   Diagnosis Date    MS (multiple sclerosis)      Past Surgical History:   Procedure Laterality Date    hydrocele       Review of patient's allergies indicates:  No Known Allergies  Social History     Social History Narrative    Not on file     Family History   Problem Relation Name Age of Onset    Obesity Mother           Current Outpatient Medications:     amitriptyline (ELAVIL) 25 MG tablet, Take 1 tablet (25 mg total) by mouth every evening., Disp: 90 tablet, Rfl: 1    b complex vitamins tablet, Take 1 tablet by mouth once daily., Disp: , Rfl:     cholecalciferol, vitamin D3, 125 mcg (5,000 unit) Tab, Take 5,000 Units by mouth once daily., Disp: , Rfl:     LIDOcaine (LIDODERM) 5 %, Place 1 patch onto the skin once daily. Remove & Discard patch within 12 hours or as directed by MD for 7 days, Disp: 7 patch, Rfl: 0    magnesium 250 mg Tab, Take 1 tablet by mouth once., Disp: , Rfl:     melatonin 1 mg Tab, Take 2 tablets by mouth once daily., Disp: , Rfl:     modafiniL (PROVIGIL) 100 MG Tab, Take 1 tablet (100 mg total) by mouth once daily., Disp: 90 tablet, Rfl: 1    Review of Systems:  Constitutional: no fever or chills  Eyes: no visual changes  ENT: no nasal congestion or sore throat  Respiratory: no cough or shortness of breath  Cardiovascular: no chest pain  Gastrointestinal: no nausea or vomiting, tolerating diet  Musculoskeletal: pain, soreness, decreased ROM, and edema    OBJECTIVE:      Vital Signs (Most Recent):  There were no vitals filed for this visit.  There is no height or weight on file to calculate BMI.      Physical Exam:  Constitutional: The patient appears well-developed and well-nourished. No distress.   Skin: No lesions appreciated  Head: Normocephalic and atraumatic.   Nose: Nose normal.   Ears: No deformities seen  Eyes: Conjunctivae and EOM are normal.   Neck: No tracheal deviation present.   Cardiovascular: Normal  rate and intact distal pulses.    Pulmonary/Chest: Effort normal. No respiratory distress.   Abdominal: There is no guarding.   Neurological: The patient is alert.   Psychiatric: The patient has a normal mood and affect.     Right Hand/Wrist Examination:    Observation/Inspection:  Swelling  moderate swelling to the dorsum of the right hand    Deformity  none  Discoloration  notable ecchymosis to the dorsum of the right hand and palmar surface of the right hand overlying the 3rd and 4th metacarpal   Scars   none    Atrophy  none        HAND/WRIST EXAMINATION:  Snuff box tenderness   Negative right   He is nontender to palpate the 3rd and 4th metacarpal shaft, head, and base    Neurovascular Exam:  Digits WWP, brisk CR < 3s throughout  NVI motor/LTS to M/R/U nerves, radial pulse 2+  Tinel's Test - Carpal Tunnel  Neg  Tinel's Test - Cubital Tunnel  Neg  Phalen's Test    Neg  Median Nerve Compression Test Neg    ROM hand restricted and limited; he is able to actively flex the PIP and DIP joints approximately 4 cm from the palm of the hand; no notable rotation or scissoring noted to the 3rd or 4th metacarpal with active flexion    ROM wrist mildly restricted and limited due to post fall edema    ROM elbow full, painless    Abdomen not guarded  Respirations nonlabored  Perfusion intact    Diagnostic Results:     Imaging - I independently viewed the patient's imaging as well as the radiology report.  Xrays of the patient's right hand reveal an oblique 3rd metacarpal shaft fracture in near anatomical alignment.  There is also a 4th metacarpal shaft fracture with mild angulation and mild metacarpal head shortening in near anatomical alignment.  No notable callus noted to these fractures.  No additional acute fractures or dislocations noted.  There is moderate soft tissue edema noted.  Notable DJD.    FINDINGS:  Interval removal of previously noted gutter splint material.  Reconfirmed now subacute comminuted fracture  involving the shaft of the 5th metacarpal and oblique fracture involving the shaft of the 3rd metacarpal.  The appearance of the fracture sites and the position and alignment of the fracture fragments do not appear significantly changed to earlier exam.  As of yet no significant callus about fracture sites and fracture lines remain evident.  No new fractures.  Some stable minimal osteoarthritic spurring about the 1st through 3rd MCP articulations, thumb IP articulation, and less so about the a few of the finger IP articulations.  Suggested fusiform soft tissue swelling about the preserved the 3rd finger PIP articulation.  Some reconfirmed soft tissue swelling about the wrist, metacarpals, and MCP articulations however the extent of this is diminished to that seen on the exam of February 3, 2025.    EMG - None    ASSESSMENT/PLAN:      64 y.o. yo male with right 3rd metacarpal shaft fracture nondisplaced and right 4th metacarpal shaft fracture with acute angulation    Plan: The patient and I had a thorough discussion today.  We discussed the working diagnosis as well as several other potential alternative diagnoses.  Treatment options were discussed, both conservative and surgical.  Conservative treatment options would include things such as activity modifications, workplace modifications, a period of rest, oral vs topical OTC and prescription anti-inflammatory medications, occupational therapy, splinting/bracing, immobilization, corticosteroid injections, and others.  Surgical options were discussed as well.     At this time, we discussed the nature of this patient's fractures.  He has a nondisplaced 3rd metacarpal shaft fracture and a 4th metacarpal shaft fracture with acute angulation.  We discussed continuing conservative options.  We discussed with conservative options the risk of nonunion, malunion, and displacement.  We also discuss the changes to his normal knuckle cascade at the [facility].  Considering the  positioning of these fractures and lack of intra articulation and severe angulation, we will continue with closed management.  We will place the patient into a right ulnar gutter fiberglass cast today.  He is to keep this cast clean, dry, and intact.  I further advised him to continue to elevate the arm to help reduce post trauma edema.  He is to notify us if he his cast feels too tight or too loose.  He is to follow up in approximately 3 weeks with updated x-rays of the right hand out of cast or sooner for any problems.    Should the patient's symptoms worsen, persist, or fail to improve they should return for reevaluation and I would be happy to see them back anytime.    Please do not hesitate to reach out to us via email, phone, or MyChart with any questions, concerns, or feedback.       Dulce Burton PA-C

## 2025-02-13 ENCOUNTER — TELEPHONE (OUTPATIENT)
Dept: NEUROLOGY | Facility: CLINIC | Age: 65
End: 2025-02-13
Payer: MEDICARE

## 2025-02-13 NOTE — TELEPHONE ENCOUNTER
Spoke with pt to schedule labs, Pt is scheduled on 2/18 at [facility]. Pt confirmed date.

## 2025-02-14 ENCOUNTER — HOSPITAL ENCOUNTER (OUTPATIENT)
Dept: RADIOLOGY | Facility: HOSPITAL | Age: 65
Discharge: HOME | End: 2025-02-14
Payer: MEDICARE

## 2025-02-14 ENCOUNTER — OFFICE VISIT (OUTPATIENT)
Dept: ORTHOPEDICS | Facility: CLINIC | Age: 65
End: 2025-02-14
Payer: MEDICARE

## 2025-02-14 DIAGNOSIS — S62.324G: Primary | ICD-10-CM

## 2025-02-14 DIAGNOSIS — M79.641 RIGHT HAND PAIN: ICD-10-CM

## 2025-02-14 DIAGNOSIS — S62.352G: ICD-10-CM

## 2025-02-14 PROCEDURE — 73130 X-RAY EXAM OF HAND: CPT | Mod: TC,HCWC,RT

## 2025-02-14 PROCEDURE — 99999 PR PBB SHADOW E&M-EST. PATIENT-LVL III: CPT | Mod: PBBFAC,HCWC,,

## 2025-02-14 PROCEDURE — 73130 X-RAY EXAM OF HAND: CPT | Mod: 26,HCWC,RT, | Performed by: RADIOLOGY

## 2025-02-18 ENCOUNTER — LAB VISIT (OUTPATIENT)
Dept: LAB | Facility: HOSPITAL | Age: 65
End: 2025-02-18
Payer: MEDICARE

## 2025-02-18 DIAGNOSIS — E55.9 VITAMIN D DEFICIENCY: ICD-10-CM

## 2025-02-18 DIAGNOSIS — G35 MULTIPLE SCLEROSIS: Chronic | ICD-10-CM

## 2025-02-18 LAB
ALBUMIN SERPL BCP-MCNC: 3.9 G/DL (ref 3.5–5.2)
ALP SERPL-CCNC: 116 U/L (ref 40–150)
ALT SERPL W/O P-5'-P-CCNC: 28 U/L (ref 10–44)
ANION GAP SERPL CALC-SCNC: 13 MMOL/L (ref 8–16)
AST SERPL-CCNC: 40 U/L (ref 10–40)
BASOPHILS # BLD AUTO: 0.08 K/UL (ref 0–0.2)
BASOPHILS NFR BLD: 0.9 % (ref 0–1.9)
BILIRUB SERPL-MCNC: 0.4 MG/DL (ref 0.1–1)
BUN SERPL-MCNC: 11 MG/DL (ref 8–23)
CALCIUM SERPL-MCNC: 9.5 MG/DL (ref 8.7–10.5)
CHLORIDE SERPL-SCNC: 102 MMOL/L (ref 95–110)
CO2 SERPL-SCNC: 24 MMOL/L (ref 23–29)
CREAT SERPL-MCNC: 0.8 MG/DL (ref 0.5–1.4)
DIFFERENTIAL METHOD BLD: ABNORMAL
EOSINOPHIL # BLD AUTO: 0.3 K/UL (ref 0–0.5)
EOSINOPHIL NFR BLD: 3.3 % (ref 0–8)
ERYTHROCYTE [DISTWIDTH] IN BLOOD BY AUTOMATED COUNT: 12.6 % (ref 11.5–14.5)
EST. GFR  (NO RACE VARIABLE): >60 ML/MIN/1.73 M^2
GLUCOSE SERPL-MCNC: 106 MG/DL (ref 70–110)
HBV CORE AB SERPL QL IA: NORMAL
HBV SURFACE AG SERPL QL IA: NORMAL
HCT VFR BLD AUTO: 44.5 % (ref 40–54)
HGB BLD-MCNC: 14.2 G/DL (ref 14–18)
IGA SERPL-MCNC: 307 MG/DL (ref 40–350)
IGG SERPL-MCNC: 1060 MG/DL (ref 650–1600)
IGM SERPL-MCNC: 25 MG/DL (ref 50–300)
IMM GRANULOCYTES # BLD AUTO: 0.08 K/UL (ref 0–0.04)
IMM GRANULOCYTES NFR BLD AUTO: 0.9 % (ref 0–0.5)
LYMPHOCYTES # BLD AUTO: 1.5 K/UL (ref 1–4.8)
LYMPHOCYTES NFR BLD: 17.2 % (ref 18–48)
MCH RBC QN AUTO: 30.9 PG (ref 27–31)
MCHC RBC AUTO-ENTMCNC: 31.9 G/DL (ref 32–36)
MCV RBC AUTO: 97 FL (ref 82–98)
MONOCYTES # BLD AUTO: 0.9 K/UL (ref 0.3–1)
MONOCYTES NFR BLD: 10.8 % (ref 4–15)
NEUTROPHILS # BLD AUTO: 5.7 K/UL (ref 1.8–7.7)
NEUTROPHILS NFR BLD: 66.9 % (ref 38–73)
NRBC BLD-RTO: 0 /100 WBC
PLATELET # BLD AUTO: 330 K/UL (ref 150–450)
PMV BLD AUTO: 9.6 FL (ref 9.2–12.9)
POTASSIUM SERPL-SCNC: 3.9 MMOL/L (ref 3.5–5.1)
PROT SERPL-MCNC: 7.3 G/DL (ref 6–8.4)
RBC # BLD AUTO: 4.6 M/UL (ref 4.6–6.2)
SODIUM SERPL-SCNC: 139 MMOL/L (ref 136–145)
WBC # BLD AUTO: 8.5 K/UL (ref 3.9–12.7)

## 2025-02-18 PROCEDURE — 85025 COMPLETE CBC W/AUTO DIFF WBC: CPT | Mod: HCWC

## 2025-02-18 PROCEDURE — 36415 COLL VENOUS BLD VENIPUNCTURE: CPT | Mod: HCWC

## 2025-02-18 PROCEDURE — 80053 COMPREHEN METABOLIC PANEL: CPT | Mod: HCWC

## 2025-02-18 PROCEDURE — 82784 ASSAY IGA/IGD/IGG/IGM EACH: CPT | Mod: 59,HCWC

## 2025-02-18 PROCEDURE — 87340 HEPATITIS B SURFACE AG IA: CPT | Mod: HCWC

## 2025-02-18 PROCEDURE — 82306 VITAMIN D 25 HYDROXY: CPT | Mod: HCWC

## 2025-02-18 PROCEDURE — 86704 HEP B CORE ANTIBODY TOTAL: CPT | Mod: HCWC

## 2025-02-19 ENCOUNTER — RESULTS FOLLOW-UP (OUTPATIENT)
Dept: NEUROLOGY | Facility: CLINIC | Age: 65
End: 2025-02-19

## 2025-02-19 LAB — 25(OH)D3+25(OH)D2 SERPL-MCNC: 85 NG/ML (ref 30–96)

## 2025-02-20 ENCOUNTER — TELEPHONE (OUTPATIENT)
Dept: NEUROLOGY | Facility: CLINIC | Age: 65
End: 2025-02-20
Payer: MEDICARE

## 2025-02-21 RX ORDER — EPINEPHRINE 0.3 MG/.3ML
0.3 INJECTION SUBCUTANEOUS
OUTPATIENT
Start: 2025-02-21

## 2025-02-21 RX ORDER — ACETAMINOPHEN 500 MG
1000 TABLET ORAL
OUTPATIENT
Start: 2025-02-21

## 2025-02-21 RX ORDER — FAMOTIDINE 10 MG/ML
20 INJECTION INTRAVENOUS
OUTPATIENT
Start: 2025-02-21

## 2025-02-21 RX ORDER — HEPARIN 100 UNIT/ML
500 SYRINGE INTRAVENOUS
OUTPATIENT
Start: 2025-02-21

## 2025-02-21 RX ORDER — SODIUM CHLORIDE 0.9 % (FLUSH) 0.9 %
10 SYRINGE (ML) INJECTION
OUTPATIENT
Start: 2025-02-21

## 2025-02-21 RX ORDER — DIPHENHYDRAMINE HYDROCHLORIDE 50 MG/ML
50 INJECTION INTRAMUSCULAR; INTRAVENOUS
OUTPATIENT
Start: 2025-02-21

## 2025-03-07 ENCOUNTER — PATIENT MESSAGE (OUTPATIENT)
Dept: PSYCHIATRY | Facility: CLINIC | Age: 65
End: 2025-03-07
Payer: MEDICARE

## 2025-03-07 ENCOUNTER — INFUSION (OUTPATIENT)
Dept: INFUSION THERAPY | Facility: HOSPITAL | Age: 65
End: 2025-03-07
Payer: MEDICARE

## 2025-03-07 VITALS
SYSTOLIC BLOOD PRESSURE: 118 MMHG | HEIGHT: 64 IN | TEMPERATURE: 99 F | WEIGHT: 163.38 LBS | BODY MASS INDEX: 27.89 KG/M2 | HEART RATE: 77 BPM | RESPIRATION RATE: 18 BRPM | DIASTOLIC BLOOD PRESSURE: 71 MMHG

## 2025-03-07 DIAGNOSIS — G35 MULTIPLE SCLEROSIS: Primary | ICD-10-CM

## 2025-03-07 PROCEDURE — 63600175 PHARM REV CODE 636 W HCPCS: Mod: HCWC

## 2025-03-07 PROCEDURE — 96415 CHEMO IV INFUSION ADDL HR: CPT | Mod: HCWC

## 2025-03-07 PROCEDURE — A4216 STERILE WATER/SALINE, 10 ML: HCPCS | Mod: HCWC

## 2025-03-07 PROCEDURE — 96413 CHEMO IV INFUSION 1 HR: CPT | Mod: HCWC

## 2025-03-07 PROCEDURE — 96375 TX/PRO/DX INJ NEW DRUG ADDON: CPT | Mod: HCWC

## 2025-03-07 PROCEDURE — 25000003 PHARM REV CODE 250: Mod: HCWC

## 2025-03-07 PROCEDURE — 96367 TX/PROPH/DG ADDL SEQ IV INF: CPT | Mod: HCWC

## 2025-03-07 RX ORDER — ACETAMINOPHEN 500 MG
1000 TABLET ORAL
OUTPATIENT
Start: 2025-07-04

## 2025-03-07 RX ORDER — HEPARIN 100 UNIT/ML
500 SYRINGE INTRAVENOUS
Status: DISCONTINUED | OUTPATIENT
Start: 2025-03-07 | End: 2025-03-07 | Stop reason: HOSPADM

## 2025-03-07 RX ORDER — FAMOTIDINE 10 MG/ML
20 INJECTION INTRAVENOUS
OUTPATIENT
Start: 2025-07-04

## 2025-03-07 RX ORDER — DIPHENHYDRAMINE HYDROCHLORIDE 50 MG/ML
50 INJECTION INTRAMUSCULAR; INTRAVENOUS
OUTPATIENT
Start: 2025-07-04

## 2025-03-07 RX ORDER — SODIUM CHLORIDE 0.9 % (FLUSH) 0.9 %
10 SYRINGE (ML) INJECTION
OUTPATIENT
Start: 2025-07-04

## 2025-03-07 RX ORDER — FAMOTIDINE 10 MG/ML
20 INJECTION INTRAVENOUS
Status: COMPLETED | OUTPATIENT
Start: 2025-03-07 | End: 2025-03-07

## 2025-03-07 RX ORDER — DIPHENHYDRAMINE HYDROCHLORIDE 50 MG/ML
50 INJECTION INTRAMUSCULAR; INTRAVENOUS
Status: DISCONTINUED | OUTPATIENT
Start: 2025-03-07 | End: 2025-03-07 | Stop reason: HOSPADM

## 2025-03-07 RX ORDER — EPINEPHRINE 0.3 MG/.3ML
0.3 INJECTION SUBCUTANEOUS
OUTPATIENT
Start: 2025-07-04

## 2025-03-07 RX ORDER — HEPARIN 100 UNIT/ML
500 SYRINGE INTRAVENOUS
OUTPATIENT
Start: 2025-07-04

## 2025-03-07 RX ORDER — SODIUM CHLORIDE 0.9 % (FLUSH) 0.9 %
10 SYRINGE (ML) INJECTION
Status: DISCONTINUED | OUTPATIENT
Start: 2025-03-07 | End: 2025-03-07 | Stop reason: HOSPADM

## 2025-03-07 RX ORDER — EPINEPHRINE 0.3 MG/.3ML
0.3 INJECTION SUBCUTANEOUS
Status: DISCONTINUED | OUTPATIENT
Start: 2025-03-07 | End: 2025-03-07 | Stop reason: HOSPADM

## 2025-03-07 RX ORDER — ACETAMINOPHEN 500 MG
1000 TABLET ORAL
Status: COMPLETED | OUTPATIENT
Start: 2025-03-07 | End: 2025-03-07

## 2025-03-07 RX ADMIN — OCRELIZUMAB 600 MG: 300 INJECTION INTRAVENOUS at 10:03

## 2025-03-07 RX ADMIN — DIPHENHYDRAMINE HYDROCHLORIDE 50 MG: 50 INJECTION INTRAMUSCULAR; INTRAVENOUS at 09:03

## 2025-03-07 RX ADMIN — SODIUM CHLORIDE, PRESERVATIVE FREE 10 ML: 5 INJECTION INTRAVENOUS at 02:03

## 2025-03-07 RX ADMIN — DEXTROSE MONOHYDRATE 100 MG: 50 INJECTION, SOLUTION INTRAVENOUS at 09:03

## 2025-03-07 RX ADMIN — FAMOTIDINE 20 MG: 10 INJECTION INTRAVENOUS at 09:03

## 2025-03-07 RX ADMIN — ACETAMINOPHEN 1000 MG: 500 TABLET ORAL at 09:03

## 2025-03-07 NOTE — PLAN OF CARE
1405-Pt tolerated Ocrevus infusion well. No complaints or complications reported. Vital Signs stable. PIV removed, catheter intact. Pt aware to call provider with any questions or  concerns, aware of upcoming appointments, transported from clinic in wheelchair by family, no distress noted.

## 2025-03-07 NOTE — PROGRESS NOTES
[facility]  History & Physical  Orthopedics    SUBJECTIVE:      Chief Complaint:  Right hand pain    Referring Provider: No ref. provider found     Dr. Ho is the supervising physician for this encounter/patient    History of Present Illness:  Patient is a 64 y.o. left-hand dominant male presents today with complaints of right hand pain since sustaining a slip and fall out of his tub on January 31, 2025.  He initially presented to [facility] urgent care where x-rays of the right hand revealed a closed nondisplaced fracture of the 3rd metacarpal shaft and a closed nondisplaced fracture of the 4th metacarpal shaft.  He was placed into a right ulnar gutter plaster splint including the 3rd digit. He presents today with a 0/10 pain.  He notes he has been treating his pain and swelling with Advil as needed. He denies numbness, burning, and tingling.  He further denies previous upper extremity surgical history, but does report he suffers from multiple sclerosis.  He presents today for initial evaluation with no further complaints.    Interval history February 14, 2025: The patient returns for re-evaluation.  He has remained immobilized in a ulnar gutter plaster splint for the past week.  He presents today with minimal pain and he denies new falls/trauma.  He reports he is in a 0/10 pain, and he reports he is no longer taking anything for pain.  He further denies numbness and tingling.  He returns today for re-evaluation with no further complaints.    Interval history March 13, 2025: The patient returns for re-evaluation he has remained immobilized in an ulnar gutter fiberglass cast  for the past 3 weeks. The patient reports he has been noncompliant with his cast.  He has been removing his cast himself, about 2-3 times, at home to wash his hands. He presents today in minimal pain, but he does note he did sustain a fall earlier this month which resulted in a black eye to the right eye.  He denies new  pain, new numbness, and new tingling.  He is now approximately 6 weeks status post his initial injury.  He presents today for re-evaluation with no further complaints.    The patient is a/an retired.    Onset of symptoms/DOI was January 31, 2025.    Symptoms are aggravated by activity and movement    Symptoms are alleviated by rest and inactivity    Symptoms consist of pain, swelling, ecchymosis, and decreased ROM.    The patient rates their pain as a 0/10.    Attempted treatment(s) and/or interventions include activity modifications, rest, plaster splint and oral anti-inflammatories.     The patient denies any fevers, chills, N/V, D/C and presents for evaluation.       Past Medical History:   Diagnosis Date    MS (multiple sclerosis)      Past Surgical History:   Procedure Laterality Date    hydrocele       Review of patient's allergies indicates:  No Known Allergies  Social History     Social History Narrative    Not on file     Family History   Problem Relation Name Age of Onset    Obesity Mother           Current Outpatient Medications:     amitriptyline (ELAVIL) 25 MG tablet, Take 1 tablet (25 mg total) by mouth every evening., Disp: 90 tablet, Rfl: 1    b complex vitamins tablet, Take 1 tablet by mouth once daily., Disp: , Rfl:     cholecalciferol, vitamin D3, 125 mcg (5,000 unit) Tab, Take 5,000 Units by mouth once daily., Disp: , Rfl:     magnesium 250 mg Tab, Take 1 tablet by mouth once., Disp: , Rfl:     melatonin 1 mg Tab, Take 2 tablets by mouth once daily., Disp: , Rfl:     modafiniL (PROVIGIL) 100 MG Tab, Take 1 tablet (100 mg total) by mouth once daily., Disp: 90 tablet, Rfl: 1    Review of Systems:  Constitutional: no fever or chills  Eyes: no visual changes  ENT: no nasal congestion or sore throat  Respiratory: no cough or shortness of breath  Cardiovascular: no chest pain  Gastrointestinal: no nausea or vomiting, tolerating diet  Musculoskeletal: pain, soreness, decreased ROM, and  edema    OBJECTIVE:      Vital Signs (Most Recent):  There were no vitals filed for this visit.  There is no height or weight on file to calculate BMI.      Physical Exam:  Constitutional: The patient appears well-developed and well-nourished. No distress.   Skin: No lesions appreciated  Head: Normocephalic and atraumatic.   Nose: Nose normal.   Ears: No deformities seen  Eyes: Conjunctivae and EOM are normal.   Neck: No tracheal deviation present.   Cardiovascular: Normal rate and intact distal pulses.    Pulmonary/Chest: Effort normal. No respiratory distress.   Abdominal: There is no guarding.   Neurological: The patient is alert.   Psychiatric: The patient has a normal mood and affect.     Right Hand/Wrist Examination:    Observation/Inspection:  Swelling  moderate swelling to the dorsum of the right hand    Deformity  none  Discoloration  no notable ecchymosis overlying the metacarpal today  Scars   none    Atrophy  none    HAND/WRIST EXAMINATION:  Snuff box tenderness   Negative right   He is nontender to palpate the 3rd and 4th metacarpal shaft, head, and base    Neurovascular Exam:  Digits WWP, brisk CR < 3s throughout  NVI motor/LTS to M/R/U nerves, radial pulse 2+  Tinel's Test - Carpal Tunnel  Neg  Tinel's Test - Cubital Tunnel  Neg  Phalen's Test    Neg  Median Nerve Compression Test Neg    ROM hand restricted and limited - he notes significant soreness/tightness upon active finger flexion  He is able to actively flex the middle finger and ring finger approximately 4 cm from the palm of the hand  He is able to passively flex the middle finger and ring finger proximally 2 cm from the palm of the hand  He is able to actively extend the middle finger with ease; he is able to passively extend the middle finger with ease  He is unable to actively extend ring finger; he is able to passively extend the ring finger with ease    ROM wrist mildly restricted and limited without pain    ROM elbow full,  painless    Abdomen not guarded  Respirations nonlabored  Perfusion intact    Diagnostic Results:     Imaging - I independently viewed the patient's imaging as well as the radiology report.  Xrays of the patient's right hand reveal an healing oblique 3rd metacarpal shaft fracture in near anatomical alignment.  There is also a healing 4th metacarpal shaft fracture with mild angulation and mild metacarpal head shortening in near anatomical alignment.   No additional acute fractures or dislocations noted.  There is moderate soft tissue edema noted.  Notable DJD.    FINDINGS:  As on previous imaging there are fractures of the 3rd and 4th metacarpals, demonstrating no change in alignment compared to prior imaging and some interval healing.  There is no new fracture.  Osteoarthritic degenerative changes are noted once again.    EMG - None    ASSESSMENT/PLAN:      64 y.o. yo male with right 3rd metacarpal shaft fracture nondisplaced and right 4th metacarpal shaft fracture with acute angulation    Plan: The patient and I had a thorough discussion today.  We discussed the working diagnosis as well as several other potential alternative diagnoses.  Treatment options were discussed, both conservative and surgical.  Conservative treatment options would include things such as activity modifications, workplace modifications, a period of rest, oral vs topical OTC and prescription anti-inflammatory medications, occupational therapy, splinting/bracing, immobilization, corticosteroid injections, and others.  Surgical options were discussed as well.     At this time, we discussed the patient's updated x-rays of the right hand in detail.  He is now approximately 6 weeks out from initial injury and we will transition him to a right short-arm Titan wrist brace today. He is to continue wearing this daily for another 2 weeks.  However, he may remove his brace for hygiene.  He is continue taking oral anti-inflammatories as tolerated for  breakthrough pain.  OT orders placed today for gentle range of motion to the right hand.  He is to remain nonweightbearing to the right hand at this time.  He will follow up with our clinic in approximately 2-3 weeks with updated x-rays of the right hand out of brace or sooner for any problems.    Should the patient's symptoms worsen, persist, or fail to improve they should return for reevaluation and I would be happy to see them back anytime.    Please do not hesitate to reach out to us via email, phone, or MyChart with any questions, concerns, or feedback.       Dulce Burton PA-C

## 2025-03-13 ENCOUNTER — TELEPHONE (OUTPATIENT)
Dept: ORTHOPEDICS | Facility: CLINIC | Age: 65
End: 2025-03-13
Payer: MEDICARE

## 2025-03-13 ENCOUNTER — HOSPITAL ENCOUNTER (OUTPATIENT)
Dept: RADIOLOGY | Facility: HOSPITAL | Age: 65
Discharge: HOME | End: 2025-03-13
Payer: MEDICARE

## 2025-03-13 ENCOUNTER — OFFICE VISIT (OUTPATIENT)
Dept: ORTHOPEDICS | Facility: CLINIC | Age: 65
End: 2025-03-13
Payer: MEDICARE

## 2025-03-13 DIAGNOSIS — M79.641 RIGHT HAND PAIN: ICD-10-CM

## 2025-03-13 DIAGNOSIS — S62.352G: Primary | ICD-10-CM

## 2025-03-13 DIAGNOSIS — S62.324G: ICD-10-CM

## 2025-03-13 PROCEDURE — 1159F MED LIST DOCD IN RCRD: CPT | Mod: HCWC,CPTII,S$GLB,

## 2025-03-13 PROCEDURE — 1160F RVW MEDS BY RX/DR IN RCRD: CPT | Mod: HCWC,CPTII,S$GLB,

## 2025-03-13 PROCEDURE — 73130 X-RAY EXAM OF HAND: CPT | Mod: TC,HCWC,RT

## 2025-03-13 PROCEDURE — 99214 OFFICE O/P EST MOD 30 MIN: CPT | Mod: HCWC,S$GLB,,

## 2025-03-13 PROCEDURE — 99999 PR PBB SHADOW E&M-EST. PATIENT-LVL III: CPT | Mod: PBBFAC,HCWC,,

## 2025-03-13 PROCEDURE — 73130 X-RAY EXAM OF HAND: CPT | Mod: 26,HCWC,RT, | Performed by: INTERNAL MEDICINE

## 2025-04-01 ENCOUNTER — CLINICAL SUPPORT (OUTPATIENT)
Dept: REHABILITATION | Facility: HOSPITAL | Age: 65
End: 2025-04-01
Payer: MEDICARE

## 2025-04-01 DIAGNOSIS — S62.324G: ICD-10-CM

## 2025-04-01 DIAGNOSIS — M79.641 RIGHT HAND PAIN: ICD-10-CM

## 2025-04-01 DIAGNOSIS — S62.352G: ICD-10-CM

## 2025-04-01 DIAGNOSIS — M25.641 JOINT STIFFNESS OF HAND, RIGHT: Primary | ICD-10-CM

## 2025-04-01 PROCEDURE — 97165 OT EVAL LOW COMPLEX 30 MIN: CPT | Mod: HCWC

## 2025-04-01 PROCEDURE — 97110 THERAPEUTIC EXERCISES: CPT | Mod: HCWC

## 2025-04-01 NOTE — PATIENT INSTRUCTIONS
[facility], OCCUPATIONAL THERAPY  HOME EXERCISE PROGRAM                   Complete 10 repetitions of each exercise 2 times a day:                                                 Copyright © [facility]. All rights reserved.     Therapist: SATISH Peña/L, MARYJOT

## 2025-04-01 NOTE — PROGRESS NOTES
Outpatient Rehab    Occupational Therapy Evaluation and Discharge Note    Patient Name: Hernandez Hernandez  MRN: [id number]  YOB: 1960  Encounter Date: 4/1/2025    Therapy Diagnosis:   Encounter Diagnoses   Name Primary?    Closed nondisplaced fracture of shaft of third metacarpal bone of right hand with delayed healing, subsequent encounter     Closed displaced fracture of shaft of fourth metacarpal bone of right hand with delayed healing, subsequent encounter     Right hand pain     Joint stiffness of hand, right Yes     Physician: Dulce Burton PA-C    Physician Orders: Eval and Treat  Medical Diagnosis: Closed nondisplaced fracture of shaft of third metacarpal bone of right hand with delayed healing, subsequent encounter  Closed displaced fracture of shaft of fourth metacarpal bone of right hand with delayed healing, subsequent encounter  Right hand pain    Visit # / Visits Authorized: 1 / 1  Insurance Authorization Period: 3/13/2025 to 3/13/2026  Date of Evaluation: 4/1/2025  Plan of Care Certification: 4/1/2025 to 4/1/2025     Time In: 1400   Time Out: 1440  Total Time: 40   Total Billable Time: 40    Intake Outcome Measure for FOTO Survey    Therapist reviewed FOTO scores for Hernandez Hernandez on 4/1/2025.   FOTO report - see Media section or FOTO account episode details.     Intake Score: 58%    Precautions  Right Upper Extremity Weight-Bearing Status: Non-weight-bearing         Subjective   History of Present Illness  Hernandez is a 64 y.o. male who reports to occupational therapy with a chief concern of Right hand stiffness.     The patient reports a medical diagnosis of S62.352G (ICD-10-CM) - Nondisplaced fracture of shaft of third metacarpal bone, right hand, subsequent encounter for fracture with delayed healing  S62.324G (ICD-10-CM) - Displaced fracture of shaft of fourth metacarpal bone, right hand, subsequent encounter for fracture with delayed healing  M79.641 (ICD-10-CM) - Pain in right  hand.  Patient reports a surgery of NA.  Diagnostic tests related to this condition: X-ray.        Dominant Hand: Right  History of Present Condition/Illness: Sustained fracture to right 3rd and 4th metacarpals when he fell in the tub on 1/31/2025. Stated that he reduced the dislocated fingers. Went to Urgent Care on 2/3/2025 . He was splinted and referred to hand specialist. Seen by ROSE Burton on 2/5/2025 and again on 3/13/2025. He has been now referred to OT. He has MS which  has affected the function of the the right hand.     Activities of Daily Living  Social history was obtained from Patient.                   Pain     Patient reports a current pain level of 0/10. Pain at best is reported as 0/10. Pain at worst is reported as 0/10.   Pain Qualities: Tightness         Treatment History  Treatments  Previously Received Treatments: No  Currently Receiving Treatments: No    Living Arrangements  Living Situation  Living Arrangements: Alone  Support Systems: Spouse/significant other        Employment  Employment Status: On disability   Due to MS.      Past Medical History/Physical Systems Review:   Hernandez Hernandez  has a past medical history of MS (multiple sclerosis).    Hernandez Hernandez  has a past surgical history that includes hydrocele.    Hernandez has a current medication list which includes the following prescription(s): amitriptyline, b complex vitamins, cholecalciferol (vitamin d3), magnesium, melatonin, and modafinil.    Review of patient's allergies indicates:  No Known Allergies     Objective   Wrist/Hand Observations   Mild spasticity observed when Hernandez attempts to extend the wrist and when he attempts to extend the digits. Edema (mild and localized over dorsal 3rd and 4th metacarpals). Mild atrophy visible right fa and hand. Hernandez attributes this to immobilization.       Upper Extremity Sensation            Hernandez denies sensory deficits.         Wrist Range of Motion     Right wrist arom, wnl with effort and  mild tone in wrist flexors.        Digital arom wnl with effort.                 Treatment:  Therapeutic Exercise  TE 1: AROM wrist flex/ext. 10 reps  TE 2: AROM: Fist 10 reps  TE 3: AROM Thumb: oppostion to each finger, pinky slide 10 reps  TE 4: Educated in HEP.    Time Entry(in minutes):  OT Evaluation (Low) Time Entry: 25  Therapeutic Exercise Time Entry: 15    Assessment & Plan   Assessment  Hernandez presents with a condition of Low complexity.   Presentation of Symptoms: Stable  Will Comorbidities Impact Care: Yes                         Occupational profile: Hernandez is disabled due to MS. .   Evaluation/Treatment Response: Patient responded to treatment well  Prognosis: Good  Assessment Details: Hernandez displays arom = to that prior to recent hand fractures. He displays full arom wrist and hand with effort to combat pre-injury spasticity. His only complaint is feeling of tightness. This should resolve with HEP of AROM and once wrist brace is discharged by PASUSANNE.     Plan  From an occupational therapy perspective, the patient would benefit from: Skilled Rehab Services    Planned therapy interventions include: Other (Comment). HEP.           Visit Frequency: 1 times In Total for 1 Weeks.       This plan was discussed with Patient.   Discussion participants: Agreed Upon Plan of Care  Plan details: Discharge with HEP.           Patient's spiritual, cultural, and educational needs considered and patient agreeable to plan of care and goals.           Goals: (MET TODAY)  Active       OT Goal       Hernandez will be educated in and independent in HEP to resolve feeling the soft tissue tightness. Met today.           Yesy Baker, OT

## 2025-04-03 ENCOUNTER — TELEPHONE (OUTPATIENT)
Dept: ORTHOPEDICS | Facility: CLINIC | Age: 65
End: 2025-04-03
Payer: MEDICARE

## 2025-04-03 NOTE — TELEPHONE ENCOUNTER
"Called pt to r/s, no answer, left vm.    ----- Message from Corby sent at 4/3/2025  1:58 PM CDT -----  Cancel Existing AppointmentAppt Date:  4/3Type of appt: Est - [facility]; Right hand fractures involving the shafts of the 3rd and 4th metatarsalsPhysician: Maikol for cancellation?  Weather; Stating he'll call back another time to r/sCaller: SelfContact Preference: [phone] (home) Additional Information:"Thank you for all that you do for our patients"  "

## 2025-05-02 ENCOUNTER — HOSPITAL ENCOUNTER (OUTPATIENT)
Dept: RADIOLOGY | Facility: HOSPITAL | Age: 65
Discharge: HOME | End: 2025-05-02
Payer: MEDICARE

## 2025-05-02 DIAGNOSIS — G35 MULTIPLE SCLEROSIS: Chronic | ICD-10-CM

## 2025-05-02 PROCEDURE — 72141 MRI NECK SPINE W/O DYE: CPT | Mod: TC,HCWC

## 2025-05-02 PROCEDURE — 72146 MRI CHEST SPINE W/O DYE: CPT | Mod: 26,HCWC,, | Performed by: RADIOLOGY

## 2025-05-02 PROCEDURE — 72141 MRI NECK SPINE W/O DYE: CPT | Mod: 26,HCWC,, | Performed by: RADIOLOGY

## 2025-05-02 PROCEDURE — 70551 MRI BRAIN STEM W/O DYE: CPT | Mod: 26,HCWC,, | Performed by: RADIOLOGY

## 2025-05-02 PROCEDURE — 70551 MRI BRAIN STEM W/O DYE: CPT | Mod: TC,HCWC

## 2025-05-02 PROCEDURE — 72146 MRI CHEST SPINE W/O DYE: CPT | Mod: TC,HCWC

## 2025-05-06 ENCOUNTER — PATIENT MESSAGE (OUTPATIENT)
Dept: NEUROLOGY | Facility: CLINIC | Age: 65
End: 2025-05-06
Payer: MEDICARE

## 2025-05-07 NOTE — PROGRESS NOTES
[facility]  Follow Up Patient Visit      Disease Summary     Principle neurological diagnosis: MS    Date of symptom onset: 1998  Date of diagnosis: 1998  Disease type at diagnosis: Progressive  Disease type currently: Progressive, without relapses, but active progression  Previous therapy: Avonex (s/e's) 1999 - 2006  Current therapy: NA  Last MRI Brain: 11/8/2019  Last MRI C-spine: 11/82019  Last MRI T-spine: NA  CSF: NA  JCV status: NA  Other relevant labs and tests: Vitamin D 2021 57    Interval history:     He was diagnosed with MS after inability to walk/run in 1998. Symptoms persisted since then, with slow accumulation of other symptoms as detailed below. He was started on Avonex in 1999 but had significant side effects. He takes Elavil for sleep. He eventually stopped Avonex in 2006 and has not been on any DMTs since and symptoms have been mostly stable.     He used to be on provigil for fatigue. He used to be on baclofen as well. He is no longer on these two medications     Last seen in clinic by Dr. Foreman 9/20/20    Current symptom:  He has significant heat sensitivity and has worsening weakness usually during summer.   He has existing AB but denies diplopia.  Leg weakness (R worse than L), able to walk stairs holding onto rails  Right hand/hand weakness and tremor  Fatigue    He is on vitamin D 5000 IU daily.     Denies sensory changes, dysphagia, dysarthria, spasticity, visual changes, cognitive changes, mood disorder, pain, bladder and bowel dysfunction    ROS:     SOCIAL HISTORY  Social History     Tobacco Use    Smoking status: Never    Smokeless tobacco: Never   Substance Use Topics    Alcohol use: Yes     Alcohol/week: 3.0 standard drinks     Types: 2 Glasses of wine, 1 Cans of beer per week     Comment: occassionally    Drug use: No     Living arrangements - the patient lives alone    Employment - retired, on SSDI    Exam:     Vitals:    10/12/22 0803   BP: 105/66   Pulse:  "69   Weight: 76.5 kg (168 lb 8.7 oz)   Height: 5' 6" (1.676 m)          In general, the patient is well nourished and appears to be in no acute distress.          MENTAL STATUS: language is fluent, normal verbal comprehension, attention is normal, fund of knowlege is appropriate by vocabulary.     CRANIAL NERVE EXAM:  L AB. No facial asymmetry. No facial sensation differences L vs R. Tongue protrudes midline. There is no dysarthria. Shoulder shrug intact bilaterlly. Neck is supple with full ROM    MOTOR EXAM: Normal bulk and tone throughout UE and LE bilaterally. Strength is  5/5 in all groups in the lower extremities and upper extremities except R  hip flexor weakness 4/5, R dorsiflexor 4/5.     SENSORY EXAM: Normal to light touch throughout.    COORDINATION: R dysmetria more prominent on FTS, postural and action rubral tremor in hands, not on rest. L mild endpoint dysmetria on FTN, R dysmetria on HTS.     GAIT: Wide based, R circumduction, R foot drop, imbalanced    Timed 25 Foot Walk: 10/12/2022   Did patient wear an AFO? No   Was assistive device used? No   Time for 25 Foot Walk (seconds) 6.61   Time for 25 Foot Walk (seconds) 6.38         Imaging (personally reviewed):     MRI brain 11/8/2019:     Redemonstration of multiple demyelinating plaques, relatively unchanged in distribution and extent when allowing for differences in imaging technique/slice selection compared to most recent examination of 03/31/2017.  No evidence to suggest active demyelinating disease.     MRI cervical Spine 11/8/2019:     Redemonstration of multiple foci of T2/FLAIR signal hyperintensity in the cervical spinal cord in keeping with sequela of prior demyelination. These findings are similar in distribution and extent to most recent examination allowing for differences in imaging technique/slice selection. No evidence to suggest acute/active demyelinating process.        Labs:     Lab Results   Component Value Date    [id number] 52 " 12/07/2021     No results found for: JCVINDEX, JCVANTIBODY  No results found for: NT7DHGOS, ABSOLUTECD3, QV9TWFZU, ABSOLUTECD8, TJ9CZOGQ, ABSOLUTECD4, LABCD48  Lab Results   Component Value Date    WBC 7.58 12/07/2021    RBC 5.18 12/07/2021    HGB 15.7 12/07/2021    HCT 47.0 12/07/2021    MCV 91 12/07/2021    MCH 30.3 12/07/2021    MCHC 33.4 12/07/2021    RDW 12.1 12/07/2021     12/07/2021    MPV 8.8 (L) 12/07/2021    GRAN 4.2 12/07/2021    GRAN 55.8 12/07/2021    LYMPH 2.4 12/07/2021    LYMPH 31.1 12/07/2021    MONO 0.7 12/07/2021    MONO 9.2 12/07/2021    EOS 0.2 12/07/2021    BASO 0.06 12/07/2021    EOSINOPHIL 2.4 12/07/2021    BASOPHIL 0.8 12/07/2021     Sodium   Date Value Ref Range Status   12/07/2021 140 136 - 145 mmol/L Final     Potassium   Date Value Ref Range Status   12/07/2021 4.1 3.5 - 5.1 mmol/L Final     Chloride   Date Value Ref Range Status   12/07/2021 104 95 - 110 mmol/L Final     CO2   Date Value Ref Range Status   12/07/2021 28 23 - 29 mmol/L Final     Glucose   Date Value Ref Range Status   12/07/2021 108 70 - 110 mg/dL Final     BUN   Date Value Ref Range Status   12/07/2021 9 6 - 20 mg/dL Final     Creatinine   Date Value Ref Range Status   12/07/2021 0.9 0.5 - 1.4 mg/dL Final     Calcium   Date Value Ref Range Status   12/07/2021 9.6 8.7 - 10.5 mg/dL Final     Total Protein   Date Value Ref Range Status   12/07/2021 7.6 6.0 - 8.4 g/dL Final     Albumin   Date Value Ref Range Status   12/07/2021 4.3 3.5 - 5.2 g/dL Final     Total Bilirubin   Date Value Ref Range Status   12/07/2021 0.6 0.1 - 1.0 mg/dL Final     Comment:     For infants and newborns, interpretation of results should be based  on gestational age, weight and in agreement with clinical  observations.    Premature Infant recommended reference ranges:  Up to 24 hours.............<8.0 mg/dL  Up to 48 hours............<12.0 mg/dL  3-5 days..................<15.0 mg/dL  6-29 days.................<15.0 mg/dL       Alkaline  Phosphatase   Date Value Ref Range Status   12/07/2021 69 55 - 135 U/L Final     AST   Date Value Ref Range Status   12/07/2021 24 10 - 40 U/L Final     ALT   Date Value Ref Range Status   12/07/2021 42 10 - 44 U/L Final     Anion Gap   Date Value Ref Range Status   12/07/2021 8 8 - 16 mmol/L Final     eGFR if    Date Value Ref Range Status   12/07/2021 >60 >60 mL/min/1.73 m^2 Final     eGFR if non    Date Value Ref Range Status   12/07/2021 >60 >60 mL/min/1.73 m^2 Final     Comment:     Calculation used to obtain the estimated glomerular filtration  rate (eGFR) is the CKD-EPI equation.                   Diagnosis/Assessment/Plan:     Multiple Sclerosis  -Assessment:PPMS with symptom onset in 1998, active progression but in the past few years relatively stable, with heat-related exacerbations in the summer. Moderate brain and spinal cord lesion burden on MRI from 2019. Moderate disability on exam.   -Imaging: Updated MRI now  -Disease Modifying Therapies: May be reasonable to try Ocrevus but did explain to him that Ocrevus had more pronounced effects on disability progression in those patients who are younger and who had active inflammatory disease. Will check screening labs today  Symptom management   -Fatigue: resume provigil    -PT/OT   -Neuro-Optho follow up   -Patient is interested in clinical trials, will check with research team regarding eligibility  Lifestyle modifications for brain health discussed.  Return to clinic in 4-6 weeks.                 Total time spent with the patient: 45 minutes, including face to face consultation, chart review and coordination of care, on the day of the visit. This includes face to face time and non-face to face time preparing to see the patient (eg, review of tests), obtaining and/or reviewing separately obtained history, documenting clinical information in the electronic or other health record, independently interpreting resultsand  communicating results to the patient/family/caregiver, or care coordination.         Danica Anne MD, MSc  Attending neurologist              No Yes

## 2025-05-13 ENCOUNTER — PATIENT MESSAGE (OUTPATIENT)
Dept: PSYCHIATRY | Facility: CLINIC | Age: 65
End: 2025-05-13
Payer: MEDICARE

## 2025-06-19 ENCOUNTER — PATIENT MESSAGE (OUTPATIENT)
Dept: PSYCHIATRY | Facility: CLINIC | Age: 65
End: 2025-06-19
Payer: MEDICARE

## 2025-06-28 DIAGNOSIS — Z86.69 HX OF MIGRAINE HEADACHES: ICD-10-CM

## 2025-06-30 RX ORDER — AMITRIPTYLINE HYDROCHLORIDE 25 MG/1
25 TABLET, FILM COATED ORAL NIGHTLY
Qty: 90 TABLET | Refills: 3 | Status: SHIPPED | OUTPATIENT
Start: 2025-06-30

## 2025-07-18 ENCOUNTER — LAB VISIT (OUTPATIENT)
Dept: LAB | Facility: HOSPITAL | Age: 65
End: 2025-07-18
Attending: STUDENT IN AN ORGANIZED HEALTH CARE EDUCATION/TRAINING PROGRAM
Payer: MEDICARE

## 2025-07-18 ENCOUNTER — OFFICE VISIT (OUTPATIENT)
Dept: NEUROLOGY | Facility: CLINIC | Age: 65
End: 2025-07-18
Payer: MEDICARE

## 2025-07-18 VITALS
HEART RATE: 70 BPM | BODY MASS INDEX: 28.32 KG/M2 | DIASTOLIC BLOOD PRESSURE: 79 MMHG | SYSTOLIC BLOOD PRESSURE: 121 MMHG | WEIGHT: 165.88 LBS | HEIGHT: 64 IN

## 2025-07-18 DIAGNOSIS — Z11.59 ENCOUNTER FOR SCREENING FOR OTHER VIRAL DISEASES: ICD-10-CM

## 2025-07-18 DIAGNOSIS — G35 MULTIPLE SCLEROSIS: ICD-10-CM

## 2025-07-18 DIAGNOSIS — G35 MULTIPLE SCLEROSIS: Primary | Chronic | ICD-10-CM

## 2025-07-18 LAB
ABSOLUTE EOSINOPHIL (OHS): 0.18 K/UL
ABSOLUTE MONOCYTE (OHS): 0.69 K/UL (ref 0.3–1)
ABSOLUTE NEUTROPHIL COUNT (OHS): 4.64 K/UL (ref 1.8–7.7)
ALBUMIN SERPL BCP-MCNC: 4.5 G/DL (ref 3.5–5.2)
ALP SERPL-CCNC: 68 UNIT/L (ref 40–150)
ALT SERPL W/O P-5'-P-CCNC: 25 UNIT/L (ref 10–44)
ANION GAP (OHS): 11 MMOL/L (ref 8–16)
AST SERPL-CCNC: 21 UNIT/L (ref 11–45)
BASOPHILS # BLD AUTO: 0.07 K/UL
BASOPHILS NFR BLD AUTO: 1 %
BILIRUB SERPL-MCNC: 0.6 MG/DL (ref 0.1–1)
BUN SERPL-MCNC: 9 MG/DL (ref 8–23)
CALCIUM SERPL-MCNC: 9.5 MG/DL (ref 8.7–10.5)
CHLORIDE SERPL-SCNC: 104 MMOL/L (ref 95–110)
CO2 SERPL-SCNC: 25 MMOL/L (ref 23–29)
CREAT SERPL-MCNC: 0.8 MG/DL (ref 0.5–1.4)
ERYTHROCYTE [DISTWIDTH] IN BLOOD BY AUTOMATED COUNT: 12.2 % (ref 11.5–14.5)
GFR SERPLBLD CREATININE-BSD FMLA CKD-EPI: >60 ML/MIN/1.73/M2
GLUCOSE SERPL-MCNC: 89 MG/DL (ref 70–110)
HBV CORE AB SERPL QL IA: NORMAL
HBV SURFACE AG SERPL QL IA: NORMAL
HCT VFR BLD AUTO: 45.3 % (ref 40–54)
HGB BLD-MCNC: 14.9 GM/DL (ref 14–18)
IGA SERPL-MCNC: 307 MG/DL (ref 40–350)
IGG SERPL-MCNC: 1279 MG/DL (ref 650–1600)
IGM SERPL-MCNC: 25 MG/DL (ref 50–300)
IMM GRANULOCYTES # BLD AUTO: 0.03 K/UL (ref 0–0.04)
IMM GRANULOCYTES NFR BLD AUTO: 0.4 % (ref 0–0.5)
LYMPHOCYTES # BLD AUTO: 1.67 K/UL (ref 1–4.8)
MCH RBC QN AUTO: 30 PG (ref 27–31)
MCHC RBC AUTO-ENTMCNC: 32.9 G/DL (ref 32–36)
MCV RBC AUTO: 91 FL (ref 82–98)
NUCLEATED RBC (/100WBC) (OHS): 0 /100 WBC
PLATELET # BLD AUTO: 271 K/UL (ref 150–450)
PMV BLD AUTO: 9 FL (ref 9.2–12.9)
POTASSIUM SERPL-SCNC: 4.1 MMOL/L (ref 3.5–5.1)
PROT SERPL-MCNC: 7.6 GM/DL (ref 6–8.4)
RBC # BLD AUTO: 4.96 M/UL (ref 4.6–6.2)
RELATIVE EOSINOPHIL (OHS): 2.5 %
RELATIVE LYMPHOCYTE (OHS): 22.9 % (ref 18–48)
RELATIVE MONOCYTE (OHS): 9.5 % (ref 4–15)
RELATIVE NEUTROPHIL (OHS): 63.7 % (ref 38–73)
SODIUM SERPL-SCNC: 140 MMOL/L (ref 136–145)
WBC # BLD AUTO: 7.28 K/UL (ref 3.9–12.7)

## 2025-07-18 PROCEDURE — 85025 COMPLETE CBC W/AUTO DIFF WBC: CPT

## 2025-07-18 PROCEDURE — 82784 ASSAY IGA/IGD/IGG/IGM EACH: CPT | Mod: 59

## 2025-07-18 PROCEDURE — 86704 HEP B CORE ANTIBODY TOTAL: CPT

## 2025-07-18 PROCEDURE — 83884 ASSAY NEURFLMNT LIGHT CHAIN: CPT

## 2025-07-18 PROCEDURE — 87340 HEPATITIS B SURFACE AG IA: CPT

## 2025-07-18 PROCEDURE — 36415 COLL VENOUS BLD VENIPUNCTURE: CPT

## 2025-07-18 PROCEDURE — 99999 PR PBB SHADOW E&M-EST. PATIENT-LVL III: CPT | Mod: PBBFAC,,, | Performed by: STUDENT IN AN ORGANIZED HEALTH CARE EDUCATION/TRAINING PROGRAM

## 2025-07-18 PROCEDURE — 82040 ASSAY OF SERUM ALBUMIN: CPT

## 2025-07-18 PROCEDURE — 83520 IMMUNOASSAY QUANT NOS NONAB: CPT

## 2025-07-18 NOTE — Clinical Note
Any resources to help him get assistance with getting a disability-friendly shower stall? Or at least installing grab bars? He fell in his bathtub in April and broke his hand

## 2025-07-18 NOTE — PATIENT INSTRUCTIONS
[email or web address]

## 2025-07-18 NOTE — PROGRESS NOTES
[facility]  Follow Up Patient Visit      Disease Summary     NEURO MULTIPLE SCLEROISIS SUMMARY:   Principle neurological diagnosis:  MS  Date of Symptom Onset:  1998  Date of Diagnosis:  1998  Disease type at diagnosis:  Primary Progressive MS  Disease type currently:  Primary Progressive MS  Previous Therapy:  First DMT:  Interferon beta-1a IM - [id number] (s/e's)  Current Therapy:  Ocrelizumab - Ocrevus 5/2023 - present  Last MRI Brain:  5/2/2025 - stable  Last MRI C-Spine:  5/2/2025 - stable  Last MRI T-Spine:  5/2/2025 - stable  Labs   No CSF completed    Interval history:     He fell in his bathtub in Feb 16, fractured his hand. Did not require surgery. Last followed up with Ortho in March   Denies other symptoms. Tolerates Ocrevus well, no infection.   Has an AFO for ankle stability but feels like it makes him more unsteady when wearing it.     ROS:     SOCIAL HISTORY    Social History     Socioeconomic History    Marital status:     Number of children: 2   Occupational History    Occupation:     Occupation: [employer]    Occupation: currently unempl   Tobacco Use    Smoking status: Never    Smokeless tobacco: Never   Substance and Sexual Activity    Alcohol use: Yes     Alcohol/week: 3.0 standard drinks of alcohol     Types: 2 Glasses of wine, 1 Cans of beer per week     Comment: occassionally    Drug use: No     Social Drivers of Health     Financial Resource Strain: High Risk (2/12/2025)    Overall Financial Resource Strain (CARDIA)     Difficulty of Paying Living Expenses: Hard   Food Insecurity: No Food Insecurity (2/12/2025)    Hunger Vital Sign     Worried About Running Out of Food in the Last Year: Never true     Ran Out of Food in the Last Year: Never true   Transportation Needs: Unmet Transportation Needs (2/12/2025)    PRAPARE - Transportation     Lack of Transportation (Medical): Yes     Lack of Transportation (Non-Medical): No   Physical Activity: Inactive  (2/12/2025)    Exercise Vital Sign     Days of Exercise per Week: 0 days     Minutes of Exercise per Session: 0 min   Stress: No Stress Concern Present (2/12/2025)    [employer] [place] [employer] - Occupational Stress Questionnaire     Feeling of Stress : Not at all   Housing Stability: Low Risk  (2/12/2025)    Housing Stability Vital Sign     Unable to Pay for Housing in the Last Year: No     Homeless in the Last Year: No       Patient Employment       Status   Full Time    Employer   [employer] (OTHER)    Address   ,                    7/11/2025    10:07 PM   REVIEW OF SYMPTOMS   Do you feel abnormally tired on most days? No   Do you feel you generally sleep well? Yes   Do you have difficulty controlling your bladder?  No   Do you have difficulty controlling your bowels?  No   Do you have frequent muscle cramps, tightness or spasms in your limbs?  No   Do you have new visual symptoms?  No   Do you have worsening difficulty with your memory or thinking? No   Do you have worsening symptoms of anxiety or depression?  No   For patients who walk, Do you have more difficulty walking?  No   Have you fallen since your last visit?  Yes   For patients who use wheelchairs: Do you have any skin wounds or breakdown? Not Applicable   Do you have difficulty using your hands?  No   Do you have shooting or burning pain? No   Do you have difficulty with sexual function?  No   If you are sexually active, are you using birth control? Y/N  N/A Not Applicable   Do you often choke when swallowing liquids or solid food?  No   Do you experience worsening symptoms when overheated? Yes   Do you need any new equipment such as a wheelchair, walker or shower chair? No   Do you receive co-pay financial assistance for your principal MS medicine? No   Would you be interested in participating in an MS research trial in the future? Yes   For patients on Gilenya, Tecfidera, Aubagio, Rituxan, Ocrevus, Tysabri, Lemtrada or  Methotrexate, are you aware that you should NOT receive live virus vaccines?  No   Do you feel you have adequate family/friend support?  Yes   Do you have health insurance?   Yes   Are you currently employed? No   Do you receive SSDI/SSI?  Yes   Do you use marijuana or cannabis products? No   Have you been diagnosed with a urinary tract infection since your last visit here? No   Have you been diagnosed with a respiratory tract infection since your last visit here? No   Have you been to the emergency room since your last visit here? No   Have you been hospitalized since your last visit here?  No         7/11/2025    10:19 PM 1/10/2025    12:04 PM   FSS SCORE & INTERPRETATION   FSS SCORE  35  47    FSS SCORE INTERPRETATION May not be suffering from fatigue  May be suffering from fatigue        Patient-reported         7/11/2025    10:14 PM 1/10/2025    11:59 AM   MS BARI-D SCORE & INTERPRETATION   BARI-D SCORE  0  3    BARI-D INTERPRETATION  No indication of Depression  No indication of Depression        Patient-reported         7/11/2025    10:09 PM 1/10/2025    11:55 AM   MS EDA-7 SCORE & INTERPRETATION   EDA-7 SCORE  0  0    EDA-7 SCORE INTERPRETATION Normal  Normal        Patient-reported         7/11/2025    10:22 PM 1/10/2025    12:06 PM   PEQ MS MOS PAIN EFFECTS SCORE & INTERPRETATION   PES SCORE 7  8    PES SCORE INTERPRETATION Scores can range from 6-30.  Items are scaled so that higher scores indicate a greater impact of pain on a patients mood and behavior.  Scores can range from 6-30.  Items are scaled so that higher scores indicate a greater impact of pain on a patients mood and behavior.        Patient-reported         7/11/2025    10:26 PM 1/10/2025    12:08 PM   PEQ MS SEXUAL SATISFACTION SCORE & INTERPRETATION   SSS SCORE  5  4    SSS SCORE INTERPRETATION Scores can range from 4-24.  Higher scores indicate greater problems with sexual satisfaction.  Scores can range from 4-24.  Higher scores indicate  greater problems with sexual satisfaction.        Patient-reported         7/11/2025    10:28 PM 1/10/2025    12:10 PM   MS BLADDER CONTROL SCORE & INTERPRETATION   BLCS SCORE 1  0    BLCS SCORE INTERPRETATION  Scores can range from 0-22, with higher scores indicating greater bladder control problems.  Scores can range from 0-22, with higher scores indicating greater bladder control problems.        Patient-reported         7/11/2025    10:34 PM 1/10/2025    12:17 PM   MS BOWEL CONTROL SCORE & INTERPRETATION   BWCS SCORE 1  1    BWCS SCORE INTERPRETATION Scores can range from 0-26, with higher scores indicating greater bowel control problems.  Scores can range from 0-26, with higher scores indicating greater bowel control problems.        Patient-reported         7/11/2025    10:30 PM 1/10/2025    12:11 PM   PEQ MS IMPACT OF VISUAL IMPAIRMENT SCORE & INTERPRETATION   SANTOS SCALE SCORE  0  0    SANTOS SCORE INTERPRETATION Scores can range from 0-15, with higher scores indicating greater impact of visual problems on daily activites.  Scores can range from 0-15, with higher scores indicating greater impact of visual problems on daily activites.        Patient-reported         7/11/2025    10:33 PM 1/10/2025    12:15 PM   MS PDQ SCORE & INTERPRETATION   PDQ RETROSPECTIVE MEMORY SUBSCALE 3  2    PDQ ATTENTION/CONCENTRATION SUBSCALE 0  2    PDQ PROSPECTIVE MEMORY SUBSCALE 2  2    PDQ PLANNING/ORGANIZATION SUBSCALE 0  0    PDQ TOTAL SCORE 5  6    PDQ SCORE INTERPRETATION Scores can range from 0-80, with higher scores indicating greater perceived cognitive impairment.  Scores can range from 0-80, with higher scores indicating greater perceived cognitive impairment.        Patient-reported         7/11/2025     9:56 PM 11/19/2023    10:24 PM   MSSS SCORE & INTERPRETATION   MSSS TANGIBLE SUPPORT SUBSCALE 93.75  0   MSSS EMOTIONAL/INFORMATIONAL SUPPORT SUBSCALE 100  0   MSSS AFFECTIONATE SUPPORT SUBSCALE 91.67  0   MSSS POSITIVE  "SOCIAL INTERACTION SUBSCALE 91.67  0   MSSS TOTAL SCORE 94.27  0   MSSS SCORE INTERPRETATION Scores can range from 0-100, with higher scores indicating greater perceived support.  Scores can range from 0-100, with higher scores indicating greater perceived support.       Patient-reported       Exam:     Vitals:    07/18/25 1131   BP: 121/79   BP Location: Right arm   Patient Position: Sitting   Pulse: 70   Weight: 75.3 kg (165 lb 14.3 oz)   Height: 5' 4" (1.626 m)        In general, the patient is well nourished and appears to be in no acute distress.    MENTAL STATUS: language is fluent, normal verbal comprehension, short-term and remote memory is intact, attention is normal, patient is alert and oriented x 3, fund of knowlege is appropriate by vocabulary. Behavior and judgment appropriate.     CRANIAL NERVE EXAM:  Bilateral AB. No facial asymmetry. Facial sensation is intact bilaterally. There is no dysarthria. Uvula is midline, and palate moves symmetrically. Shoulder shrug intact decreased in L. Tongue protrusion is midline. Hearing is intact to finger rub bilaterally. Neck is supple with full ROM    MOTOR EXAM: Normal bulk and tone throughout UE and LE bilaterally.   No pronator drift; rapid sequential movements are normal;     Strength:  R deltoid 5/5, L deltoid 4/5  R biceps 5/5, L biceps 5/5  R triceps 5/5, L triceps 5/5  R finger flexors 5/5, L finger flexors 5/5  R finger extensors 4+/5, L finger extensors 5/5  R finger abductors 4+/5, L finger abductors 5/5  R  hip flexors 3-/5, L hip flexors 5/5  R  hip extensors 4/5, L hip extensors 5/5  R knee extensors 4/5, L knee extensors 5/5  R knee flexors 4/5, L knee flexors 5/5  R ankle dorsiflexors 5/5, L ankle dorsiflexors 5/5  R ankle plantarflexors 5/5, L ankle plantarflexors 5/5    SENSORY EXAM: Normal to light touch throughout.    COORDINATION: Dysmetria bilaterally, worse in R.     GAIT: Unsteady with R hemiparesis, and ataxic gait, a few near falls. "         11/20/2023     9:30 AM 7/15/2024    12:00 PM 1/17/2025     1:00 PM   Timed 25 Foot Walk:   Did patient wear an AFO? No No No   Was assistive device used? No No No   Time for 25 Foot Walk (seconds) 8.36 7.13 7.62   Time for 25 Foot Walk (seconds) 7.39 7.2 7.89       Imaging (personally reviewed):     Results for orders placed during the hospital encounter of 05/02/25    MRI Brain Demyelinating Without Contrast    Impression  Brain and spinal cord appear stable from prior exam, demonstrating findings compatible with reported history of demyelinating disease.  No new lesion.    Stable degenerative changes of the cervical spine.    Electronically signed by resident: Ace Art  Date:    05/02/2025  Time:    10:04    Electronically signed by: Demetrius Phan  Date:    05/02/2025  Time:    13:54    Results for orders placed during the hospital encounter of 05/02/25    MRI Cervical Spine Demyelinating Without Contrast    Impression  Brain and spinal cord appear stable from prior exam, demonstrating findings compatible with reported history of demyelinating disease.  No new lesion.    Stable degenerative changes of the cervical spine.    Electronically signed by resident: Ace Art  Date:    05/02/2025  Time:    10:04    Electronically signed by: Demetrius Phan  Date:    05/02/2025  Time:    13:54    Results for orders placed during the hospital encounter of 05/02/25    MRI Thoracic Spine Demyelinating Without Contrast    Impression  Brain and spinal cord appear stable from prior exam, demonstrating findings compatible with reported history of demyelinating disease.  No new lesion.    Stable degenerative changes of the cervical spine.    Electronically signed by resident: Ace Art  Date:    05/02/2025  Time:    10:04    Electronically signed by: Demetrius Phan  Date:    05/02/2025  Time:    13:54    Results for orders placed during the hospital encounter of 11/01/22    MRI Brain Demyelinating W W/O  "Contrast    Impression  Stable nonspecific white matter lesions consistent with the history of multiple sclerosis with no new or enhancing lesion identified.      Electronically signed by: Demetrius Phan  Date:    11/02/2022  Time:    08:25    Results for orders placed during the hospital encounter of 11/01/22    MRI Cervical Spine Demyelinating W W/O Contrast    Impression  Stable lesions within the cervical cord with no new or enhancing lesion.    In the thoracic cord there are lesions distally.  There is no prior study for comparison.  Minimal enhancement of one of the lesions is difficult to exclude but thought less likely with no evidence of definite cord expansion.      Electronically signed by: Demetrius Phan  Date:    11/02/2022  Time:    10:00    Results for orders placed during the hospital encounter of 11/01/22    MRI Thoracic Spine Demyelinating W W/O Contrast    Impression  FINDINGS/  Please see the report of this MR imaging study of the cervical spine as these studies were dictated together.      Electronically signed by: Demetrius Phan  Date:    11/02/2022  Time:    12:09      Labs (personally reviewed):     Lab Results   Component Value Date    MLDRYWVF41AH 85 02/18/2025    [id number] 52 12/07/2021     Lab Results   Component Value Date    JCVINDEX 1.34 (H) 10/12/2022    JCVANTIBODY POSITIVE (A) 10/12/2022     No results found for: "TV1XYUGB", "ABSOLUTECD3", "YB7APOCY", "ABSOLUTECD8", "BN3BFQTK", "ABSOLUTECD4", "LABCD48"  Lab Results   Component Value Date    WBC 8.50 02/18/2025    RBC 4.60 02/18/2025    HGB 14.2 02/18/2025    HCT 44.5 02/18/2025    MCV 97 02/18/2025    MCH 30.9 02/18/2025    MCHC 31.9 (L) 02/18/2025    RDW 12.6 02/18/2025     02/18/2025    MPV 9.6 02/18/2025    GRAN 5.7 02/18/2025    GRAN 66.9 02/18/2025    LYMPH 1.5 02/18/2025    LYMPH 17.2 (L) 02/18/2025    MONO 0.9 02/18/2025    MONO 10.8 02/18/2025    EOS 0.3 02/18/2025    BASO 0.08 02/18/2025    EOSINOPHIL 3.3 " 02/18/2025    BASOPHIL 0.9 02/18/2025     Sodium   Date Value Ref Range Status   02/18/2025 139 136 - 145 mmol/L Final     Potassium   Date Value Ref Range Status   02/18/2025 3.9 3.5 - 5.1 mmol/L Final     Chloride   Date Value Ref Range Status   02/18/2025 102 95 - 110 mmol/L Final     CO2   Date Value Ref Range Status   02/18/2025 24 23 - 29 mmol/L Final     Glucose   Date Value Ref Range Status   02/18/2025 106 70 - 110 mg/dL Final     BUN   Date Value Ref Range Status   02/18/2025 11 8 - 23 mg/dL Final     Creatinine   Date Value Ref Range Status   02/18/2025 0.8 0.5 - 1.4 mg/dL Final     Calcium   Date Value Ref Range Status   02/18/2025 9.5 8.7 - 10.5 mg/dL Final     Total Protein   Date Value Ref Range Status   02/18/2025 7.3 6.0 - 8.4 g/dL Final     Albumin   Date Value Ref Range Status   02/18/2025 3.9 3.5 - 5.2 g/dL Final     Total Bilirubin   Date Value Ref Range Status   02/18/2025 0.4 0.1 - 1.0 mg/dL Final     Comment:     For infants and newborns, interpretation of results should be based  on gestational age, weight and in agreement with clinical  observations.    Premature Infant recommended reference ranges:  Up to 24 hours.............<8.0 mg/dL  Up to 48 hours............<12.0 mg/dL  3-5 days..................<15.0 mg/dL  6-29 days.................<15.0 mg/dL       Alkaline Phosphatase   Date Value Ref Range Status   02/18/2025 116 40 - 150 U/L Final     AST   Date Value Ref Range Status   02/18/2025 40 10 - 40 U/L Final     ALT   Date Value Ref Range Status   02/18/2025 28 10 - 44 U/L Final     Anion Gap   Date Value Ref Range Status   02/18/2025 13 8 - 16 mmol/L Final     eGFR if    Date Value Ref Range Status   12/07/2021 >60 >60 mL/min/1.73 m^2 Final     eGFR if non    Date Value Ref Range Status   12/07/2021 >60 >60 mL/min/1.73 m^2 Final     Comment:     Calculation used to obtain the estimated glomerular filtration  rate (eGFR) is the CKD-EPI equation.     "    sNfL: No results found for: "NEUROLGTCHN"  GFAP: No results found for: "GFAPPLASMA"  CSF:No results found for: "CSFWBC", "CSFRBC", "PROTEINCSF", "GLUCCSF", "KAPPAFREE", "CSFB", "SERB", "IGGINDEXCSF"            Diagnosis/Assessment/Plan:       NEURO MULTIPLE SCLEROSIS IMPRESSION:   Number of relapses in the past year?:  0  Clinical Progression:  Worsened  Type:  Progressive  MRI Progression:  Stable  MS Classification:  Primary Progressive MS  Current DMT: ocrelizumab  DMT:  No change in management  [place] Relapse Management: Other  Symptom Management:  Implement change in symptom management  Implement Change in Symptom Management:  Adaptive Needs    Ordered Ocrevus safety labs today.   Walking is more unsteady today, with or without the AFO, observed ankle instability and ataxia that pose fall risks.  Referral to PT, discussed ankle brace as an alternative to AFO.  Discussed clemastine, if he wants to try, can send to [facility] for a trial.   Keeping him on the list for future progressive MS trials.        Total time spent with the patient: 52 minutes, including face to face consultation, chart review and coordination of care, on the day of the visit. This includes face to face time and non-face to face time preparing to see the patient (eg, review of tests), obtaining and/or reviewing separately obtained history, documenting clinical information in the electronic or other health record, independently interpreting results and communicating results to the patient/family/caregiver, or care coordination.   I performed a neurobehavioral status examination that included a clinical assessment of thinking, reasoning, and judgment. Please see above HPI and ROS for full details.       This note was generated with the assistance of ambient listening technology. Verbal consent was obtained by the patient and accompanying visitor(s) for the recording of patient appointment to facilitate this note. I attest to having " reviewed and edited the generated note for accuracy, though some syntax or spelling errors may persist. Please contact the author of this note for any clarification.        Danica Anne MD, MSc  Attending neurologist

## 2025-07-21 ENCOUNTER — TELEPHONE (OUTPATIENT)
Dept: PSYCHIATRY | Facility: CLINIC | Age: 65
End: 2025-07-21
Payer: MEDICARE

## 2025-07-21 LAB — NEUROFILAMENT LIGHT CHAIN, PLASMA: 12 PG/ML

## 2025-07-21 NOTE — TELEPHONE ENCOUNTER
I sent Pt a message through [employer]'d charts letting him know that his request had been received and we would respond in 7-10 business days.

## 2025-07-22 LAB — LC GLIAL FIBRILLARY ACID PROTEIN: 61.6 PG/ML (ref 0–186)

## 2025-07-25 ENCOUNTER — TELEPHONE (OUTPATIENT)
Dept: PSYCHIATRY | Facility: CLINIC | Age: 65
End: 2025-07-25
Payer: MEDICARE

## 2025-07-25 ENCOUNTER — PATIENT MESSAGE (OUTPATIENT)
Dept: PSYCHIATRY | Facility: CLINIC | Age: 65
End: 2025-07-25
Payer: MEDICARE

## 2025-07-25 NOTE — TELEPHONE ENCOUNTER
I contacted Pt to discuss his request for disability shower accommodations. There was no answer and I left a message.    Pt called back. We spoke about funding options for [employer] purchase and installation. I sent Pt a message through [employer] with links to funding sources. I will follow up with Pt on 7/30.

## 2025-07-29 ENCOUNTER — TELEPHONE (OUTPATIENT)
Dept: NEUROLOGY | Facility: CLINIC | Age: 65
End: 2025-07-29
Payer: MEDICARE

## 2025-07-29 NOTE — TELEPHONE ENCOUNTER
Ocrevus Zunovo  Infusion due / scheduled: 9/3/25  Authorization: pending with pre-service  Labs:  7/18/25  WBC 7.28  ALC 1667  AST 21, ALT 25  IgG 1279 IgM 25 IgA 307  HBsAg - anti-Hbc -, no current or past infection  Orders signed: pending auth

## 2025-07-29 NOTE — TELEPHONE ENCOUNTER
----- Message from Danica Anne MD sent at 7/18/2025 12:27 PM CDT -----  Can he also get switched to Z[employer]ovo? He's interested in trying. Due Sept.

## 2025-07-30 ENCOUNTER — PATIENT MESSAGE (OUTPATIENT)
Dept: PSYCHIATRY | Facility: CLINIC | Age: 65
End: 2025-07-30
Payer: MEDICARE

## 2025-08-01 ENCOUNTER — PATIENT MESSAGE (OUTPATIENT)
Dept: PSYCHIATRY | Facility: CLINIC | Age: 65
End: 2025-08-01
Payer: MEDICARE

## 2025-08-07 RX ORDER — CETIRIZINE HYDROCHLORIDE 10 MG/1
10 TABLET ORAL
Start: 2025-08-07 | End: 2025-08-07

## 2025-08-07 RX ORDER — ACETAMINOPHEN 500 MG
1000 TABLET ORAL
OUTPATIENT
Start: 2025-08-07

## 2025-08-07 RX ORDER — EPINEPHRINE 0.3 MG/.3ML
0.3 INJECTION SUBCUTANEOUS
OUTPATIENT
Start: 2025-08-07

## 2025-08-07 RX ORDER — DEXAMETHASONE 4 MG/1
20 TABLET ORAL
OUTPATIENT
Start: 2025-08-07

## 2025-08-07 RX ORDER — FAMOTIDINE 20 MG/1
20 TABLET, FILM COATED ORAL
OUTPATIENT
Start: 2025-08-07

## 2025-08-07 RX ORDER — DIPHENHYDRAMINE HYDROCHLORIDE 50 MG/ML
50 INJECTION, SOLUTION INTRAMUSCULAR; INTRAVENOUS
OUTPATIENT
Start: 2025-08-07